# Patient Record
Sex: MALE | Race: BLACK OR AFRICAN AMERICAN | Employment: UNEMPLOYED | URBAN - METROPOLITAN AREA
[De-identification: names, ages, dates, MRNs, and addresses within clinical notes are randomized per-mention and may not be internally consistent; named-entity substitution may affect disease eponyms.]

---

## 2018-04-09 DIAGNOSIS — N18.2 CHRONIC KIDNEY DISEASE, STAGE II (MILD): ICD-10-CM

## 2018-04-09 RX ORDER — SODIUM CHLORIDE 0.9 % (FLUSH) 0.9 %
5 SYRINGE (ML) INJECTION PRN
Status: CANCELLED | OUTPATIENT
Start: 2018-04-11

## 2018-04-09 RX ORDER — EPINEPHRINE 1 MG/ML
0.3 INJECTION, SOLUTION, CONCENTRATE INTRAVENOUS PRN
Status: CANCELLED | OUTPATIENT
Start: 2018-04-11

## 2018-04-09 RX ORDER — 0.9 % SODIUM CHLORIDE 0.9 %
10 VIAL (ML) INJECTION ONCE
Status: CANCELLED | OUTPATIENT
Start: 2018-04-11 | End: 2018-04-11

## 2018-04-09 RX ORDER — SODIUM CHLORIDE 9 MG/ML
INJECTION, SOLUTION INTRAVENOUS ONCE
Status: CANCELLED | OUTPATIENT
Start: 2018-04-11 | End: 2018-04-11

## 2018-04-09 RX ORDER — SODIUM CHLORIDE 9 MG/ML
INJECTION, SOLUTION INTRAVENOUS CONTINUOUS
Status: CANCELLED | OUTPATIENT
Start: 2018-04-11

## 2018-04-09 RX ORDER — HEPARIN SODIUM (PORCINE) LOCK FLUSH IV SOLN 100 UNIT/ML 100 UNIT/ML
500 SOLUTION INTRAVENOUS PRN
Status: CANCELLED | OUTPATIENT
Start: 2018-04-11

## 2018-04-09 RX ORDER — SODIUM CHLORIDE 0.9 % (FLUSH) 0.9 %
10 SYRINGE (ML) INJECTION PRN
Status: CANCELLED | OUTPATIENT
Start: 2018-04-11

## 2018-04-09 RX ORDER — METHYLPREDNISOLONE SODIUM SUCCINATE 125 MG/2ML
125 INJECTION, POWDER, LYOPHILIZED, FOR SOLUTION INTRAMUSCULAR; INTRAVENOUS ONCE
Status: CANCELLED | OUTPATIENT
Start: 2018-04-11 | End: 2018-04-11

## 2018-04-09 RX ORDER — DIPHENHYDRAMINE HYDROCHLORIDE 50 MG/ML
50 INJECTION INTRAMUSCULAR; INTRAVENOUS ONCE
Status: CANCELLED | OUTPATIENT
Start: 2018-04-11 | End: 2018-04-11

## 2018-04-11 ENCOUNTER — HOSPITAL ENCOUNTER (OUTPATIENT)
Dept: INFUSION THERAPY | Age: 53
Setting detail: INFUSION SERIES
Discharge: HOME | End: 2018-04-11
Payer: COMMERCIAL

## 2018-04-11 VITALS
RESPIRATION RATE: 18 BRPM | HEART RATE: 98 BPM | DIASTOLIC BLOOD PRESSURE: 100 MMHG | SYSTOLIC BLOOD PRESSURE: 158 MMHG | TEMPERATURE: 100.6 F

## 2018-04-11 DIAGNOSIS — N18.2 CHRONIC KIDNEY DISEASE, STAGE II (MILD): ICD-10-CM

## 2018-04-11 PROCEDURE — 96365 THER/PROPH/DIAG IV INF INIT: CPT

## 2018-04-11 PROCEDURE — 96376 TX/PRO/DX INJ SAME DRUG ADON: CPT

## 2018-04-11 PROCEDURE — 96374 THER/PROPH/DIAG INJ IV PUSH: CPT

## 2018-04-11 PROCEDURE — 6360000002 HC RX W HCPCS: Performed by: INTERNAL MEDICINE

## 2018-04-11 PROCEDURE — 2580000003 HC RX 258: Performed by: INTERNAL MEDICINE

## 2018-04-11 RX ORDER — 0.9 % SODIUM CHLORIDE 0.9 %
10 VIAL (ML) INJECTION ONCE
Status: CANCELLED | OUTPATIENT
Start: 2018-04-11 | End: 2018-04-11

## 2018-04-11 RX ORDER — DIPHENHYDRAMINE HYDROCHLORIDE 50 MG/ML
50 INJECTION INTRAMUSCULAR; INTRAVENOUS ONCE
Status: CANCELLED | OUTPATIENT
Start: 2018-04-11 | End: 2018-04-11

## 2018-04-11 RX ORDER — SODIUM CHLORIDE 9 MG/ML
INJECTION, SOLUTION INTRAVENOUS CONTINUOUS
Status: CANCELLED | OUTPATIENT
Start: 2018-04-11

## 2018-04-11 RX ORDER — HEPARIN SODIUM (PORCINE) LOCK FLUSH IV SOLN 100 UNIT/ML 100 UNIT/ML
500 SOLUTION INTRAVENOUS PRN
Status: CANCELLED | OUTPATIENT
Start: 2018-04-11

## 2018-04-11 RX ORDER — SODIUM CHLORIDE 0.9 % (FLUSH) 0.9 %
5 SYRINGE (ML) INJECTION PRN
Status: CANCELLED | OUTPATIENT
Start: 2018-04-11

## 2018-04-11 RX ORDER — HYDRALAZINE HYDROCHLORIDE 20 MG/ML
10 INJECTION INTRAMUSCULAR; INTRAVENOUS ONCE
Status: COMPLETED | OUTPATIENT
Start: 2018-04-11 | End: 2018-04-11

## 2018-04-11 RX ORDER — MULTIVIT-MIN/IRON/FOLIC ACID/K 18-600-40
2000 CAPSULE ORAL
COMMUNITY
End: 2018-06-15

## 2018-04-11 RX ORDER — SODIUM CHLORIDE 0.9 % (FLUSH) 0.9 %
10 SYRINGE (ML) INJECTION PRN
Status: CANCELLED | OUTPATIENT
Start: 2018-04-11

## 2018-04-11 RX ORDER — HYDRALAZINE HYDROCHLORIDE 20 MG/ML
INJECTION INTRAMUSCULAR; INTRAVENOUS
Status: DISCONTINUED
Start: 2018-04-11 | End: 2018-04-12 | Stop reason: HOSPADM

## 2018-04-11 RX ORDER — SODIUM CHLORIDE 9 MG/ML
INJECTION, SOLUTION INTRAVENOUS ONCE
Status: DISCONTINUED | OUTPATIENT
Start: 2018-04-11 | End: 2018-04-12 | Stop reason: HOSPADM

## 2018-04-11 RX ORDER — AMLODIPINE BESYLATE 10 MG/1
10 TABLET ORAL EVERY MORNING
COMMUNITY

## 2018-04-11 RX ORDER — SODIUM CHLORIDE 0.9 % (FLUSH) 0.9 %
SYRINGE (ML) INJECTION
Status: DISCONTINUED
Start: 2018-04-11 | End: 2018-04-12 | Stop reason: HOSPADM

## 2018-04-11 RX ORDER — SODIUM CHLORIDE 9 MG/ML
INJECTION, SOLUTION INTRAVENOUS ONCE
Status: CANCELLED | OUTPATIENT
Start: 2018-04-11 | End: 2018-04-11

## 2018-04-11 RX ORDER — METHYLPREDNISOLONE SODIUM SUCCINATE 125 MG/2ML
125 INJECTION, POWDER, LYOPHILIZED, FOR SOLUTION INTRAMUSCULAR; INTRAVENOUS ONCE
Status: CANCELLED | OUTPATIENT
Start: 2018-04-11 | End: 2018-04-11

## 2018-04-11 RX ORDER — EPINEPHRINE 1 MG/ML
0.3 INJECTION, SOLUTION, CONCENTRATE INTRAVENOUS PRN
Status: CANCELLED | OUTPATIENT
Start: 2018-04-11

## 2018-04-11 RX ORDER — SODIUM CHLORIDE 0.9 % (FLUSH) 0.9 %
SYRINGE (ML) INJECTION
Status: DISPENSED
Start: 2018-04-11 | End: 2018-04-11

## 2018-04-11 RX ORDER — HYDRALAZINE HYDROCHLORIDE 100 MG/1
100 TABLET, FILM COATED ORAL 3 TIMES DAILY
COMMUNITY

## 2018-04-11 RX ORDER — LISINOPRIL 40 MG/1
40 TABLET ORAL EVERY MORNING
COMMUNITY
End: 2021-06-17

## 2018-04-11 RX ADMIN — FERRIC CARBOXYMALTOSE INJECTION 750 MG: 50 INJECTION, SOLUTION INTRAVENOUS at 13:30

## 2018-04-11 RX ADMIN — HYDRALAZINE HYDROCHLORIDE 10 MG: 20 INJECTION INTRAMUSCULAR; INTRAVENOUS at 12:47

## 2018-04-11 RX ADMIN — HYDRALAZINE HYDROCHLORIDE 10 MG: 20 INJECTION INTRAMUSCULAR; INTRAVENOUS at 16:26

## 2018-04-11 NOTE — PROGRESS NOTES
Admitted to infusion services for Injectofer infusion. Patient's blood pressure is elevated. No complaints of headache, blurred vision, or light headedness. Blood pressure did not improve with waiting.  Call placed to .

## 2018-04-11 NOTE — PROGRESS NOTES
Call again placed to Dr. Andrew Gregg. Awaiting orders. Patients blood pressure remains elevated.  Patient asymptomatic

## 2018-04-11 NOTE — PROGRESS NOTES
IV hydralazine given. Blood pressure responded to medication and is now 160/96. Patient states that is his usual blood pressure. IV Injectafer started.

## 2018-04-11 NOTE — PROGRESS NOTES
Blood pressure 168/100. Patient feels well. Iv site discontinued and dry dressing applied. Patient given discharge instructions and discharged in stable condition. He will return next week for his next infusion.

## 2018-04-11 NOTE — PROGRESS NOTES
Call returned from Dr. Tobin Ellis. Orders received for hydralazine IV and to restart infusion.  Patient may be discharged if blood pressure is 160/100

## 2018-04-11 NOTE — PROGRESS NOTES
Call again placed to office to have Dr. Harshil Beaver paged to call us for this patient. Blood pressure remains high and infusion on hold.

## 2018-04-11 NOTE — PROGRESS NOTES
Blood pressure elevated to 165/123. Medication stopped and blood pressure now 165/111. Patient symptomatic. Call placed to Dr. Natalie Riedel to check to proceed with treatment.

## 2018-04-11 NOTE — PROGRESS NOTES
Call again placed to Dr. Elyse Johnson office to have the doctor call us for orders due to high blood pressure.

## 2018-04-18 ENCOUNTER — HOSPITAL ENCOUNTER (OUTPATIENT)
Dept: INFUSION THERAPY | Age: 53
Setting detail: INFUSION SERIES
Discharge: HOME | End: 2018-04-18
Payer: COMMERCIAL

## 2018-04-18 VITALS
RESPIRATION RATE: 16 BRPM | SYSTOLIC BLOOD PRESSURE: 126 MMHG | TEMPERATURE: 98.2 F | HEART RATE: 76 BPM | DIASTOLIC BLOOD PRESSURE: 80 MMHG

## 2018-04-18 DIAGNOSIS — N18.2 CHRONIC KIDNEY DISEASE, STAGE II (MILD): ICD-10-CM

## 2018-04-18 PROCEDURE — 2580000003 HC RX 258: Performed by: INTERNAL MEDICINE

## 2018-04-18 PROCEDURE — 96365 THER/PROPH/DIAG IV INF INIT: CPT

## 2018-04-18 PROCEDURE — 6360000002 HC RX W HCPCS: Performed by: INTERNAL MEDICINE

## 2018-04-18 RX ORDER — SODIUM CHLORIDE 9 MG/ML
INJECTION, SOLUTION INTRAVENOUS ONCE
Status: DISCONTINUED | OUTPATIENT
Start: 2018-04-18 | End: 2018-04-19 | Stop reason: HOSPADM

## 2018-04-18 RX ORDER — 0.9 % SODIUM CHLORIDE 0.9 %
10 VIAL (ML) INJECTION ONCE
Status: CANCELLED | OUTPATIENT
Start: 2018-04-18 | End: 2018-04-18

## 2018-04-18 RX ORDER — SODIUM CHLORIDE 9 MG/ML
INJECTION, SOLUTION INTRAVENOUS CONTINUOUS
Status: CANCELLED | OUTPATIENT
Start: 2018-04-18

## 2018-04-18 RX ORDER — METHYLPREDNISOLONE SODIUM SUCCINATE 125 MG/2ML
125 INJECTION, POWDER, LYOPHILIZED, FOR SOLUTION INTRAMUSCULAR; INTRAVENOUS ONCE
Status: CANCELLED | OUTPATIENT
Start: 2018-04-18 | End: 2018-04-18

## 2018-04-18 RX ORDER — EPINEPHRINE 1 MG/ML
0.3 INJECTION, SOLUTION, CONCENTRATE INTRAVENOUS PRN
Status: CANCELLED | OUTPATIENT
Start: 2018-04-18

## 2018-04-18 RX ORDER — SODIUM CHLORIDE 0.9 % (FLUSH) 0.9 %
10 SYRINGE (ML) INJECTION PRN
Status: DISCONTINUED | OUTPATIENT
Start: 2018-04-18 | End: 2018-04-19 | Stop reason: HOSPADM

## 2018-04-18 RX ORDER — DIPHENHYDRAMINE HYDROCHLORIDE 50 MG/ML
50 INJECTION INTRAMUSCULAR; INTRAVENOUS ONCE
Status: CANCELLED | OUTPATIENT
Start: 2018-04-18 | End: 2018-04-18

## 2018-04-18 RX ORDER — SODIUM CHLORIDE 0.9 % (FLUSH) 0.9 %
5 SYRINGE (ML) INJECTION PRN
Status: CANCELLED | OUTPATIENT
Start: 2018-04-18

## 2018-04-18 RX ORDER — HEPARIN SODIUM (PORCINE) LOCK FLUSH IV SOLN 100 UNIT/ML 100 UNIT/ML
500 SOLUTION INTRAVENOUS PRN
Status: CANCELLED | OUTPATIENT
Start: 2018-04-18

## 2018-04-18 RX ORDER — SODIUM CHLORIDE 9 MG/ML
INJECTION, SOLUTION INTRAVENOUS ONCE
Status: CANCELLED | OUTPATIENT
Start: 2018-04-18 | End: 2018-04-18

## 2018-04-18 RX ORDER — SODIUM CHLORIDE 0.9 % (FLUSH) 0.9 %
10 SYRINGE (ML) INJECTION PRN
Status: CANCELLED | OUTPATIENT
Start: 2018-04-18

## 2018-04-18 RX ADMIN — Medication 750 MG: at 12:19

## 2018-04-18 RX ADMIN — Medication 10 ML: at 12:19

## 2018-04-18 NOTE — PROGRESS NOTES
Discharged to home in stable condition, tolerated infusionwell, iv dc'd intact site asymptomatic, dc papers signed and given to pt

## 2018-06-15 ENCOUNTER — HOSPITAL ENCOUNTER (EMERGENCY)
Age: 53
Discharge: HOME | End: 2018-06-15
Attending: EMERGENCY MEDICINE
Payer: COMMERCIAL

## 2018-06-15 ENCOUNTER — APPOINTMENT (OUTPATIENT)
Dept: CT IMAGING | Age: 53
End: 2018-06-15
Payer: COMMERCIAL

## 2018-06-15 VITALS
OXYGEN SATURATION: 97 % | BODY MASS INDEX: 28.25 KG/M2 | TEMPERATURE: 98 F | SYSTOLIC BLOOD PRESSURE: 145 MMHG | RESPIRATION RATE: 16 BRPM | HEART RATE: 66 BPM | HEIGHT: 67 IN | WEIGHT: 180 LBS | DIASTOLIC BLOOD PRESSURE: 95 MMHG

## 2018-06-15 DIAGNOSIS — R42 LIGHTHEADEDNESS: Primary | ICD-10-CM

## 2018-06-15 DIAGNOSIS — I10 HYPERTENSION, UNSPECIFIED TYPE: ICD-10-CM

## 2018-06-15 LAB
ALBUMIN SERPL-MCNC: 4.6 G/DL (ref 3.5–5.2)
ALP BLD-CCNC: 82 U/L (ref 40–129)
ALT SERPL-CCNC: 50 U/L (ref 0–40)
ANION GAP SERPL CALCULATED.3IONS-SCNC: 15 MMOL/L (ref 7–16)
AST SERPL-CCNC: 29 U/L (ref 0–39)
BASOPHILS ABSOLUTE: 0.03 E9/L (ref 0–0.2)
BASOPHILS RELATIVE PERCENT: 0.5 % (ref 0–2)
BILIRUB SERPL-MCNC: 0.3 MG/DL (ref 0–1.2)
BUN BLDV-MCNC: 12 MG/DL (ref 6–20)
CALCIUM SERPL-MCNC: 9.7 MG/DL (ref 8.6–10.2)
CHLORIDE BLD-SCNC: 97 MMOL/L (ref 98–107)
CO2: 25 MMOL/L (ref 22–29)
CREAT SERPL-MCNC: 1.3 MG/DL (ref 0.7–1.2)
EKG ATRIAL RATE: 82 BPM
EKG P AXIS: 34 DEGREES
EKG P-R INTERVAL: 190 MS
EKG Q-T INTERVAL: 358 MS
EKG QRS DURATION: 88 MS
EKG QTC CALCULATION (BAZETT): 418 MS
EKG R AXIS: 57 DEGREES
EKG T AXIS: 18 DEGREES
EKG VENTRICULAR RATE: 82 BPM
EOSINOPHILS ABSOLUTE: 0.05 E9/L (ref 0.05–0.5)
EOSINOPHILS RELATIVE PERCENT: 0.9 % (ref 0–6)
GFR AFRICAN AMERICAN: >60
GFR NON-AFRICAN AMERICAN: >60 ML/MIN/1.73
GLUCOSE BLD-MCNC: 121 MG/DL (ref 74–109)
HCT VFR BLD CALC: 41.6 % (ref 37–54)
HEMOGLOBIN: 13.4 G/DL (ref 12.5–16.5)
IMMATURE GRANULOCYTES #: 0.02 E9/L
IMMATURE GRANULOCYTES %: 0.4 % (ref 0–5)
LYMPHOCYTES ABSOLUTE: 2.55 E9/L (ref 1.5–4)
LYMPHOCYTES RELATIVE PERCENT: 45.2 % (ref 20–42)
MCH RBC QN AUTO: 29.5 PG (ref 26–35)
MCHC RBC AUTO-ENTMCNC: 32.2 % (ref 32–34.5)
MCV RBC AUTO: 91.6 FL (ref 80–99.9)
MONOCYTES ABSOLUTE: 0.32 E9/L (ref 0.1–0.95)
MONOCYTES RELATIVE PERCENT: 5.7 % (ref 2–12)
NEUTROPHILS ABSOLUTE: 2.67 E9/L (ref 1.8–7.3)
NEUTROPHILS RELATIVE PERCENT: 47.3 % (ref 43–80)
PDW BLD-RTO: 17.5 FL (ref 11.5–15)
PLATELET # BLD: 211 E9/L (ref 130–450)
PMV BLD AUTO: 10 FL (ref 7–12)
POTASSIUM REFLEX MAGNESIUM: 3.7 MMOL/L (ref 3.5–5)
RBC # BLD: 4.54 E12/L (ref 3.8–5.8)
SODIUM BLD-SCNC: 137 MMOL/L (ref 132–146)
TOTAL PROTEIN: 8.5 G/DL (ref 6.4–8.3)
WBC # BLD: 5.6 E9/L (ref 4.5–11.5)

## 2018-06-15 PROCEDURE — 96374 THER/PROPH/DIAG INJ IV PUSH: CPT

## 2018-06-15 PROCEDURE — 6370000000 HC RX 637 (ALT 250 FOR IP): Performed by: EMERGENCY MEDICINE

## 2018-06-15 PROCEDURE — 6360000002 HC RX W HCPCS: Performed by: EMERGENCY MEDICINE

## 2018-06-15 PROCEDURE — 93005 ELECTROCARDIOGRAM TRACING: CPT | Performed by: EMERGENCY MEDICINE

## 2018-06-15 PROCEDURE — 70450 CT HEAD/BRAIN W/O DYE: CPT

## 2018-06-15 PROCEDURE — 80053 COMPREHEN METABOLIC PANEL: CPT

## 2018-06-15 PROCEDURE — 99284 EMERGENCY DEPT VISIT MOD MDM: CPT

## 2018-06-15 PROCEDURE — 36415 COLL VENOUS BLD VENIPUNCTURE: CPT

## 2018-06-15 PROCEDURE — 85025 COMPLETE CBC W/AUTO DIFF WBC: CPT

## 2018-06-15 PROCEDURE — 2580000003 HC RX 258: Performed by: EMERGENCY MEDICINE

## 2018-06-15 PROCEDURE — 86361 T CELL ABSOLUTE COUNT: CPT

## 2018-06-15 RX ORDER — 0.9 % SODIUM CHLORIDE 0.9 %
1000 INTRAVENOUS SOLUTION INTRAVENOUS ONCE
Status: COMPLETED | OUTPATIENT
Start: 2018-06-15 | End: 2018-06-15

## 2018-06-15 RX ORDER — ONDANSETRON 4 MG/1
4 TABLET, ORALLY DISINTEGRATING ORAL EVERY 8 HOURS PRN
Qty: 10 TABLET | Refills: 0 | Status: SHIPPED | OUTPATIENT
Start: 2018-06-15 | End: 2018-11-08

## 2018-06-15 RX ORDER — MECLIZINE HCL 12.5 MG/1
25 TABLET ORAL ONCE
Status: COMPLETED | OUTPATIENT
Start: 2018-06-15 | End: 2018-06-15

## 2018-06-15 RX ORDER — ERGOCALCIFEROL (VITAMIN D2) 50 MCG
2000 CAPSULE ORAL DAILY
COMMUNITY
Start: 2018-11-08

## 2018-06-15 RX ORDER — HYDRALAZINE HYDROCHLORIDE 20 MG/ML
10 INJECTION INTRAMUSCULAR; INTRAVENOUS ONCE
Status: COMPLETED | OUTPATIENT
Start: 2018-06-15 | End: 2018-06-15

## 2018-06-15 RX ORDER — ONDANSETRON 4 MG/1
4 TABLET, ORALLY DISINTEGRATING ORAL ONCE
Status: COMPLETED | OUTPATIENT
Start: 2018-06-15 | End: 2018-06-15

## 2018-06-15 RX ADMIN — HYDRALAZINE HYDROCHLORIDE 10 MG: 20 INJECTION INTRAMUSCULAR; INTRAVENOUS at 13:26

## 2018-06-15 RX ADMIN — ONDANSETRON 4 MG: 4 TABLET, ORALLY DISINTEGRATING ORAL at 08:28

## 2018-06-15 RX ADMIN — MECLIZINE 25 MG: 12.5 TABLET ORAL at 08:28

## 2018-06-15 RX ADMIN — SODIUM CHLORIDE 1000 ML: 9 INJECTION, SOLUTION INTRAVENOUS at 09:45

## 2018-06-15 RX ADMIN — SODIUM CHLORIDE 1000 ML: 9 INJECTION, SOLUTION INTRAVENOUS at 13:24

## 2018-06-17 LAB — ABSOLUTE CD 4 HELPER: 765 CELLS/UL (ref 430–1800)

## 2018-11-08 ENCOUNTER — OFFICE VISIT (OUTPATIENT)
Dept: SURGERY | Age: 53
End: 2018-11-08
Payer: COMMERCIAL

## 2018-11-08 ENCOUNTER — TELEPHONE (OUTPATIENT)
Dept: SURGERY | Age: 53
End: 2018-11-08

## 2018-11-08 ENCOUNTER — PREP FOR PROCEDURE (OUTPATIENT)
Dept: SURGERY | Age: 53
End: 2018-11-08

## 2018-11-08 VITALS
SYSTOLIC BLOOD PRESSURE: 140 MMHG | HEIGHT: 67 IN | RESPIRATION RATE: 16 BRPM | HEART RATE: 79 BPM | WEIGHT: 183 LBS | OXYGEN SATURATION: 95 % | TEMPERATURE: 98.1 F | DIASTOLIC BLOOD PRESSURE: 92 MMHG | BODY MASS INDEX: 28.72 KG/M2

## 2018-11-08 DIAGNOSIS — Z12.11 SCREENING FOR COLORECTAL CANCER: Primary | ICD-10-CM

## 2018-11-08 DIAGNOSIS — B18.2 CHRONIC HEPATITIS C WITHOUT HEPATIC COMA (HCC): ICD-10-CM

## 2018-11-08 DIAGNOSIS — K42.9 UMBILICAL HERNIA WITHOUT OBSTRUCTION AND WITHOUT GANGRENE: ICD-10-CM

## 2018-11-08 DIAGNOSIS — R01.1 HEART MURMUR, SYSTOLIC: ICD-10-CM

## 2018-11-08 DIAGNOSIS — B20 HUMAN IMMUNODEFICIENCY VIRUS (HIV) DISEASE (HCC): ICD-10-CM

## 2018-11-08 DIAGNOSIS — Z12.12 SCREENING FOR COLORECTAL CANCER: Primary | ICD-10-CM

## 2018-11-08 PROCEDURE — 99202 OFFICE O/P NEW SF 15 MIN: CPT | Performed by: SURGERY

## 2018-11-08 PROCEDURE — G8484 FLU IMMUNIZE NO ADMIN: HCPCS | Performed by: SURGERY

## 2018-11-08 PROCEDURE — 3017F COLORECTAL CA SCREEN DOC REV: CPT | Performed by: SURGERY

## 2018-11-08 PROCEDURE — 1036F TOBACCO NON-USER: CPT | Performed by: SURGERY

## 2018-11-08 PROCEDURE — G8419 CALC BMI OUT NRM PARAM NOF/U: HCPCS | Performed by: SURGERY

## 2018-11-08 PROCEDURE — 99204 OFFICE O/P NEW MOD 45 MIN: CPT | Performed by: SURGERY

## 2018-11-08 PROCEDURE — G8427 DOCREV CUR MEDS BY ELIG CLIN: HCPCS | Performed by: SURGERY

## 2018-11-08 RX ORDER — 0.9 % SODIUM CHLORIDE 0.9 %
10 VIAL (ML) INJECTION EVERY 12 HOURS SCHEDULED
Status: CANCELLED | OUTPATIENT
Start: 2018-11-08

## 2018-11-08 RX ORDER — SODIUM CHLORIDE, SODIUM LACTATE, POTASSIUM CHLORIDE, CALCIUM CHLORIDE 600; 310; 30; 20 MG/100ML; MG/100ML; MG/100ML; MG/100ML
INJECTION, SOLUTION INTRAVENOUS CONTINUOUS
Status: CANCELLED | OUTPATIENT
Start: 2018-11-08

## 2018-11-08 RX ORDER — 0.9 % SODIUM CHLORIDE 0.9 %
10 VIAL (ML) INJECTION PRN
Status: CANCELLED | OUTPATIENT
Start: 2018-11-08

## 2018-11-08 ASSESSMENT — ENCOUNTER SYMPTOMS
SINUS PAIN: 0
CONSTIPATION: 0
COUGH: 0
BLOOD IN STOOL: 0
RECTAL PAIN: 0
ABDOMINAL DISTENTION: 0
EYE REDNESS: 0
EYE PAIN: 0
BACK PAIN: 0
ANAL BLEEDING: 0
SORE THROAT: 0
DIARRHEA: 0
ABDOMINAL PAIN: 0
WHEEZING: 0
VOMITING: 0
NAUSEA: 0
SHORTNESS OF BREATH: 0
EYE DISCHARGE: 0

## 2018-11-08 NOTE — PROGRESS NOTES
Scheduled pt for Screening Colonoscopy on 12/11/18 at 8:00 AM. Pt needs to arrive at [address] [place] [address] at 7:00 AM. Confirmed in office. Sent instruction sheet.   Electronically signed by Will Callas on 11/8/18 at 2:54 PM

## 2018-11-08 NOTE — TELEPHONE ENCOUNTER
MA received a call from Alex Cummings at Dr. Anderson Karimi office to schedule pt for a screening colonoscopy consult. Pt is required to have a screening colonoscopy as he was diagnosed with HIV. MA Scheduled pt for 11/8/18 at 1:30 pm with Dr. Bria Escobedo, [place] mailing appointment letter to pt. MA awaiting faxed referral from Dr. Mando Hughes and will scan into chart.   Electronically signed by Ebony Juárez on 9/28/18 at 11:17 AM

## 2018-11-08 NOTE — PATIENT INSTRUCTIONS
the lining. A tissue sample or polyps may be removed during the procedure. How Long Will It Take? Usually it takes about 30 to 45 minutes     Will It Hurt? Most people do not feel anything during the procedure and will not remember the procedure. After the procedure, gas pains and cramping are common. These pains should go away with the passing of gas. Post-procedure Care   If any tissue was removed: It will be sent to a lab to be examined. It may take 1-2 weeks for results. The doctor will usually give an initial report after the scope is removed. Other tests may be recommended. A small amount of bleeding may occur during the first few days after the procedure. When you return home after the procedure, be sure to follow your doctor's instructions, which may include:   Resume medicines as instructed by your doctor. Resume normal diet, unless directed otherwise by your doctor. The sedative will make you drowsy. Avoid driving, operating machinery, or making important decisions for the rest of the day. Rest for the remainder of the day. After arriving home, contact your doctor if any of the following occurs:   Bleeding from your rectum, notify your doctor if you pass a teaspoonful of blood or more. Black, tarry stools   Severe abdominal pain   Hard, swollen abdomen   Signs of infection, including fever or chills   Inability to pass gas or stool   Coughing, shortness of breath, chest pain, severe nausea or vomiting     In case of an emergency, CALL 911 .

## 2018-11-08 NOTE — PROGRESS NOTES
History and Physical    Patient's Name/Date of Birth: Sharron Browne /1965, (52 y.o.), male      Date: 2018     Chief Complaint:  Chief Complaint   Patient presents with   [place] [facility] Consultation     pt is here for screening colonoscopy consult, pt denies any family history of colon cancer, pt has never had a colonoscopy before. pt denies any symptoms such as changes in bowel habits, unintentional weight loss, abdominal pain or rectal bleeding. HPI:   Patient was seen in the office today for the above complaints. He has never had a colonoscopy. He has chronic irregularity of his bowel movements all his life but no changes recently. He denied any blood in his stool. He denied any abdominal pain or bloating. He denied any nausea, vomiting, heartburn, or indigestion. Family history was negative for colon cancer or polyps.     Past Medical History:   Diagnosis Date    Hepatitis C     Had treatment     HIV (human immunodeficiency virus infection) ([facility] [address]     Hypertension          Past Surgical History:   Procedure Laterality Date    LIVER BIOPSY  2017    Chronic Hepatitis C,  [place]    for pyloric stenosis as        Current Outpatient Prescriptions   Medication Sig Dispense Refill    bisacodyl (DULCOLAX) 5 MG EC tablet Take 4 tablets orally twice the day before colonoscopy as directed 8 tablet 0    magnesium citrate solution Take 300 mLs by mouth 3 times daily for 3 doses Take one 10 ounce bottle three times the day before colonoscopy as directed 900 mL 0    Jtlkzuc-Haglv-Nctecydx-TenofAF (GENVOYA) [phone] MG TABS Take 1 tablet by mouth every evening      VITAMIN D, ERGOCALCIFEROL, PO Take 2,000 Units by mouth daily daily      lisinopril (PRINIVIL;ZESTRIL) 40 MG tablet Take 40 mg by mouth every morning       amLODIPine (NORVASC) 10 MG tablet Take 10 mg by mouth every morning       hydrALAZINE (APRESOLINE) 25 MG tablet Take 50 mg by mouth 3 BP: (!) 140/92   Site: Left Upper Arm   Position: Sitting   Cuff Size: Large Adult   Pulse: 79   Resp: 16   Temp: 98.1 °F (36.7 °C)   TempSrc: Oral   SpO2: 95%   Weight: 183 lb (83 kg)   Height: 5' 7\" (1.702 m)       Body mass index is 28.66 kg/m². Physical Exam   Constitutional: He is oriented to person, place, and time. He appears well-developed and well-nourished. No distress. HENT:   Head: Normocephalic and atraumatic. Eyes: Right eye exhibits no discharge. Left eye exhibits no discharge. Neck: Normal range of motion. Cardiovascular: Normal rate and regular rhythm. Exam reveals no gallop and no friction rub. Murmur (2/6 systolic ejection murmur hear best in upper left sternal border) heard. Pulmonary/Chest: Effort normal and breath sounds normal. No respiratory distress. He has no wheezes. He has no rales. He exhibits no tenderness. Abdominal: Soft. Bowel sounds are normal. He exhibits no distension and no mass. There is no hepatosplenomegaly. There is no tenderness. There is no rigidity, no rebound and no guarding. A hernia (questionable, small, reducible, asymptomatic umbilical hernia noted) is present. Hernia confirmed negative in the ventral area, confirmed negative in the right inguinal area and confirmed negative in the left inguinal area. Longitudinal surgical scar in the right of the epigastrium from pyloromyotomy as a    Genitourinary: Penis normal. Rectal exam shows no external hemorrhoid, no internal hemorrhoid, no fissure, no mass, no tenderness, anal tone normal and guaiac negative stool. Prostate is not enlarged and not tender. Right testis shows no mass, no swelling and no tenderness. Left testis shows no mass, no swelling and no tenderness. Musculoskeletal: Normal range of motion. He exhibits no edema or deformity. Neurological: He is alert and oriented to person, place, and time. Skin: Skin is warm and dry. No rash noted. He is not diaphoretic. No erythema.  No pallor. Psychiatric: He has a normal mood and affect. His behavior is normal. Judgment normal.     Assessment/Plan:  1. Colorectal Cancer Screening - I recommended low risk screening colonoscopy with possible biopsy or polypectomy and explained the risk, benefits, expected outcome, and alternatives to the procedure. Risks included but are not limited to bleeding, infection, respiratory distress, hypotension, and perforation of the colon. The patient understands and is in agreement. 2. Questionable Small Asymptomatic Umbilical Hernia - informed patient of this and recommended observation. 3. Systolic Heart Murmur - patient states that this was first noticed by [facility] but they did not recommend any work up. 4. HIV Positive - Dr. Siomara Comer follows for this  5. Chronic Hepatitis C - in remission now, followed by PCP   6. Chronic Kidney Disease - followed by Dr. Lolis Gomez  7.  Essential Hypertension    Electronically signed by Kay Singletary MD on 11/8/18 at 2:22 PM

## 2018-11-08 NOTE — H&P
pallor. Psychiatric: He has a normal mood and affect. His behavior is normal. Judgment normal.     Assessment/Plan:  1. Colorectal Cancer Screening - I recommended low risk screening colonoscopy with possible biopsy or polypectomy and explained the risk, benefits, expected outcome, and alternatives to the procedure. Risks included but are not limited to bleeding, infection, respiratory distress, hypotension, and perforation of the colon. The patient understands and is in agreement. 2. Questionable Small Asymptomatic Umbilical Hernia - informed patient of this and recommended observation. 3. Systolic Heart Murmur - patient states that this was first noticed by [facility] but they did not recommend any work up. 4. HIV Positive - Dr. Yanique Liu follows for this  5. Chronic Hepatitis C - in remission now, followed by PCP   6. Chronic Kidney Disease - followed by Dr. Ashanti King  7.  Essential Hypertension    Electronically signed by Esperanza Jenkins MD on 11/8/18 at 2:22 PM

## 2018-12-07 NOTE — PROGRESS NOTES
remainder of the day due to having had anesthesia. PARKING INSTRUCTIONS:   · [x] Arrival Time 0700  · [x] Parking lot 1 is located on [facility] (the corner of [facility] and [facility]). To enter, press the button and the gate will lift. A free token will be provided to exit the lot. One car per patient is allowed to park in this lot. All other cars are to park on [address] [place] [address] Street either in the parking garage or the handicap lot. [] Free  parking is available on [address] [place] [address] Street. · [] To reach the [facility] lobby from [address] [place] [address], upon entering the hospital, take elevator B to the 3rd floor. EDUCATION INSTRUCTIONS:      [] Knee or hip replacement booklet & exercise pamphlets given. [] Mohit Teran placed in chart. [] Pre-admission Testing educational folder given  [] Incentive Spirometry,coughing & deep breathing exercises reviewed. []Medication information sheet(s)   []Fluoroscopy-Xray used in surgery reviewed with patient. Educational pamphlet placed in chart. []Pain: Post-op pain is normal and to be expected. You will be asked to rate your pain from 0-10(a zero is not acceptable-education is needed). Your post-op pain goal is:  [] Ask your nurse for your pain medication. [] Joint camp offered. [] Joint replacement booklets given. []Other     MEDICATION INSTRUCTIONS:   [x]Bring a complete list of your medications, please write the last time you took the medicine, give this list to the nurse. [x] Take the following medications the morning of surgery with 1-2 ounces of water: SEE MED LIST  [] Stop herbal supplements and vitamins 5 days before your surgery. [] DO NOT take any diabetic medicine the morning of surgery. Follow instructions for insulin the day before surgery. [] If you are diabetic and your blood sugar is low or you feel symptomatic, you may drink 1-2 ounces of apple juice or take a glucose tablet.   The morning of your

## 2018-12-11 ENCOUNTER — HOSPITAL ENCOUNTER (OUTPATIENT)
Age: 53
Setting detail: OUTPATIENT SURGERY
Discharge: HOME | End: 2018-12-11
Attending: SURGERY | Admitting: SURGERY
Payer: COMMERCIAL

## 2018-12-11 ENCOUNTER — ANESTHESIA (OUTPATIENT)
Dept: ENDOSCOPY | Age: 53
End: 2018-12-11
Payer: COMMERCIAL

## 2018-12-11 ENCOUNTER — ANESTHESIA EVENT (OUTPATIENT)
Dept: ENDOSCOPY | Age: 53
End: 2018-12-11
Payer: COMMERCIAL

## 2018-12-11 VITALS
TEMPERATURE: 97.2 F | DIASTOLIC BLOOD PRESSURE: 74 MMHG | HEART RATE: 76 BPM | HEIGHT: 67 IN | WEIGHT: 182 LBS | SYSTOLIC BLOOD PRESSURE: 126 MMHG | RESPIRATION RATE: 17 BRPM | BODY MASS INDEX: 28.56 KG/M2 | OXYGEN SATURATION: 99 %

## 2018-12-11 VITALS
RESPIRATION RATE: 27 BRPM | DIASTOLIC BLOOD PRESSURE: 66 MMHG | SYSTOLIC BLOOD PRESSURE: 120 MMHG | OXYGEN SATURATION: 99 %

## 2018-12-11 DIAGNOSIS — Z12.12 SCREENING FOR COLORECTAL CANCER: ICD-10-CM

## 2018-12-11 DIAGNOSIS — Z12.11 SCREENING FOR COLORECTAL CANCER: ICD-10-CM

## 2018-12-11 PROBLEM — K62.1 RECTAL POLYP: Status: ACTIVE | Noted: 2018-12-11

## 2018-12-11 PROBLEM — K63.5 POLYP OF ASCENDING COLON: Status: ACTIVE | Noted: 2018-12-11

## 2018-12-11 PROCEDURE — 3700000000 HC ANESTHESIA ATTENDED CARE: Performed by: SURGERY

## 2018-12-11 PROCEDURE — C1773 RET DEV, INSERTABLE: HCPCS | Performed by: SURGERY

## 2018-12-11 PROCEDURE — 2580000003 HC RX 258: Performed by: NURSE ANESTHETIST, CERTIFIED REGISTERED

## 2018-12-11 PROCEDURE — 3609010600 HC COLONOSCOPY POLYPECTOMY SNARE/COLD BIOPSY: Performed by: SURGERY

## 2018-12-11 PROCEDURE — 3700000001 HC ADD 15 MINUTES (ANESTHESIA): Performed by: SURGERY

## 2018-12-11 PROCEDURE — 7100000010 HC PHASE II RECOVERY - FIRST 15 MIN: Performed by: SURGERY

## 2018-12-11 PROCEDURE — 2709999900 HC NON-CHARGEABLE SUPPLY: Performed by: SURGERY

## 2018-12-11 PROCEDURE — 7100000011 HC PHASE II RECOVERY - ADDTL 15 MIN: Performed by: SURGERY

## 2018-12-11 PROCEDURE — 45384 COLONOSCOPY W/LESION REMOVAL: CPT | Performed by: SURGERY

## 2018-12-11 PROCEDURE — 88305 TISSUE EXAM BY PATHOLOGIST: CPT

## 2018-12-11 PROCEDURE — 2580000003 HC RX 258: Performed by: SURGERY

## 2018-12-11 PROCEDURE — 6360000002 HC RX W HCPCS: Performed by: NURSE ANESTHETIST, CERTIFIED REGISTERED

## 2018-12-11 PROCEDURE — 45385 COLONOSCOPY W/LESION REMOVAL: CPT | Performed by: SURGERY

## 2018-12-11 RX ORDER — PROPOFOL 10 MG/ML
INJECTION, EMULSION INTRAVENOUS PRN
Status: DISCONTINUED | OUTPATIENT
Start: 2018-12-11 | End: 2018-12-11 | Stop reason: SDUPTHER

## 2018-12-11 RX ORDER — 0.9 % SODIUM CHLORIDE 0.9 %
10 VIAL (ML) INJECTION PRN
Status: DISCONTINUED | OUTPATIENT
Start: 2018-12-11 | End: 2018-12-11 | Stop reason: HOSPADM

## 2018-12-11 RX ORDER — SODIUM CHLORIDE 9 MG/ML
INJECTION, SOLUTION INTRAVENOUS CONTINUOUS PRN
Status: DISCONTINUED | OUTPATIENT
Start: 2018-12-11 | End: 2018-12-11 | Stop reason: SDUPTHER

## 2018-12-11 RX ORDER — 0.9 % SODIUM CHLORIDE 0.9 %
10 VIAL (ML) INJECTION EVERY 12 HOURS SCHEDULED
Status: DISCONTINUED | OUTPATIENT
Start: 2018-12-11 | End: 2018-12-11 | Stop reason: HOSPADM

## 2018-12-11 RX ORDER — SODIUM CHLORIDE, SODIUM LACTATE, POTASSIUM CHLORIDE, CALCIUM CHLORIDE 600; 310; 30; 20 MG/100ML; MG/100ML; MG/100ML; MG/100ML
INJECTION, SOLUTION INTRAVENOUS CONTINUOUS
Status: DISCONTINUED | OUTPATIENT
Start: 2018-12-11 | End: 2018-12-11 | Stop reason: HOSPADM

## 2018-12-11 RX ADMIN — PROPOFOL 50 MG: 10 INJECTION, EMULSION INTRAVENOUS at 08:10

## 2018-12-11 RX ADMIN — PROPOFOL 50 MG: 10 INJECTION, EMULSION INTRAVENOUS at 08:25

## 2018-12-11 RX ADMIN — PROPOFOL 120 MG: 10 INJECTION, EMULSION INTRAVENOUS at 07:55

## 2018-12-11 RX ADMIN — PROPOFOL 50 MG: 10 INJECTION, EMULSION INTRAVENOUS at 08:20

## 2018-12-11 RX ADMIN — SODIUM CHLORIDE, POTASSIUM CHLORIDE, SODIUM LACTATE AND CALCIUM CHLORIDE: 600; 310; 30; 20 INJECTION, SOLUTION INTRAVENOUS at 07:41

## 2018-12-11 RX ADMIN — PROPOFOL 80 MG: 10 INJECTION, EMULSION INTRAVENOUS at 08:00

## 2018-12-11 RX ADMIN — SODIUM CHLORIDE: 9 INJECTION, SOLUTION INTRAVENOUS at 07:38

## 2018-12-11 RX ADMIN — PROPOFOL 50 MG: 10 INJECTION, EMULSION INTRAVENOUS at 08:30

## 2018-12-11 RX ADMIN — PROPOFOL 20 MG: 10 INJECTION, EMULSION INTRAVENOUS at 08:35

## 2018-12-11 RX ADMIN — PROPOFOL 50 MG: 10 INJECTION, EMULSION INTRAVENOUS at 08:05

## 2018-12-11 RX ADMIN — PROPOFOL 50 MG: 10 INJECTION, EMULSION INTRAVENOUS at 08:15

## 2018-12-11 ASSESSMENT — PAIN - FUNCTIONAL ASSESSMENT: PAIN_FUNCTIONAL_ASSESSMENT: 0-10

## 2018-12-11 ASSESSMENT — PAIN SCALES - GENERAL
PAINLEVEL_OUTOF10: 0

## 2018-12-11 NOTE — ANESTHESIA PRE PROCEDURE
Continuous PRN Lokesh Resendiz APRN - CRNA           Allergies:     Allergies   Allergen Reactions    Penicillins Rash       Problem List:    Patient Active Problem List   Diagnosis Code    Chronic kidney disease, stage II (mild) N18.2    Hypertension I10    Human immunodeficiency virus (HIV) disease ([facility] [address] B20    Chronic hepatitis C without hepatic coma ([facility] [address] T25.3    Umbilical hernia without obstruction and without gangrene K42.9    Heart murmur, systolic Y17.2       Past Medical History:        Diagnosis Date    Hepatitis C     Had treatment     HIV (human immunodeficiency virus infection) ([facility] [address]     Hypertension        Past Surgical History:        Procedure Laterality Date    LIVER BIOPSY  2017    Chronic Hepatitis C,  [place]    for pyloric stenosis as        Social History:    Social History   Substance Use Topics    Smoking status: Former Smoker     Packs/day: 0.10     Types: Cigarettes     Start date: 6/15/1979     Quit date: 6/15/1995    Smokeless tobacco: Never Used      Comment: social smoker    Alcohol use Yes                                Counseling given: Not Answered      Vital Signs (Current):   Vitals:    18 1526 18 0730   BP:  136/87   Pulse:  96   Resp:  20   Temp:  36.8 °C (98.2 °F)   TempSrc:  Temporal   SpO2:  99%   Weight: 182 lb (82.6 kg) 182 lb (82.6 kg)   Height: 5' 7\" (1.702 m) 5' 7\" (1.702 m)                                              BP Readings from Last 3 Encounters:   18 136/87   18 (!) 140/92   06/15/18 (!) 145/95       NPO Status: Time of last liquid consumption: 0400 (bowel prep)                        Time of last solid consumption: 1900                        Date of last liquid consumption: 12/10/18                        Date of last solid food consumption: 18    BMI:   Wt Readings from Last 3 Encounters:   18 182 lb (82.6 kg)   18 183 lb (83 kg)   06/15/18 180 lb (81.6 kg)

## 2018-12-13 ENCOUNTER — CLINICAL DOCUMENTATION (OUTPATIENT)
Dept: SURGERY | Age: 53
End: 2018-12-13

## 2018-12-13 ENCOUNTER — PATIENT MESSAGE (OUTPATIENT)
Dept: SURGERY | Age: 53
End: 2018-12-13

## 2018-12-13 PROBLEM — I10 HYPERTENSION: Chronic | Status: ACTIVE | Noted: 2018-06-15

## 2018-12-13 PROBLEM — Z86.0100 HISTORY OF COLON POLYPS: Chronic | Status: ACTIVE | Noted: 2018-12-11

## 2018-12-13 PROBLEM — Z86.010 HISTORY OF COLON POLYPS: Chronic | Status: ACTIVE | Noted: 2018-12-11

## 2018-12-13 PROBLEM — N18.2 CHRONIC KIDNEY DISEASE, STAGE II (MILD): Chronic | Status: ACTIVE | Noted: 2018-04-09

## 2018-12-13 PROBLEM — K63.5 POLYP OF ASCENDING COLON: Status: RESOLVED | Noted: 2018-12-11 | Resolved: 2018-12-13

## 2018-12-13 PROBLEM — R01.1 HEART MURMUR, SYSTOLIC: Chronic | Status: ACTIVE | Noted: 2018-11-08

## 2018-12-13 PROBLEM — K42.9 UMBILICAL HERNIA WITHOUT OBSTRUCTION AND WITHOUT GANGRENE: Chronic | Status: ACTIVE | Noted: 2018-11-08

## 2018-12-14 ENCOUNTER — TELEPHONE (OUTPATIENT)
Dept: SURGERY | Age: 53
End: 2018-12-14

## 2018-12-14 NOTE — TELEPHONE ENCOUNTER
MA left a message with pt to return call to update pt's PCP as the PCP listed currently (Dr. Shivam Chavez) is a nephrologist, MA required correct PCP to send colonoscopy and path reports. MA awaiting return call.   Electronically signed by Jose F Escobedo on 12/14/18 at 10:25 AM

## 2019-11-01 ENCOUNTER — TELEPHONE (OUTPATIENT)
Dept: SURGERY | Age: 54
End: 2019-11-01

## 2019-11-07 ENCOUNTER — TELEPHONE (OUTPATIENT)
Dept: SURGERY | Age: 54
End: 2019-11-07

## 2020-03-03 ENCOUNTER — APPOINTMENT (OUTPATIENT)
Dept: GENERAL RADIOLOGY | Age: 55
End: 2020-03-03
Payer: COMMERCIAL

## 2020-03-03 ENCOUNTER — HOSPITAL ENCOUNTER (EMERGENCY)
Age: 55
Discharge: HOME | End: 2020-03-03
Attending: EMERGENCY MEDICINE
Payer: COMMERCIAL

## 2020-03-03 ENCOUNTER — APPOINTMENT (OUTPATIENT)
Dept: CT IMAGING | Age: 55
End: 2020-03-03
Payer: COMMERCIAL

## 2020-03-03 VITALS
BODY MASS INDEX: 31.39 KG/M2 | OXYGEN SATURATION: 97 % | HEART RATE: 74 BPM | TEMPERATURE: 98.2 F | DIASTOLIC BLOOD PRESSURE: 98 MMHG | WEIGHT: 200 LBS | SYSTOLIC BLOOD PRESSURE: 179 MMHG | HEIGHT: 67 IN | RESPIRATION RATE: 18 BRPM

## 2020-03-03 LAB
ANION GAP SERPL CALCULATED.3IONS-SCNC: 13 MMOL/L (ref 7–16)
BASOPHILS ABSOLUTE: 0.02 E9/L (ref 0–0.2)
BASOPHILS RELATIVE PERCENT: 0.4 % (ref 0–2)
BUN BLDV-MCNC: 13 MG/DL (ref 6–20)
CALCIUM SERPL-MCNC: 9.7 MG/DL (ref 8.6–10.2)
CHLORIDE BLD-SCNC: 105 MMOL/L (ref 98–107)
CO2: 22 MMOL/L (ref 22–29)
CREAT SERPL-MCNC: 1.5 MG/DL (ref 0.7–1.2)
EKG ATRIAL RATE: 87 BPM
EKG P AXIS: 32 DEGREES
EKG P-R INTERVAL: 190 MS
EKG Q-T INTERVAL: 364 MS
EKG QRS DURATION: 92 MS
EKG QTC CALCULATION (BAZETT): 438 MS
EKG R AXIS: 48 DEGREES
EKG T AXIS: -5 DEGREES
EKG VENTRICULAR RATE: 87 BPM
EOSINOPHILS ABSOLUTE: 0.05 E9/L (ref 0.05–0.5)
EOSINOPHILS RELATIVE PERCENT: 1.1 % (ref 0–6)
GFR AFRICAN AMERICAN: 58
GFR NON-AFRICAN AMERICAN: 58 ML/MIN/1.73
GLUCOSE BLD-MCNC: 125 MG/DL (ref 74–99)
HCT VFR BLD CALC: 39.8 % (ref 37–54)
HEMOGLOBIN: 13.1 G/DL (ref 12.5–16.5)
IMMATURE GRANULOCYTES #: 0.02 E9/L
IMMATURE GRANULOCYTES %: 0.4 % (ref 0–5)
LYMPHOCYTES ABSOLUTE: 2.11 E9/L (ref 1.5–4)
LYMPHOCYTES RELATIVE PERCENT: 45.1 % (ref 20–42)
MCH RBC QN AUTO: 31.7 PG (ref 26–35)
MCHC RBC AUTO-ENTMCNC: 32.9 % (ref 32–34.5)
MCV RBC AUTO: 96.4 FL (ref 80–99.9)
MONOCYTES ABSOLUTE: 0.42 E9/L (ref 0.1–0.95)
MONOCYTES RELATIVE PERCENT: 9 % (ref 2–12)
NEUTROPHILS ABSOLUTE: 2.06 E9/L (ref 1.8–7.3)
NEUTROPHILS RELATIVE PERCENT: 44 % (ref 43–80)
PDW BLD-RTO: 12.6 FL (ref 11.5–15)
PLATELET # BLD: 252 E9/L (ref 130–450)
PMV BLD AUTO: 9.8 FL (ref 7–12)
POTASSIUM REFLEX MAGNESIUM: 3.8 MMOL/L (ref 3.5–5)
PRO-BNP: 21 PG/ML (ref 0–125)
RBC # BLD: 4.13 E12/L (ref 3.8–5.8)
SODIUM BLD-SCNC: 140 MMOL/L (ref 132–146)
TROPONIN: <0.01 NG/ML (ref 0–0.03)
WBC # BLD: 4.7 E9/L (ref 4.5–11.5)

## 2020-03-03 PROCEDURE — 85025 COMPLETE CBC W/AUTO DIFF WBC: CPT

## 2020-03-03 PROCEDURE — 71045 X-RAY EXAM CHEST 1 VIEW: CPT

## 2020-03-03 PROCEDURE — 80048 BASIC METABOLIC PNL TOTAL CA: CPT

## 2020-03-03 PROCEDURE — 2580000003 HC RX 258: Performed by: EMERGENCY MEDICINE

## 2020-03-03 PROCEDURE — 70450 CT HEAD/BRAIN W/O DYE: CPT

## 2020-03-03 PROCEDURE — 83880 ASSAY OF NATRIURETIC PEPTIDE: CPT

## 2020-03-03 PROCEDURE — 93005 ELECTROCARDIOGRAM TRACING: CPT | Performed by: EMERGENCY MEDICINE

## 2020-03-03 PROCEDURE — 99284 EMERGENCY DEPT VISIT MOD MDM: CPT

## 2020-03-03 PROCEDURE — 84484 ASSAY OF TROPONIN QUANT: CPT

## 2020-03-03 PROCEDURE — 6370000000 HC RX 637 (ALT 250 FOR IP): Performed by: EMERGENCY MEDICINE

## 2020-03-03 RX ORDER — METOCLOPRAMIDE HYDROCHLORIDE 5 MG/ML
10 INJECTION INTRAMUSCULAR; INTRAVENOUS ONCE
Status: DISCONTINUED | OUTPATIENT
Start: 2020-03-03 | End: 2020-03-03 | Stop reason: HOSPADM

## 2020-03-03 RX ORDER — LABETALOL HYDROCHLORIDE 5 MG/ML
10 INJECTION, SOLUTION INTRAVENOUS ONCE
Status: DISCONTINUED | OUTPATIENT
Start: 2020-03-03 | End: 2020-03-03

## 2020-03-03 RX ORDER — DIPHENHYDRAMINE HYDROCHLORIDE 50 MG/ML
25 INJECTION INTRAMUSCULAR; INTRAVENOUS ONCE
Status: DISCONTINUED | OUTPATIENT
Start: 2020-03-03 | End: 2020-03-03 | Stop reason: HOSPADM

## 2020-03-03 RX ORDER — 0.9 % SODIUM CHLORIDE 0.9 %
500 INTRAVENOUS SOLUTION INTRAVENOUS ONCE
Status: COMPLETED | OUTPATIENT
Start: 2020-03-03 | End: 2020-03-03

## 2020-03-03 RX ORDER — ACETAMINOPHEN 325 MG/1
650 TABLET ORAL ONCE
Status: COMPLETED | OUTPATIENT
Start: 2020-03-03 | End: 2020-03-03

## 2020-03-03 RX ADMIN — ACETAMINOPHEN 650 MG: 325 TABLET ORAL at 08:07

## 2020-03-03 RX ADMIN — SODIUM CHLORIDE 500 ML: 9 INJECTION, SOLUTION INTRAVENOUS at 08:09

## 2020-03-03 ASSESSMENT — ENCOUNTER SYMPTOMS
RHINORRHEA: 0
DIARRHEA: 0
BLOOD IN STOOL: 0
EYE PAIN: 0
EYE DISCHARGE: 0
SINUS PRESSURE: 0
NAUSEA: 0
VISUAL CHANGE: 0
COUGH: 0
ABDOMINAL PAIN: 0
VOMITING: 0
SORE THROAT: 0
WHEEZING: 0
BACK PAIN: 0
SHORTNESS OF BREATH: 0
EYE REDNESS: 0

## 2020-03-03 ASSESSMENT — VISUAL ACUITY: OU: 1

## 2020-03-03 ASSESSMENT — PAIN DESCRIPTION - LOCATION: LOCATION: HEAD

## 2020-03-03 ASSESSMENT — PAIN SCALES - GENERAL
PAINLEVEL_OUTOF10: 8
PAINLEVEL_OUTOF10: 3

## 2020-03-03 ASSESSMENT — PAIN DESCRIPTION - PAIN TYPE: TYPE: ACUTE PAIN

## 2020-03-03 ASSESSMENT — PAIN DESCRIPTION - DESCRIPTORS: DESCRIPTORS: ACHING

## 2020-03-03 NOTE — ED PROVIDER NOTES
Patient is a 62years old male with history of HTN for which he takes amlodipine, hydralazine, and lisinopril here with complaint of feeling lightheaded since 5:20 AM this morning. Patient states he took his blood pressure medication after he checked his blood pressure which was 181/111. He states nothing makes his symptoms worse or better. He denies chest pain/pressure, shortness of breath, LOC, falls, injuries, headache, neck pain/stiffness, change in vision/hearing, numbness/tingling, focal weakness, nausea/vomiting, abdominal pain, diarrhea, constipation, blood in stool or urine, burning with urination, urinary frequent changes, use of recreational drugs/alcohol. Patient at later point while in the ED also complained of dull generalized headache. Dizziness   Quality:  Lightheadedness  Relieved by:  Nothing  Worsened by:  Nothing  Associated symptoms: headaches    Associated symptoms: no blood in stool, no chest pain, no diarrhea, no hearing loss, no nausea, no palpitations, no shortness of breath, no syncope, no vision changes, no vomiting and no weakness    Risk factors: no hx of vertigo         Review of Systems   Constitutional: Negative for chills, diaphoresis and fever. HENT: Negative for ear pain, hearing loss, rhinorrhea, sinus pressure and sore throat. Eyes: Negative for pain, discharge, redness and visual disturbance. Respiratory: Negative for cough, shortness of breath and wheezing. Cardiovascular: Negative for chest pain, palpitations and syncope. Gastrointestinal: Negative for abdominal pain, blood in stool, diarrhea, nausea and vomiting. Genitourinary: Negative for dysuria, flank pain, frequency and hematuria. Musculoskeletal: Negative for arthralgias, back pain, neck pain and neck stiffness. Skin: Negative for rash and wound. Neurological: Positive for dizziness, light-headedness and headaches.  Negative for seizures, syncope, facial asymmetry, speech difficulty, weakness and numbness. Hematological: Negative for adenopathy. All other systems reviewed and are negative. Physical Exam  Vitals signs and nursing note reviewed. Constitutional:       Appearance: He is well-developed. HENT:      Head: Normocephalic and atraumatic. No raccoon eyes or Jung's sign. Nose: Nose normal.      Right Sinus: No maxillary sinus tenderness or frontal sinus tenderness. Left Sinus: No maxillary sinus tenderness or frontal sinus tenderness. Mouth/Throat:      Mouth: Mucous membranes are moist.      Pharynx: Oropharynx is clear. Uvula midline. Eyes:      General: Lids are normal. Vision grossly intact. Extraocular Movements: Extraocular movements intact. Conjunctiva/sclera: Conjunctivae normal.      Pupils: Pupils are equal, round, and reactive to light. Neck:      Musculoskeletal: Normal range of motion and neck supple. Vascular: No JVD. Trachea: Trachea normal.   Cardiovascular:      Rate and Rhythm: Normal rate and regular rhythm. Pulses:           Radial pulses are 2+ on the right side and 2+ on the left side. Dorsalis pedis pulses are 2+ on the right side and 2+ on the left side. Posterior tibial pulses are 2+ on the right side and 2+ on the left side. Heart sounds: Normal heart sounds. Pulmonary:      Effort: Pulmonary effort is normal. No respiratory distress. Breath sounds: Normal breath sounds. No wheezing or rales. Abdominal:      General: Bowel sounds are normal.      Palpations: Abdomen is soft. Tenderness: There is no abdominal tenderness. There is no right CVA tenderness, left CVA tenderness, guarding or rebound. Musculoskeletal:      Right lower leg: He exhibits no tenderness and no swelling. Left lower leg: He exhibits no tenderness and no swelling. Skin:     General: Skin is warm and dry. Neurological:      Mental Status: He is alert and oriented to person, place, and time. Cranial Nerves: Cranial nerves are intact. No cranial nerve deficit. Sensory: Sensation is intact. Motor: Motor function is intact. Coordination: Coordination is intact. Romberg sign negative. Coordination normal. Finger-Nose-Finger Test normal.      Gait: Gait is intact. Gait normal.      Comments: Alert and oriented x3. Sensation intact and equal bilateral upper and lower extremities. Station intact and equal bilaterally on face. Muscle strength 5/5 bilateral upper lower extremities. No facial droop noted. Romberg negative. Gait intact. Coordination intact. Procedures     MDM  Number of Diagnoses or Management Options  Elevated serum creatinine:   Hypertensive urgency:   Nonintractable headache, unspecified chronicity pattern, unspecified headache type:   Diagnosis management comments: Patient's CT head is unremarkable for acute changes. His neurovascular exam remained unremarkable while in the ED. He is given IV fluids with improvement of his symptoms. He is discharged in stable condition with instructions to follow-up with his PCP. EKG: This EKG is signed and interpreted by me. Rate: 87  Rhythm: Sinus  Interpretation: non-specific T-wave changes and LVH  Comparison: changes compared to previous EKG           --------------------------------------------- PAST HISTORY ---------------------------------------------  Past Medical History:  has a past medical history of Hepatitis C, HIV (human immunodeficiency virus infection) ([facility] [address] and Hypertension. Past Surgical History:  has a past surgical history that includes liver biopsy (01/2017); pyloromyotomy (1963); and Colonoscopy (N/A, 12/11/2018). Social History:  reports that he quit smoking about 24 years ago. His smoking use included cigarettes. He started smoking about 40 years ago. He smoked 0.10 packs per day. He has never used smokeless tobacco. He reports current alcohol use.  He reports that he does not use Duration 92 ms    Q-T Interval 364 ms    QTc Calculation (Bazett) 438 ms    P Axis 32 degrees    R Axis 48 degrees    T Axis -5 degrees       Radiology:  CT Head WO Contrast   Final Result   No acute intracranial process. Mild to moderate chronic ischemic/degenerative changes in the white   matter for the patient's age. Consider MRI without and with contrast   for further evaluation         XR CHEST PORTABLE   Final Result   No airspace opacities or pleural effusion.                                      ------------------------- NURSING NOTES AND VITALS REVIEWED ---------------------------  Date / Time Roomed:  3/3/2020  7:06 AM  ED Bed Assignment:  26/26    The nursing notes within the ED encounter and vital signs as below have been reviewed. BP (!) 139/104   Pulse 79   Temp 98.6 °F (37 °C) (Oral)   Resp 18   Ht 5' 7\" (1.702 m)   Wt 200 lb (90.7 kg)   SpO2 97%   BMI 31.32 kg/m²   Oxygen Saturation Interpretation: Normal      ------------------------------------------ PROGRESS NOTES ------------------------------------------  8:50 AM  Patient is not in distress. He states he feels the same. 10:11 AM  Patient states he feels better. He is not in distress. 10:27 AM  I have spoken with the patient and discussed todays results, in addition to providing specific details for the plan of care and counseling regarding the diagnosis and prognosis. Their questions are answered at this time and they are agreeable with the plan. I discussed at length with them reasons for immediate return here for re evaluation. They will followup with their primary care physician by calling their office tomorrow. --------------------------------- ADDITIONAL PROVIDER NOTES ---------------------------------  At this time the patient is without objective evidence of an acute process requiring hospitalization or inpatient management.   They have remained hemodynamically stable throughout their entire ED visit and are

## 2021-04-15 ENCOUNTER — TELEPHONE (OUTPATIENT)
Dept: SURGERY | Age: 56
End: 2021-04-15

## 2021-04-15 NOTE — TELEPHONE ENCOUNTER
MA received a note from PCP stating that it is time for pt to have 1 year repeat colonoscopy with Dr. Luz Maria Marmolejo MA lvm ith pt to return call to schedule 1 year repeat colonoscopy consult wit Dr. Luz Maria Marmolejo. MA awaiting return call.   Electronically signed by Krysta Orellana on 4/15/21 at 4:19 PM EDT

## 2021-04-16 ENCOUNTER — TELEPHONE (OUTPATIENT)
Dept: SURGERY | Age: 56
End: 2021-04-16

## 2021-04-19 ENCOUNTER — TELEPHONE (OUTPATIENT)
Dept: BREAST CENTER | Age: 56
End: 2021-04-19

## 2021-04-29 ENCOUNTER — OFFICE VISIT (OUTPATIENT)
Dept: SURGERY | Age: 56
End: 2021-04-29
Payer: COMMERCIAL

## 2021-04-29 ENCOUNTER — PREP FOR PROCEDURE (OUTPATIENT)
Dept: SURGERY | Age: 56
End: 2021-04-29

## 2021-04-29 VITALS
OXYGEN SATURATION: 100 % | BODY MASS INDEX: 31.23 KG/M2 | DIASTOLIC BLOOD PRESSURE: 103 MMHG | TEMPERATURE: 99 F | SYSTOLIC BLOOD PRESSURE: 141 MMHG | RESPIRATION RATE: 16 BRPM | HEIGHT: 67 IN | HEART RATE: 93 BPM | WEIGHT: 199 LBS

## 2021-04-29 DIAGNOSIS — Z86.010 HISTORY OF COLON POLYPS: Primary | Chronic | ICD-10-CM

## 2021-04-29 DIAGNOSIS — N18.2 CHRONIC KIDNEY DISEASE, STAGE II (MILD): Chronic | ICD-10-CM

## 2021-04-29 DIAGNOSIS — I10 HYPERTENSION, UNSPECIFIED TYPE: Chronic | ICD-10-CM

## 2021-04-29 DIAGNOSIS — K42.9 UMBILICAL HERNIA WITHOUT OBSTRUCTION AND WITHOUT GANGRENE: Chronic | ICD-10-CM

## 2021-04-29 DIAGNOSIS — B20 HUMAN IMMUNODEFICIENCY VIRUS (HIV) DISEASE (HCC): Chronic | ICD-10-CM

## 2021-04-29 PROCEDURE — 99212 OFFICE O/P EST SF 10 MIN: CPT | Performed by: SURGERY

## 2021-04-29 PROCEDURE — 99214 OFFICE O/P EST MOD 30 MIN: CPT | Performed by: SURGERY

## 2021-04-29 RX ORDER — SODIUM CHLORIDE, SODIUM LACTATE, POTASSIUM CHLORIDE, CALCIUM CHLORIDE 600; 310; 30; 20 MG/100ML; MG/100ML; MG/100ML; MG/100ML
INJECTION, SOLUTION INTRAVENOUS CONTINUOUS
Status: CANCELLED | OUTPATIENT
Start: 2021-04-29

## 2021-04-29 RX ORDER — SODIUM CHLORIDE 0.9 % (FLUSH) 0.9 %
10 SYRINGE (ML) INJECTION PRN
Status: CANCELLED | OUTPATIENT
Start: 2021-04-29

## 2021-04-29 RX ORDER — LABETALOL 100 MG/1
100 TABLET, FILM COATED ORAL 2 TIMES DAILY
Status: ON HOLD | COMMUNITY
Start: 2021-04-23 | End: 2022-07-02 | Stop reason: HOSPADM

## 2021-04-29 RX ORDER — SODIUM PICOSULFATE, MAGNESIUM OXIDE, AND ANHYDROUS CITRIC ACID 10; 3.5; 12 MG/160ML; G/160ML; G/160ML
LIQUID ORAL
Qty: 1 KIT | Refills: 0 | Status: ON HOLD
Start: 2021-04-29 | End: 2021-06-18 | Stop reason: HOSPADM

## 2021-04-29 RX ORDER — SODIUM CHLORIDE 0.9 % (FLUSH) 0.9 %
10 SYRINGE (ML) INJECTION EVERY 12 HOURS SCHEDULED
Status: CANCELLED | OUTPATIENT
Start: 2021-04-29

## 2021-04-29 RX ORDER — SODIUM CHLORIDE 9 MG/ML
25 INJECTION, SOLUTION INTRAVENOUS PRN
Status: CANCELLED | OUTPATIENT
Start: 2021-04-29

## 2021-04-29 NOTE — PROGRESS NOTES
History and Physical    Patient's Name/Date of Birth: Codi Szymanski /1965, (56 y.o.), male    Date: April 29, 2021     Assessment/Plan:  1. History of colon polyps with dysplasia - I informed the patient that I had recommended follow-up colonoscopy in 1 year however he is overdue for this. He understands. I recommended high risk screening colonoscopy with possible biopsy or polypectomy and explained the risk, benefits, expected outcome, and alternatives to the procedure. Risks included but are not limited to bleeding, infection, respiratory distress, hypotension, and perforation of the colon. The patient understands and is in agreement. 2. Small reducible asymptomatic umbilical hernia - patient was recommended to observe for any increase in size or development of any symptoms. He was to return to see me if any of these should occur. He understood. 3. HIV - patient is followed by Dr. Marva Brown, infectious disease. Patient states that this is under control with medications. 4. Chronic kidney disease - patient is followed by Dr. Kaelyn Quiles and this has been stable. 5. Essential hypertension - his BP was elevated on exam today. However, patient states he was not able to take his medication today and is been stressed at work which is what he attributes this to. Informed he needs to get his blood pressure under control prior to his colonoscopy with anesthesia may cancel IV conscious sedation. He understood. 6. Chronic hepatitis C - per patient this is stable with no problems. Chief Complaint:  Chief Complaint   Patient presents with    Consultation     pt is here for repeat colonoscopy consult, pt last scope was 2018 with Dr. Vikas Monteiro. Pt denies any current issues. HPI:   Patient seen in the office today for the above problems. I performed a colonoscopy on him on 12/11/2018 for low risk colorectal cancer screening.   He was found to have 2 polyps in the distal ascending colon that removed with snare polypectomy and retrieved. He also had a rectal polyp that was removed with biopsy and cauterized. The ascending colon polyps were tubular adenomas. The rectal polyp revealed a low-grade squamous intraepithelial lesion with mild dysplasia. Due to the dysplasia, I recommend he have a repeat colonoscopy in 1 year but he failed to follow-up for this. Patient returns now for follow-up colonoscopy. He has mild irregularity of his bowels with normal bowel movement every day to every other day. He denied any diarrhea, blood in his stool, or blood when he wipes. He denied any heartburn, indigestion, nausea, vomiting. His family history is negative for colon cancer or colon polyps. Past Medical History:   Diagnosis Date    Hepatitis C     Had treatment     HIV (human immunodeficiency virus infection) ([facility] [address]     Hypertension        Past Surgical History:   Procedure Laterality Date    COLONOSCOPY N/A 2018    Distal ascending colon polyps ×2 removed with snare polypectomy ×1 and biopsy and cauterized x 1 (both Tubular Adenomas), rectal polyp removed with biopsy cauterization (squamous dysplasia), Dr. Neftaly Bonds, [phone] LIVER BIOPSY  2017    Chronic Hepatitis C, 1957 [employer]    for pyloric stenosis as        Current Outpatient Medications   Medication Sig Dispense Refill    labetalol (NORMODYNE) 100 MG tablet       Emdlrve-Oduvz-Gmtcnojf-TenofAF (GENVOYA) [id number] MG TABS Take 1 tablet by mouth every evening      VITAMIN D, ERGOCALCIFEROL, PO Take 2,000 Units by mouth daily daily      amLODIPine (NORVASC) 10 MG tablet Take 10 mg by mouth every morning       hydrALAZINE (APRESOLINE) 25 MG tablet Take 50 mg by mouth 3 times daily TAKES AT 1200 AND 2000      lisinopril (PRINIVIL;ZESTRIL) 40 MG tablet Take 40 mg by mouth every morning        No current facility-administered medications for this visit.         Allergies   Allergen Reactions    Penicillins Rash       Review of Systems  Non-contributory    Physical Exam:  Vitals:    04/29/21 1306   BP: (!) 141/103   Site: Right Upper Arm   Position: Sitting   Cuff Size: Large Adult   Pulse: 93   Resp: 16   Temp: 99 °F (37.2 °C)   TempSrc: Oral   SpO2: 100%   Weight: 199 lb (90.3 kg)   Height: 5' 7\" (1.702 m)       Body mass index is 31.17 kg/m². Physical Exam  Vitals signs reviewed. Constitutional:       General: He is not in acute distress. Appearance: He is well-developed. He is not diaphoretic. HENT:      Head: Normocephalic and atraumatic. Neck:      Thyroid: No thyroid mass or thyromegaly. Trachea: No tracheal deviation. Cardiovascular:      Rate and Rhythm: Normal rate and regular rhythm. Heart sounds: Normal heart sounds. No murmur. No friction rub. No gallop. Pulmonary:      Effort: Pulmonary effort is normal. No respiratory distress. Breath sounds: Normal breath sounds. No stridor. No wheezing or rales. Abdominal:      General: Bowel sounds are normal. There is no distension. Palpations: Abdomen is soft. There is no mass. Tenderness: There is no abdominal tenderness. There is no guarding or rebound. Hernia: A hernia is present. Hernia is present in the umbilical area (Small, reducible, asymptomatic umbilical hernia). There is no hernia in the ventral area, left inguinal area or right inguinal area. Musculoskeletal: Normal range of motion. General: No tenderness. Lymphadenopathy:      Cervical: No cervical adenopathy. Skin:     General: Skin is warm and dry. Findings: No erythema or rash. Neurological:      Mental Status: He is alert and oriented to person, place, and time.    Psychiatric:         Behavior: Behavior normal.         Judgment: Judgment normal.     Electronically signed by Priyanka Johnson MD on 4/29/21 at 1:27 PM EDT

## 2021-04-29 NOTE — H&P
History and Physical    Patient's Name/Date of Birth: Kaleb Si /1965, (56 y.o.), male    Date: April 29, 2021     Assessment/Plan:  1. History of colon polyps with dysplasia - I informed the patient that I had recommended follow-up colonoscopy in 1 year however he is overdue for this. He understands. I recommended high risk screening colonoscopy with possible biopsy or polypectomy and explained the risk, benefits, expected outcome, and alternatives to the procedure. Risks included but are not limited to bleeding, infection, respiratory distress, hypotension, and perforation of the colon. The patient understands and is in agreement. 2. Small reducible asymptomatic umbilical hernia - patient was recommended to observe for any increase in size or development of any symptoms. He was to return to see me if any of these should occur. He understood. 3. HIV - patient is followed by Dr. Shane Ruiz, infectious disease. Patient states that this is under control with medications. 4. Chronic kidney disease - patient is followed by Dr. Mary Lou Sanderson and this has been stable. 5. Essential hypertension - his BP was elevated on exam today. However, patient states he was not able to take his medication today and is been stressed at work which is what he attributes this to. Informed he needs to get his blood pressure under control prior to his colonoscopy with anesthesia may cancel IV conscious sedation. He understood. 6. Chronic hepatitis C - per patient this is stable with no problems. Chief Complaint:  Chief Complaint   Patient presents with    Consultation     pt is here for repeat colonoscopy consult, pt last scope was 2018 with Dr. Angelica Meeks. Pt denies any current issues. HPI:   Patient seen in the office today for the above problems. I performed a colonoscopy on him on 12/11/2018 for low risk colorectal cancer screening.   He was found to have 2 polyps in the distal ascending colon that removed with snare polypectomy and retrieved. He also had a rectal polyp that was removed with biopsy and cauterized. The ascending colon polyps were tubular adenomas. The rectal polyp revealed a low-grade squamous intraepithelial lesion with mild dysplasia. Due to the dysplasia, I recommend he have a repeat colonoscopy in 1 year but he failed to follow-up for this. Patient returns now for follow-up colonoscopy. He has mild irregularity of his bowels with normal bowel movement every day to every other day. He denied any diarrhea, blood in his stool, or blood when he wipes. He denied any heartburn, indigestion, nausea, vomiting. His family history is negative for colon cancer or colon polyps. Past Medical History:   Diagnosis Date    Hepatitis C     Had treatment     HIV [facility]human immunodeficiency virus infection) ([facility]     Hypertension        Past Surgical History:   Procedure Laterality Date    COLONOSCOPY N/A 2018    Distal ascending colon polyps ×2 removed with snare polypectomy ×1 and biopsy and cauterized x 1 (both Tubular Adenomas), rectal polyp removed with biopsy cauterization (squamous dysplasia), Dr. Daljit Zuniga, [phone] LIVER BIOPSY  2017    Chronic Hepatitis C, 1957 [place]    for pyloric stenosis as        Current Outpatient Medications   Medication Sig Dispense Refill    labetalol (NORMODYNE) 100 MG tablet       Hxmaefs-Lgzjo-Wfcjxmui-TenofAF (GENVOYA) [id number] MG TABS Take 1 tablet by mouth every evening      VITAMIN D, ERGOCALCIFEROL, PO Take 2,000 Units by mouth daily daily      amLODIPine (NORVASC) 10 MG tablet Take 10 mg by mouth every morning       hydrALAZINE (APRESOLINE) 25 MG tablet Take 50 mg by mouth 3 times daily TAKES AT 1200 AND 2000      lisinopril (PRINIVIL;ZESTRIL) 40 MG tablet Take 40 mg by mouth every morning        No current facility-administered medications for this visit.         Allergies   Allergen Reactions    Penicillins Rash       Review of Systems  Non-contributory    Physical Exam:  Vitals:    04/29/21 1306   BP: (!) 141/103   Site: Right Upper Arm   Position: Sitting   Cuff Size: Large Adult   Pulse: 93   Resp: 16   Temp: 99 °F (37.2 °C)   TempSrc: Oral   SpO2: 100%   Weight: 199 lb (90.3 kg)   Height: 5' 7\" (1.702 m)       Body mass index is 31.17 kg/m². Physical Exam  Vitals signs reviewed. Constitutional:       General: He is not in acute distress. Appearance: He is well-developed. He is not diaphoretic. HENT:      Head: Normocephalic and atraumatic. Neck:      Thyroid: No thyroid mass or thyromegaly. Trachea: No tracheal deviation. Cardiovascular:      Rate and Rhythm: Normal rate and regular rhythm. Heart sounds: Normal heart sounds. No murmur. No friction rub. No gallop. Pulmonary:      Effort: Pulmonary effort is normal. No respiratory distress. Breath sounds: Normal breath sounds. No stridor. No wheezing or rales. Abdominal:      General: Bowel sounds are normal. There is no distension. Palpations: Abdomen is soft. There is no mass. Tenderness: There is no abdominal tenderness. There is no guarding or rebound. Hernia: A hernia is present. Hernia is present in the umbilical area (Small, reducible, asymptomatic umbilical hernia). There is no hernia in the ventral area, left inguinal area or right inguinal area. Musculoskeletal: Normal range of motion. General: No tenderness. Lymphadenopathy:      Cervical: No cervical adenopathy. Skin:     General: Skin is warm and dry. Findings: No erythema or rash. Neurological:      Mental Status: He is alert and oriented to person, place, and time.    Psychiatric:         Behavior: Behavior normal.         Judgment: Judgment normal.     Electronically signed by Chelsie Shelby MD on 4/29/21 at 1:27 PM EDT

## 2021-04-29 NOTE — LETTER
[address]  [address], [place], [address]  [phone] (Fax)    April 29, 2021     Lukas Faye MD  [address] [place] [address], [address] [place] [address]    Patient: Juan Galicia   YOB: 1965   Date of Visit: 4/29/2021       Dear Narciso Spaulding:    Thank you for referring Curt Galaviz to me for evaluation. Attached is my office note. If you have questions, please do not hesitate to call me. I look forward to following this patient along with you. Sincerely,    Electronically signed by Jackson Malloy MD on 4/29/21 at 2:17 PM EDT                  History and Physical    Patient's Name/Date of Birth: Juan Galicia /1965, (60 y.o.), male    Date: April 29, 2021     Assessment/Plan:  1. History of colon polyps with dysplasia - I informed the patient that I had recommended follow-up colonoscopy in 1 year however he is overdue for this. He understands. I recommended high risk screening colonoscopy with possible biopsy or polypectomy and explained the risk, benefits, expected outcome, and alternatives to the procedure. Risks included but are not limited to bleeding, infection, respiratory distress, hypotension, and perforation of the colon. The patient understands and is in agreement. 2. Small reducible asymptomatic umbilical hernia - patient was recommended to observe for any increase in size or development of any symptoms. He was to return to see me if any of these should occur. He understood. 3. HIV - patient is followed by Dr. Anum Collins, infectious disease. Patient states that this is under control with medications. 4. Chronic kidney disease - patient is followed by Dr. Tomasa Gavin and this has been stable. 5. Essential hypertension - his BP was elevated on exam today.   However, patient states he was not able to take his medication today and is been stressed at work which is what he attributes this to. Informed he needs to get his blood pressure under control prior to his colonoscopy with anesthesia may cancel IV conscious sedation. He understood. 6. Chronic hepatitis C - per patient this is stable with no problems. Chief Complaint:  Chief Complaint   Patient presents with    Consultation     pt is here for repeat colonoscopy consult, pt last scope was 2018 with Dr. Luz Maria Marmolejo. Pt denies any current issues. HPI:   Patient seen in the office today for the above problems. I performed a colonoscopy on him on 12/11/2018 for low risk colorectal cancer screening. He was found to have 2 polyps in the distal ascending colon that removed with snare polypectomy and retrieved. He also had a rectal polyp that was removed with biopsy and cauterized. The ascending colon polyps were tubular adenomas. The rectal polyp revealed a low-grade squamous intraepithelial lesion with mild dysplasia. Due to the dysplasia, I recommend he have a repeat colonoscopy in 1 year but he failed to follow-up for this. Patient returns now for follow-up colonoscopy. He has mild irregularity of his bowels with normal bowel movement every day to every other day. He denied any diarrhea, blood in his stool, or blood when he wipes. He denied any heartburn, indigestion, nausea, vomiting. His family history is negative for colon cancer or colon polyps.      Past Medical History:   Diagnosis Date    Hepatitis C     Had treatment 2017    HIV (human immunodeficiency virus infection) ([facility] [address]     Hypertension        Past Surgical History:   Procedure Laterality Date    COLONOSCOPY N/A 12/11/2018    Distal ascending colon polyps ×2 removed with snare polypectomy ×1 and biopsy and cauterized x 1 (both Tubular Adenomas), rectal polyp removed with biopsy cauterization (squamous dysplasia), Dr. Luz Maria Marmolejo, [phone] LIVER BIOPSY  01/2017    Chronic Hepatitis C, 1957 [employer]    for pyloric stenosis as        Current Outpatient Medications   Medication Sig Dispense Refill    labetalol (NORMODYNE) 100 MG tablet       Xjhofze-Zkawq-Qojcejiy-TenofAF (GENVOYA) [phone] MG TABS Take 1 tablet by mouth every evening      VITAMIN D, ERGOCALCIFEROL, PO Take 2,000 Units by mouth daily daily      amLODIPine (NORVASC) 10 MG tablet Take 10 mg by mouth every morning       hydrALAZINE (APRESOLINE) 25 MG tablet Take 50 mg by mouth 3 times daily TAKES AT 1200 AND 2000      lisinopril (PRINIVIL;ZESTRIL) 40 MG tablet Take 40 mg by mouth every morning        No current facility-administered medications for this visit. Allergies   Allergen Reactions    Penicillins Rash       Review of Systems  Non-contributory    Physical Exam:  Vitals:    21 1306   BP: (!) 141/103   Site: Right Upper Arm   Position: Sitting   Cuff Size: Large Adult   Pulse: 93   Resp: 16   Temp: 99 °F (37.2 °C)   TempSrc: Oral   SpO2: 100%   Weight: 199 lb (90.3 kg)   Height: 5' 7\" (1.702 m)       Body mass index is 31.17 kg/m². Physical Exam  Vitals signs reviewed. Constitutional:       General: He is not in acute distress. Appearance: He is well-developed. He is not diaphoretic. HENT:      Head: Normocephalic and atraumatic. Neck:      Thyroid: No thyroid mass or thyromegaly. Trachea: No tracheal deviation. Cardiovascular:      Rate and Rhythm: Normal rate and regular rhythm. Heart sounds: Normal heart sounds. No murmur. No friction rub. No gallop. Pulmonary:      Effort: Pulmonary effort is normal. No respiratory distress. Breath sounds: Normal breath sounds. No stridor. No wheezing or rales. Abdominal:      General: Bowel sounds are normal. There is no distension. Palpations: Abdomen is soft. There is no mass. Tenderness: There is no abdominal tenderness. There is no guarding or rebound. Hernia: A hernia is present.  Hernia is present in the umbilical area (Small, reducible, asymptomatic umbilical hernia). There is no hernia in the ventral area, left inguinal area or right inguinal area. Musculoskeletal: Normal range of motion. General: No tenderness. Lymphadenopathy:      Cervical: No cervical adenopathy. Skin:     General: Skin is warm and dry. Findings: No erythema or rash. Neurological:      Mental Status: He is alert and oriented to person, place, and time. Psychiatric:         Behavior: Behavior normal.         Judgment: Judgment normal.     Electronically signed by Jacqueline George MD on 4/29/21 at 1:27 PM EDT    Scheduled pt for Screening Colonoscopy on 6/18/21 at 8:15 AM. Pt needs to arrive at [address] [place] [address] at 7:00 AM. Confirmed in office. Sent instruction sheet.   Electronically signed by Tirso Martinez on 4/29/21 at 1:42 PM EDT

## 2021-04-29 NOTE — PATIENT INSTRUCTIONS
Dr. Daljit Zuniga recommended colonoscopy with possible biopsy or polypectomy and he explained the risk, benefits, expected outcome, and alternatives to the procedure. Risks included but are not limited to bleeding, infection, respiratory distress, hypotension, and perforation of the colon. You understood and were in agreement. You will need to have someone bring you to the hospital and take you home because you will not be able to drive or work the rest of that day. Also, you need to have someone stay with you the rest of the day to make sure you do not develop any complications. [facility] General Surgery  CLENPIQ SPLIT COLON PREP  COLON PREP FOR COLONOSCOPY OR COLON SURGERY    It is very important that you follow all of the instructions listed on this sheet carefully (they may be slightly different than the directions on the product that you purchase at the pharmacy) to ensure that your colon is adequately cleaned out or your risk of complications could be increased. 2 Days or More Before Endoscopy:  [address] prep from the pharmacy.  Do not eat corn, tomatoes, peas or watermelon for 5 days before procedure.  If you are on INSULIN or OTHER DIABETIC MEDICATIONS, then check with your primary care physician as to how to adjust your medication while on clear liquid diet and when nothing by mouth. 1 Day Before the Endoscopy:   No solid food  only clear liquids (soup, jello, or juice that you can see through with no solid food) for breakfast, lunch and supper. DO NOT drink or eat anything that is red as it will turn the inside of the colon red and look like blood.  Have at least 8 oz or more of clear liquids for breakfast (7 am to 8 am) and lunch (11:30 am to 12:30 pm).    5 pm, take first 5.4 ounce bottle of CLENPIQ   Over the next 4 to 5 hours drink at least five 8 ounce cups of any of the above clear liquids   You can continue to take liquids until 12 midnight then nothing to abdominal surgery or radiation treatments   Active colitis , diverticulitis , or other acute bowel disease   Previous treatment with radiation therapy     Be sure to discuss these risks with your doctor before the procedure. What to Expect   Prior to Procedure   Your doctor will likely do the following:   Physical exam   Health history   Review of medicines   Test your stool for hidden blood (called \"occult blood\")     Your colon must be completely clean before the procedure. Any stool left in the intestine will block the view. This preparation may start several days before the procedure. Follow your doctor's instructions. Leading up to your procedure:   Talk to your doctor about your medicines. You may be asked to stop taking some medicines up to one week before the procedure, like:   Anti-inflammatory drugs (e.g., aspirin )   Blood thinners like clopidogrel (Plavix) or warfarin (Coumadin)   Iron supplements or vitamins containing iron   The day or days before your procedure, go on a clear liquid diet (clear broth, clear juice, clear jello) with no red coloring  Do not eat or drink anything after midnight. Wear comfortable clothing. If you have diabetes, ask your doctor if you need to adjust your diabetes medicine on the day prior to your procedure and the day of your procedure. Arrange for a ride home after the procedure. Anesthesia   You will receive intravenous sedation medicine for the procedure so you will not feel anything during the procedure. Description of the Procedure   You will lie on your left side with knees bent and drawn up toward your chest. The colonoscope will be slowly inserted through the rectum and into the bowel. The colonoscope will inject air into the colon. A small attached video camera will allow the doctor to view the colon's lining on a screen. The doctor will continue guiding the tool through the bowel and assess the lining.  A tissue sample or polyps may be removed during the procedure. How Long Will It Take? Usually it takes about 30 to 45 minutes     Will It Hurt? Most people do not feel anything during the procedure and will not remember the procedure. After the procedure, gas pains and cramping are common. These pains should go away with the passing of gas. Post-procedure Care   If any tissue was removed: It will be sent to a lab to be examined. It may take 1-2 weeks for results. The doctor will usually give an initial report after the scope is removed. Other tests may be recommended. A small amount of bleeding may occur during the first few days after the procedure. When you return home after the procedure, be sure to follow your doctor's instructions, which may include:   Resume medicines as instructed by your doctor. Resume normal diet, unless directed otherwise by your doctor. The sedative will make you drowsy. Avoid driving, operating machinery, or making important decisions for the rest of the day. Rest for the remainder of the day. After arriving home, contact your doctor if any of the following occurs:   Bleeding from your rectum, notify your doctor if you pass a teaspoonful of blood or more. Black, tarry stools   Severe abdominal pain   Hard, swollen abdomen   Signs of infection, including fever or chills   Inability to pass gas or stool   Coughing, shortness of breath, chest pain, severe nausea or vomiting     In case of an emergency, CALL 911 .

## 2021-04-29 NOTE — PROGRESS NOTES
Scheduled pt for Screening Colonoscopy on 6/18/21 at 8:15 AM. Pt needs to arrive at [address] [place] [address] [place] at 7:00 AM. Confirmed in office. Sent instruction sheet.   Electronically signed by [employer] on 4/29/21 at 1:42 PM EDT

## 2021-04-30 ENCOUNTER — TELEPHONE (OUTPATIENT)
Dept: SURGERY | Age: 56
End: 2021-04-30

## 2021-04-30 NOTE — TELEPHONE ENCOUNTER
Prior Authorization Form:      DEMOGRAPHICS:                     Patient Name:  Jolanta Gonzales  Patient :  1965            Insurance:  Payor: [employer] / Plan: [employer] [address] / Product Type: *No Product type* /   Insurance ID Number:    Payor/Plan Subscr  Sex Relation Sub. Ins. ID Effective Group Num   1.  MEDICAL MUTUARonaustyn Vance 1965 Male Self [id number] 3/2/20 [id number]                                   [address]         DIAGNOSIS & PROCEDURE:                       Procedure/Operation: SCREENING COLONOSCOPY           CPT Code: 60787    Diagnosis:  HISTORY OF COLON POLYPS    ICD10 Code: Z86.010    Location:  [facility]    Surgeon:  DR. Jaci Fuentes    SCHEDULING INFORMATION:                          Date: 21    Time: 8:15 AM              Anesthesia:  [facility]                                                       Status:  Outpatient        Special Comments:  N/A       Electronically signed by Margaret Chacon on 2021 at 4:08 PM

## 2021-06-17 NOTE — PROGRESS NOTES
[address] PRE-ADMISSION TESTING ENDOSCOPY INSTRUCTIONS- [facility]-phone number:[phone]    ENDOSCOPY INSTRUCTIONS:   [x] Bowel prep instructions reviewed. [x] Nothing by mouth after midnight, including gum, candy, mints, or water. Please follow your surgeons instructions if you are required to complete a bowel prep. Colonoscopy- no solid food-only clear liquids the day prior). [x] You may brush your teeth, gargle, but do NOT swallow water. [x] Do not wear makeup, lotions, powders, deodorant. Nail polish as directed by the nurse. [x] Arrange transportation with a responsible adult  to and from the hospital. If you do not have a responsible adult  to transport you, you will need to make arrangements with a medical transportation company (i.e. [employer]. A Uber/taxi/bus is not appropriate unless you are accompanied by a responsible adult ). Arrange for someone to be with you for the remainder of the day and for 24 hours after your procedure due to having had anesthesia. Who will be your  for transportation?___Darla____________   Who will be staying with you for 24 hrs after your procedure?__________________    PARKING INSTRUCTIONS:   [x] Arrival Time:___0515__________________  · [x] Parking lot  \"I\" OR 1 is located on [facility] (the corner of [facility]). To enter, press the button and the gate will lift. A free token will be provided to exit the lot. One car per patient is allowed to park in this lot. All other cars are to park on [address] [place] [address] either in the parking garage or the handicap lot. [] To reach the [facility] lobby from [address] [place] [address], upon entering the hospital, take elevator B to the 3rd floor. EDUCATION INSTRUCTIONS:  [x] Bring a complete list of your medications, please write the last time you took the medicine, give this list to the nurse.   [x] Take the following medications the morning of surgery with 1-2 ounces of water:  Hydralazine, labetalol, and norvasc  [x] Stop herbal supplements and vitamins 5 days before your surgery. [] DO NOT take any diabetic medicine the morning of surgery. Follow instructions for insulin the day before surgery. [] If you are diabetic and your blood sugar is low or you feel symptomatic, you may drink 1-2 ounces of apple juice or take a glucose tablet. The morning of your procedure, you may call the pre-op area if you have concerns about your blood sugar [phone]. [] Use your inhalers the morning of surgery. Bring your emergency inhaler with you day of surgery. [x] Follow physician instructions regarding any blood thinners you may be taking. WHAT TO EXPECT:  [x] The day of your procedure you will be greeted and checked in by the [employer].  In addition, you will be registered in the [place] by a Patient Access Representative. Please bring your photo ID and insurance card. A nurse will greet you in accordance to the time you are needed in the pre-op area to prepare you for surgery. Please do not be discouraged if you are not greeted in the order you arrive as there are many variables that are involved in patient preparation. Your patience is greatly appreciated as you wait for your nurse. Please bring in items such as: books, magazines, newspapers, electronics, or any other items  to occupy your time in the waiting area. [x]  Delays may occur. Staff will make a sincere effort to keep you informed of delays. If any delays occur with your procedure, we apologize ahead of time for your inconvenience as we recognize the value of your time.

## 2021-06-18 ENCOUNTER — HOSPITAL ENCOUNTER (OUTPATIENT)
Age: 56
Setting detail: OUTPATIENT SURGERY
Discharge: HOME | End: 2021-06-18
Attending: SURGERY | Admitting: SURGERY
Payer: COMMERCIAL

## 2021-06-18 ENCOUNTER — ANESTHESIA (OUTPATIENT)
Dept: ENDOSCOPY | Age: 56
End: 2021-06-18
Payer: COMMERCIAL

## 2021-06-18 ENCOUNTER — ANESTHESIA EVENT (OUTPATIENT)
Dept: ENDOSCOPY | Age: 56
End: 2021-06-18
Payer: COMMERCIAL

## 2021-06-18 VITALS
HEIGHT: 67 IN | OXYGEN SATURATION: 97 % | HEART RATE: 79 BPM | SYSTOLIC BLOOD PRESSURE: 132 MMHG | RESPIRATION RATE: 18 BRPM | WEIGHT: 199 LBS | DIASTOLIC BLOOD PRESSURE: 70 MMHG | BODY MASS INDEX: 31.23 KG/M2 | TEMPERATURE: 98.3 F

## 2021-06-18 VITALS
RESPIRATION RATE: 12 BRPM | OXYGEN SATURATION: 97 % | DIASTOLIC BLOOD PRESSURE: 63 MMHG | SYSTOLIC BLOOD PRESSURE: 96 MMHG

## 2021-06-18 LAB — SARS-COV-2, NAAT: NOT DETECTED

## 2021-06-18 PROCEDURE — 87635 SARS-COV-2 COVID-19 AMP PRB: CPT

## 2021-06-18 PROCEDURE — 7100000010 HC PHASE II RECOVERY - FIRST 15 MIN: Performed by: SURGERY

## 2021-06-18 PROCEDURE — 2709999900 HC NON-CHARGEABLE SUPPLY: Performed by: SURGERY

## 2021-06-18 PROCEDURE — 6360000002 HC RX W HCPCS: Performed by: NURSE ANESTHETIST, CERTIFIED REGISTERED

## 2021-06-18 PROCEDURE — 45378 DIAGNOSTIC COLONOSCOPY: CPT | Performed by: SURGERY

## 2021-06-18 PROCEDURE — 3700000001 HC ADD 15 MINUTES (ANESTHESIA): Performed by: SURGERY

## 2021-06-18 PROCEDURE — 3609027000 HC COLONOSCOPY: Performed by: SURGERY

## 2021-06-18 PROCEDURE — 3700000000 HC ANESTHESIA ATTENDED CARE: Performed by: SURGERY

## 2021-06-18 PROCEDURE — 7100000011 HC PHASE II RECOVERY - ADDTL 15 MIN: Performed by: SURGERY

## 2021-06-18 PROCEDURE — 2580000003 HC RX 258: Performed by: SURGERY

## 2021-06-18 PROCEDURE — 2580000003 HC RX 258: Performed by: NURSE ANESTHETIST, CERTIFIED REGISTERED

## 2021-06-18 RX ORDER — SODIUM CHLORIDE, SODIUM LACTATE, POTASSIUM CHLORIDE, CALCIUM CHLORIDE 600; 310; 30; 20 MG/100ML; MG/100ML; MG/100ML; MG/100ML
INJECTION, SOLUTION INTRAVENOUS CONTINUOUS
Status: DISCONTINUED | OUTPATIENT
Start: 2021-06-18 | End: 2021-06-18 | Stop reason: HOSPADM

## 2021-06-18 RX ORDER — SODIUM CHLORIDE 0.9 % (FLUSH) 0.9 %
10 SYRINGE (ML) INJECTION EVERY 12 HOURS SCHEDULED
Status: DISCONTINUED | OUTPATIENT
Start: 2021-06-18 | End: 2021-06-18 | Stop reason: HOSPADM

## 2021-06-18 RX ORDER — PROPOFOL 10 MG/ML
INJECTION, EMULSION INTRAVENOUS PRN
Status: DISCONTINUED | OUTPATIENT
Start: 2021-06-18 | End: 2021-06-18 | Stop reason: SDUPTHER

## 2021-06-18 RX ORDER — SODIUM CHLORIDE 0.9 % (FLUSH) 0.9 %
10 SYRINGE (ML) INJECTION PRN
Status: DISCONTINUED | OUTPATIENT
Start: 2021-06-18 | End: 2021-06-18 | Stop reason: HOSPADM

## 2021-06-18 RX ORDER — SODIUM CHLORIDE 9 MG/ML
25 INJECTION, SOLUTION INTRAVENOUS PRN
Status: DISCONTINUED | OUTPATIENT
Start: 2021-06-18 | End: 2021-06-18 | Stop reason: HOSPADM

## 2021-06-18 RX ORDER — SODIUM CHLORIDE, SODIUM LACTATE, POTASSIUM CHLORIDE, CALCIUM CHLORIDE 600; 310; 30; 20 MG/100ML; MG/100ML; MG/100ML; MG/100ML
INJECTION, SOLUTION INTRAVENOUS CONTINUOUS PRN
Status: DISCONTINUED | OUTPATIENT
Start: 2021-06-18 | End: 2021-06-18 | Stop reason: SDUPTHER

## 2021-06-18 RX ADMIN — PROPOFOL 360 MG: 10 INJECTION, EMULSION INTRAVENOUS at 09:00

## 2021-06-18 RX ADMIN — SODIUM CHLORIDE, POTASSIUM CHLORIDE, SODIUM LACTATE AND CALCIUM CHLORIDE: 600; 310; 30; 20 INJECTION, SOLUTION INTRAVENOUS at 08:43

## 2021-06-18 RX ADMIN — SODIUM CHLORIDE, POTASSIUM CHLORIDE, SODIUM LACTATE AND CALCIUM CHLORIDE: 600; 310; 30; 20 INJECTION, SOLUTION INTRAVENOUS at 06:28

## 2021-06-18 ASSESSMENT — PAIN SCALES - GENERAL
PAINLEVEL_OUTOF10: 0

## 2021-06-18 ASSESSMENT — PAIN - FUNCTIONAL ASSESSMENT: PAIN_FUNCTIONAL_ASSESSMENT: 0-10

## 2021-06-18 NOTE — OP NOTE
PROCEDURE NOTE    DATE OF PROCEDURE: 6/18/2021    SURGEON: Ivania Mckeon M.D.    ASSISTANT: None    PREOPERATIVE DIAGNOSIS: High risk colorectal cancer screening for adenomatous colon polyp removed 3 years ago with dysplasia in one of the polyps    POSTOPERATIVE DIAGNOSIS: Same with no recurrent colon polyps or other abnormality seen    OPERATION: Total colonoscopy     ANESTHESIA: Local monitored anesthesia. ESTIMATED BLOOD LOSS: less than 50     COMPLICATIONS: None. SPECIMENS:  Was Not Obtained    HISTORY: The patient is a 60y.o. year old male with history of above preop diagnosis. I recommended colonoscopy with possible biopsy or polypectomy and I explained the risk, benefits, expected outcome, and alternatives to the procedure. Risks included but are not limited to bleeding, infection, respiratory distress, hypotension, and perforation of the colon. The patient understands and is in agreement. PROCEDURE: The patient was given IV conscious sedation per anesthesia. The patient was given supplemental oxygen by nasal cannula. The colonoscope was inserted per rectum and advanced under direct vision to the cecum without difficulty. The prep was fair so exam was adequate. FINDINGS:  Cecum/Ascending colon: normal    Transverse colon: normal    Descending/Sigmoid colon: normal    Rectum/Anus: examined in normal and retroflexed positions and was normal 11 minutes. The patient tolerated the procedure well. ASSESSMENT/PLAN:   1. History of colon polyps with dysplasia - no recurrent polyps were seen on today's exam.  Recommend follow-up colonoscopy in 3 years.     Electronically signed by Sam Montano MD on 6/18/21 at 9:22 AM EDT

## 2021-06-18 NOTE — ANESTHESIA POSTPROCEDURE EVALUATION
Department of Anesthesiology  Postprocedure Note    Patient: Jil Knott  MRN: [id number]  YOB: 1965  Date of evaluation: 6/18/2021  Time:  9:53 AM     Procedure Summary     Date: 06/18/21 Room / Location: [facility] / [facility]    Anesthesia Start:  Anesthesia Stop:     Procedure: COLORECTAL CANCER SCREENING, HIGH RISK (N/A ) Diagnosis: (SCREENING)    Surgeons: Sam Montano MD Responsible Provider:     Anesthesia Type: MAC ASA Status: 3          Anesthesia Type: MAC    Rashmi Phase I: Rashmi Score: 10    Rashmi Phase II: Rashmi Score: 10    Last vitals: Reviewed and per EMR flowsheets.        Anesthesia Post Evaluation    Patient location during evaluation: PACU  Patient participation: complete - patient participated  Level of consciousness: awake  Pain score: 3  Airway patency: patent  Nausea & Vomiting: no nausea and no vomiting  Complications: no  Cardiovascular status: blood pressure returned to baseline  Respiratory status: acceptable  Hydration status: euvolemic

## 2021-06-18 NOTE — ANESTHESIA PRE PROCEDURE
Department of Anesthesiology  Preprocedure Note       Name:  Fritz Monteiro   Age:  60 y.o.  :  1965                                          MRN:  [id number]         Date:  2021      Surgeon: Lauri Edge):  Diamond Sol MD    Procedure: Procedure(s):  COLORECTAL CANCER SCREENING, HIGH RISK    Medications prior to admission:   Prior to Admission medications    Medication Sig Start Date End Date Taking? Authorizing Provider   labetalol (NORMODYNE) 100 MG tablet 3 times daily  21  Yes Historical Provider, MD   Sod Picosulfate-Mag Ox-Cit Acd (CLENPIQ) 10-3.5-12 MG-GM -GM/160ML SOLN Take as directed 21  Yes Diamond Sol MD   Xvkfste-Njjzi-Dfkmdvaz-TenofAF (GENVOYA) 104-060-596-10 MG TABS Take 1 tablet by mouth every evening   Yes Historical Provider, MD   VITAMIN D, ERGOCALCIFEROL, PO Take 2,000 Units by mouth daily daily 18  Yes Historical Provider, MD   amLODIPine (NORVASC) 10 MG tablet Take 10 mg by mouth every morning    Yes Historical Provider, MD   hydrALAZINE (APRESOLINE) 25 MG tablet Take 50 mg by mouth 3 times daily TAKES AT 1200 AND 2000   Yes Historical Provider, MD       Current medications:    Current Facility-Administered Medications   Medication Dose Route Frequency Provider Last Rate Last Admin    0.9 % sodium chloride infusion  25 mL Intravenous PRN Diamond Sol MD        lactated ringers infusion   Intravenous Continuous Diamond Sol MD 75 mL/hr at 21 0628 New Bag at 21 0628    sodium chloride flush 0.9 % injection 10 mL  10 mL Intravenous 2 times per day Diamond Sol MD        sodium chloride flush 0.9 % injection 10 mL  10 mL Intravenous PRN Diamond Sol MD           Allergies:     Allergies   Allergen Reactions    Penicillins Rash       Problem List:    Patient Active Problem List   Diagnosis Code    Chronic kidney disease, stage II (mild) N18.2    Hypertension I10    Human immunodeficiency virus (HIV) disease ([facility] [address] B20    Chronic hepatitis C without hepatic coma (HCC) S66.7    Umbilical hernia without obstruction and without gangrene K42.9    Heart murmur, systolic Z41.7    History of colon polyps Z86.010       Past Medical History:        Diagnosis Date    Hepatitis C     Had treatment     HIV (human immunodeficiency virus infection) ([facility] [address]     Hypertension        Past Surgical History:        Procedure Laterality Date    COLONOSCOPY N/A 2018    Distal ascending colon polyps ×2 removed with snare polypectomy ×1 and biopsy and cauterized x 1 (both Tubular Adenomas), rectal polyp removed with biopsy cauterization (squamous dysplasia), Dr. Lula Thapa, [phone] LIVER BIOPSY  2017    Chronic Hepatitis C, 1955 "[employer]    for pyloric stenosis as        Social History:    Social History     Tobacco Use    Smoking status: Former Smoker     Packs/day: 0.10     Types: Cigarettes     Start date: 6/15/1979     Quit date: 6/15/1995     Years since quittin.0    Smokeless tobacco: Never Used    Tobacco comment: social smoker   Substance Use Topics    Alcohol use: Not Currently                                Counseling given: Not Answered  Comment: social smoker      Vital Signs (Current):   Vitals:    21 0610   BP: (!) 156/95   Pulse: 80   Resp: 16   Temp: 36.6 °C (97.8 °F)   TempSrc: Temporal   SpO2: 97%   Weight: 199 lb (90.3 kg)   Height: 5' 7\" (1.702 m)                                              BP Readings from Last 3 Encounters:   21 (!) 156/95   21 (!) 141/103   20 (!) 179/98       NPO Status: Time of last liquid consumption: 030                        Time of last solid consumption:                         Date of last liquid consumption: 06/15/21                        Date of last solid food consumption: 21    BMI:   Wt Readings from Last 3 Encounters:   21 199 lb (90.3 kg)   21 199 lb (90.3 kg)   20 200 lb (90.7 kg)     Body mass index is 31.17 kg/m². CBC:   Lab Results   Component Value Date    WBC 4.7 03/03/2020    RBC 4.13 03/03/2020    HGB 13.1 03/03/2020    HCT 39.8 03/03/2020    MCV 96.4 03/03/2020    RDW 12.6 03/03/2020     03/03/2020       CMP:   Lab Results   Component Value Date     03/03/2020    K 3.8 03/03/2020     03/03/2020    CO2 22 03/03/2020    BUN 13 03/03/2020    CREATININE 1.5 03/03/2020    GFRAA 58 03/03/2020    LABGLOM 58 03/03/2020    GLUCOSE 125 03/03/2020    PROT 8.5 06/15/2018    CALCIUM 9.7 03/03/2020    BILITOT 0.3 06/15/2018    ALKPHOS 82 06/15/2018    AST 29 06/15/2018    ALT 50 06/15/2018       POC Tests: No results for input(s): POCGLU, POCNA, POCK, POCCL, POCBUN, POCHEMO, POCHCT in the last 72 hours. Coags: No results found for: PROTIME, INR, APTT    HCG (If Applicable): No results found for: PREGTESTUR, PREGSERUM, HCG, HCGQUANT     ABGs: No results found for: PHART, PO2ART, CRD4JNG, VKR2GMV, BEART, A1LCWSVD     Type & Screen (If Applicable):  No results found for: LABABO, LABRH    Drug/Infectious Status (If Applicable):  No results found for: HIV, HEPCAB    COVID-19 Screening (If Applicable): No results found for: COVID19        Anesthesia Evaluation  Patient summary reviewed and Nursing notes reviewed no history of anesthetic complications:   Airway: Mallampati: III  TM distance: >3 FB   Neck ROM: full  Mouth opening: > = 3 FB Dental:          Pulmonary:Negative Pulmonary ROS and normal exam  breath sounds clear to auscultation                             Cardiovascular:  Exercise tolerance: good (>4 METS),   (+) hypertension:,         Rhythm: regular  Rate: normal                    Neuro/Psych:   Negative Neuro/Psych ROS              GI/Hepatic/Renal:   (+) hepatitis: C, liver disease:, renal disease: CRI, bowel prep,          ROS comment: HIV. Endo/Other:                     Abdominal:           Vascular: negative vascular ROS.                                        Anesthesia Plan      MAC     ASA 3       Induction: intravenous. Anesthetic plan and risks discussed with patient. Plan discussed with attending. LYRIC Dowling - CRNA   6/18/2021      Pt seen, examined, chart reviewed, plan discussed.   Skye Perrin MD  6/18/2021  8:19 AM

## 2021-06-18 NOTE — H&P
History and Physical    Patient's Name/Date of Birth: Santy Stevens / 1965, (56 y.o.), male    Date: June 18, 2021     Assessment/Plan:  1. History of colon polyps with dysplasia - I informed the patient that I had recommended follow-up colonoscopy in 1 year however he is overdue for this. He understands. I recommended high risk screening colonoscopy with possible biopsy or polypectomy and explained the risk, benefits, expected outcome, and alternatives to the procedure. Risks included but are not limited to bleeding, infection, respiratory distress, hypotension, and perforation of the colon. The patient understands and is in agreement. 2. Small reducible asymptomatic umbilical hernia - patient was recommended to observe for any increase in size or development of any symptoms. He was to return to see me if any of these should occur. He understood. 3. HIV - patient is followed by Dr. Vinh Carter, infectious disease. Patient states that this is under control with medications. 4. Chronic kidney disease - patient is followed by Dr. Alexa Garcia and this has been stable. 5. Essential hypertension  6. Chronic hepatitis C - per patient this is stable with no problems. Chief Complaint: History of colon polyps with dysplasia    HPI:   Patient seen in the office on 4/29/2021 for the above complaint. I performed a colonoscopy on him on 12/11/2018 for low risk colorectal cancer screening. He was found to have 2 polyps in the distal ascending colon removed with snare polypectomy and retrieved. He also had a rectal polyp that was removed with biopsy and cauterization. Descending colon polyps were tubular adenomas. The rectal polyp revealed a low-grade squamous intraepithelial lesion with mild dysplasia. Due to the dysplasia he was recommended repeat colonoscopy in 1 year. However he failed to follow-up for this. Patient is recommended follow-up colonoscopy is here today for that.   He denies any change in his personal or family medical history since I last saw him. Past Medical History:   Diagnosis Date    Hepatitis C     Had treatment 2017    HIV (human immunodeficiency virus infection) ([facility] [address]     Hypertension        Past Surgical History:   Procedure Laterality Date    COLONOSCOPY N/A 2018    Distal ascending colon polyps ×2 removed with snare polypectomy ×1 and biopsy and cauterized x 1 (both Tubular Adenomas), rectal polyp removed with biopsy cauterization (squamous dysplasia), Dr. Gregg Gautam, [phone] LIVER BIOPSY  2017    Chronic Hepatitis C, 1955 [place] [facility]    for pyloric stenosis as        Current Facility-Administered Medications   Medication Dose Route Frequency Provider Last Rate Last Admin    0.9 % sodium chloride infusion  25 mL Intravenous PRN Abi Dawkins MD        lactated ringers infusion   Intravenous Continuous Abi Dawkins MD 75 mL/hr at 21 0628 New Bag at 21 0628    sodium chloride flush 0.9 % injection 10 mL  10 mL Intravenous 2 times per day Abi Dawkins MD        sodium chloride flush 0.9 % injection 10 mL  10 mL Intravenous PRN Abi Dawkins MD         Facility-Administered Medications Ordered in Other Encounters   Medication Dose Route Frequency Provider Last Rate Last Admin    lactated ringers infusion   Intravenous Continuous PRN Deyanira Martin APRN - CRNA   New Bag at 21 0843    propofol injection   Intravenous PRN LYRIC Zamudio CRNA   360 mg at 21 0900       Allergies   Allergen Reactions    Penicillins Rash       ROS:  Noncontributory    Physical Exam:  Vitals:    21 0610   BP: (!) 156/95   Pulse: 80   Resp: 16   Temp: 97.8 °F (36.6 °C)   TempSrc: Temporal   SpO2: 97%   Weight: 199 lb (90.3 kg)   Height: 5' 7\" (1.702 m)       Body mass index is 31.17 kg/m². Chest: Breath sounds were clear and equal with no rales, wheezes, or rhonchi.   Respiratory effort was normal with no retractions or use of accessory muscles. Cardiovascular: Heart sounds were normal with a regular rate and rhythm. There were no murmurs or gallops. Abdomen: Bowel sounds were normal.  The abdomen was soft and non distended. There was no tenderness, guarding, rebound, or rigidity. There was a small, reducible, umbilical hernia that is asymptomatic.       Electronically signed by Luis Julien MD on 6/18/21 at 9:19 AM EDT

## 2022-06-09 ENCOUNTER — APPOINTMENT (OUTPATIENT)
Dept: GENERAL RADIOLOGY | Age: 57
DRG: 287 | End: 2022-06-09
Payer: COMMERCIAL

## 2022-06-09 ENCOUNTER — HOSPITAL ENCOUNTER (INPATIENT)
Age: 57
LOS: 4 days | Discharge: LEFT BEFORE DISCHARGE | DRG: 287 | End: 2022-06-16
Attending: EMERGENCY MEDICINE | Admitting: INTERNAL MEDICINE
Payer: COMMERCIAL

## 2022-06-09 DIAGNOSIS — R07.9 CHEST PAIN, UNSPECIFIED TYPE: Primary | ICD-10-CM

## 2022-06-09 LAB
ALBUMIN SERPL-MCNC: 4.6 G/DL (ref 3.5–5.2)
ALP BLD-CCNC: 75 U/L (ref 40–129)
ALT SERPL-CCNC: 26 U/L (ref 0–40)
ANION GAP SERPL CALCULATED.3IONS-SCNC: 14 MMOL/L (ref 7–16)
APTT: 32.7 SEC (ref 24.5–35.1)
AST SERPL-CCNC: 25 U/L (ref 0–39)
BASOPHILS ABSOLUTE: 0.03 E9/L (ref 0–0.2)
BASOPHILS RELATIVE PERCENT: 0.6 % (ref 0–2)
BILIRUB SERPL-MCNC: 0.5 MG/DL (ref 0–1.2)
BUN BLDV-MCNC: 13 MG/DL (ref 6–20)
CALCIUM SERPL-MCNC: 9.5 MG/DL (ref 8.6–10.2)
CHLORIDE BLD-SCNC: 102 MMOL/L (ref 98–107)
CO2: 22 MMOL/L (ref 22–29)
CREAT SERPL-MCNC: 1.4 MG/DL (ref 0.7–1.2)
EOSINOPHILS ABSOLUTE: 0.04 E9/L (ref 0.05–0.5)
EOSINOPHILS RELATIVE PERCENT: 0.8 % (ref 0–6)
GFR AFRICAN AMERICAN: >60
GFR NON-AFRICAN AMERICAN: >60 ML/MIN/1.73
GLUCOSE BLD-MCNC: 122 MG/DL (ref 74–99)
HCT VFR BLD CALC: 42.8 % (ref 37–54)
HEMOGLOBIN: 14.3 G/DL (ref 12.5–16.5)
IMMATURE GRANULOCYTES #: 0.02 E9/L
IMMATURE GRANULOCYTES %: 0.4 % (ref 0–5)
INR BLD: 1.1
LYMPHOCYTES ABSOLUTE: 1.89 E9/L (ref 1.5–4)
LYMPHOCYTES RELATIVE PERCENT: 39.2 % (ref 20–42)
MCH RBC QN AUTO: 32.4 PG (ref 26–35)
MCHC RBC AUTO-ENTMCNC: 33.4 % (ref 32–34.5)
MCV RBC AUTO: 96.8 FL (ref 80–99.9)
MONOCYTES ABSOLUTE: 0.29 E9/L (ref 0.1–0.95)
MONOCYTES RELATIVE PERCENT: 6 % (ref 2–12)
NEUTROPHILS ABSOLUTE: 2.55 E9/L (ref 1.8–7.3)
NEUTROPHILS RELATIVE PERCENT: 53 % (ref 43–80)
PDW BLD-RTO: 12.8 FL (ref 11.5–15)
PLATELET # BLD: 254 E9/L (ref 130–450)
PMV BLD AUTO: 9.5 FL (ref 7–12)
POTASSIUM REFLEX MAGNESIUM: 3.8 MMOL/L (ref 3.5–5)
PROTHROMBIN TIME: 11.4 SEC (ref 9.3–12.4)
RBC # BLD: 4.42 E12/L (ref 3.8–5.8)
SODIUM BLD-SCNC: 138 MMOL/L (ref 132–146)
TOTAL PROTEIN: 8 G/DL (ref 6.4–8.3)
TROPONIN, HIGH SENSITIVITY: 12 NG/L (ref 0–11)
TROPONIN, HIGH SENSITIVITY: 13 NG/L (ref 0–11)
TROPONIN, HIGH SENSITIVITY: 14 NG/L (ref 0–11)
TROPONIN, HIGH SENSITIVITY: 15 NG/L (ref 0–11)
WBC # BLD: 4.8 E9/L (ref 4.5–11.5)

## 2022-06-09 PROCEDURE — G0378 HOSPITAL OBSERVATION PER HR: HCPCS

## 2022-06-09 PROCEDURE — 84484 ASSAY OF TROPONIN QUANT: CPT

## 2022-06-09 PROCEDURE — 2580000003 HC RX 258: Performed by: NURSE PRACTITIONER

## 2022-06-09 PROCEDURE — 85610 PROTHROMBIN TIME: CPT

## 2022-06-09 PROCEDURE — 6370000000 HC RX 637 (ALT 250 FOR IP): Performed by: INTERNAL MEDICINE

## 2022-06-09 PROCEDURE — 6360000002 HC RX W HCPCS: Performed by: NURSE PRACTITIONER

## 2022-06-09 PROCEDURE — 99285 EMERGENCY DEPT VISIT HI MDM: CPT

## 2022-06-09 PROCEDURE — 6370000000 HC RX 637 (ALT 250 FOR IP): Performed by: NURSE PRACTITIONER

## 2022-06-09 PROCEDURE — 85025 COMPLETE CBC W/AUTO DIFF WBC: CPT

## 2022-06-09 PROCEDURE — 71045 X-RAY EXAM CHEST 1 VIEW: CPT

## 2022-06-09 PROCEDURE — 6370000000 HC RX 637 (ALT 250 FOR IP): Performed by: EMERGENCY MEDICINE

## 2022-06-09 PROCEDURE — 80053 COMPREHEN METABOLIC PANEL: CPT

## 2022-06-09 PROCEDURE — 85730 THROMBOPLASTIN TIME PARTIAL: CPT

## 2022-06-09 PROCEDURE — 36415 COLL VENOUS BLD VENIPUNCTURE: CPT

## 2022-06-09 PROCEDURE — 96372 THER/PROPH/DIAG INJ SC/IM: CPT

## 2022-06-09 RX ORDER — LABETALOL 100 MG/1
100 TABLET, FILM COATED ORAL 2 TIMES DAILY
Status: DISCONTINUED | OUTPATIENT
Start: 2022-06-09 | End: 2022-06-10

## 2022-06-09 RX ORDER — SODIUM CHLORIDE 0.9 % (FLUSH) 0.9 %
5-40 SYRINGE (ML) INJECTION EVERY 12 HOURS SCHEDULED
Status: DISCONTINUED | OUTPATIENT
Start: 2022-06-09 | End: 2022-06-16 | Stop reason: HOSPADM

## 2022-06-09 RX ORDER — ACETAMINOPHEN 325 MG/1
650 TABLET ORAL EVERY 6 HOURS PRN
Status: DISCONTINUED | OUTPATIENT
Start: 2022-06-09 | End: 2022-06-13 | Stop reason: SDUPTHER

## 2022-06-09 RX ORDER — HYDRALAZINE HYDROCHLORIDE 50 MG/1
100 TABLET, FILM COATED ORAL 3 TIMES DAILY
Status: DISCONTINUED | OUTPATIENT
Start: 2022-06-09 | End: 2022-06-16 | Stop reason: HOSPADM

## 2022-06-09 RX ORDER — ENOXAPARIN SODIUM 100 MG/ML
40 INJECTION SUBCUTANEOUS DAILY
Status: DISCONTINUED | OUTPATIENT
Start: 2022-06-09 | End: 2022-06-14

## 2022-06-09 RX ORDER — ONDANSETRON 2 MG/ML
4 INJECTION INTRAMUSCULAR; INTRAVENOUS EVERY 6 HOURS PRN
Status: DISCONTINUED | OUTPATIENT
Start: 2022-06-09 | End: 2022-06-16 | Stop reason: HOSPADM

## 2022-06-09 RX ORDER — ACETAMINOPHEN 650 MG/1
650 SUPPOSITORY RECTAL EVERY 6 HOURS PRN
Status: DISCONTINUED | OUTPATIENT
Start: 2022-06-09 | End: 2022-06-13 | Stop reason: SDUPTHER

## 2022-06-09 RX ORDER — HYDROXYZINE PAMOATE 25 MG/1
25 CAPSULE ORAL 3 TIMES DAILY PRN
Status: DISCONTINUED | OUTPATIENT
Start: 2022-06-09 | End: 2022-06-16 | Stop reason: HOSPADM

## 2022-06-09 RX ORDER — AMLODIPINE BESYLATE 10 MG/1
10 TABLET ORAL EVERY MORNING
Status: DISCONTINUED | OUTPATIENT
Start: 2022-06-10 | End: 2022-06-16 | Stop reason: HOSPADM

## 2022-06-09 RX ORDER — CHOLECALCIFEROL (VITAMIN D3) 50 MCG
2000 TABLET ORAL DAILY
Status: DISCONTINUED | OUTPATIENT
Start: 2022-06-09 | End: 2022-06-16 | Stop reason: HOSPADM

## 2022-06-09 RX ORDER — ASPIRIN 81 MG/1
81 TABLET, CHEWABLE ORAL DAILY
Status: DISCONTINUED | OUTPATIENT
Start: 2022-06-10 | End: 2022-06-16 | Stop reason: HOSPADM

## 2022-06-09 RX ORDER — ATORVASTATIN CALCIUM 40 MG/1
40 TABLET, FILM COATED ORAL NIGHTLY
Status: DISCONTINUED | OUTPATIENT
Start: 2022-06-09 | End: 2022-06-16 | Stop reason: HOSPADM

## 2022-06-09 RX ORDER — POLYETHYLENE GLYCOL 3350 17 G/17G
17 POWDER, FOR SOLUTION ORAL DAILY
Status: DISCONTINUED | OUTPATIENT
Start: 2022-06-09 | End: 2022-06-16 | Stop reason: HOSPADM

## 2022-06-09 RX ORDER — SODIUM CHLORIDE 9 MG/ML
INJECTION, SOLUTION INTRAVENOUS PRN
Status: DISCONTINUED | OUTPATIENT
Start: 2022-06-09 | End: 2022-06-16 | Stop reason: HOSPADM

## 2022-06-09 RX ORDER — SODIUM CHLORIDE 0.9 % (FLUSH) 0.9 %
10 SYRINGE (ML) INJECTION PRN
Status: DISCONTINUED | OUTPATIENT
Start: 2022-06-09 | End: 2022-06-16 | Stop reason: HOSPADM

## 2022-06-09 RX ORDER — ONDANSETRON 4 MG/1
4 TABLET, ORALLY DISINTEGRATING ORAL EVERY 8 HOURS PRN
Status: DISCONTINUED | OUTPATIENT
Start: 2022-06-09 | End: 2022-06-16 | Stop reason: HOSPADM

## 2022-06-09 RX ORDER — ASPIRIN 81 MG/1
324 TABLET, CHEWABLE ORAL ONCE
Status: DISCONTINUED | OUTPATIENT
Start: 2022-06-09 | End: 2022-06-09

## 2022-06-09 RX ADMIN — Medication 2000 UNITS: at 20:48

## 2022-06-09 RX ADMIN — ENOXAPARIN SODIUM 40 MG: 100 INJECTION SUBCUTANEOUS at 20:49

## 2022-06-09 RX ADMIN — NITROGLYCERIN 0.5 INCH: 20 OINTMENT TOPICAL at 12:36

## 2022-06-09 RX ADMIN — ELVITEGRAVIR, COBICISTAT, EMTRICITABINE, AND TENOFOVIR ALAFENAMIDE 1 TABLET: 150; 150; 200; 10 TABLET ORAL at 22:08

## 2022-06-09 RX ADMIN — LABETALOL HYDROCHLORIDE 100 MG: 100 TABLET, FILM COATED ORAL at 22:08

## 2022-06-09 RX ADMIN — SODIUM CHLORIDE, PRESERVATIVE FREE 10 ML: 5 INJECTION INTRAVENOUS at 20:47

## 2022-06-09 RX ADMIN — HYDRALAZINE HYDROCHLORIDE 100 MG: 50 TABLET, FILM COATED ORAL at 20:48

## 2022-06-09 RX ADMIN — ATORVASTATIN CALCIUM 40 MG: 40 TABLET, FILM COATED ORAL at 20:48

## 2022-06-09 ASSESSMENT — PAIN DESCRIPTION - ORIENTATION: ORIENTATION: MID

## 2022-06-09 ASSESSMENT — PAIN - FUNCTIONAL ASSESSMENT: PAIN_FUNCTIONAL_ASSESSMENT: NONE - DENIES PAIN

## 2022-06-09 ASSESSMENT — PAIN DESCRIPTION - LOCATION: LOCATION: CHEST

## 2022-06-09 ASSESSMENT — PAIN SCALES - GENERAL
PAINLEVEL_OUTOF10: 0
PAINLEVEL_OUTOF10: 5

## 2022-06-09 ASSESSMENT — PAIN DESCRIPTION - DESCRIPTORS: DESCRIPTORS: TIGHTNESS

## 2022-06-09 NOTE — ED PROVIDER NOTES
HPI:  6/9/22,   Time: 11:48 AM EDT       Diana Watkins is a 60 y.o. male presenting to the ED for cp, beginning 4 hrs ago. The complaint has been persistent, moderate in severity, and worsened by nothing. Cp and sob, no hx same. Bib ems. No cough/congestion/fever/chills/sweats/nv/d. Pt states otherwise healthy. Family hx cad    Review of Systems:   Pertinent positives and negatives are stated within HPI, all other systems reviewed and are negative.          --------------------------------------------- PAST HISTORY ---------------------------------------------  Past Medical History:  has a past medical history of Hepatitis C, HIV (human immunodeficiency virus infection) ([facility] [address] and Hypertension. Past Surgical History:  has a past surgical history that includes liver biopsy (01/2017); pyloromyotomy (1963); Colonoscopy (N/A, 12/11/2018); Colonoscopy (06/18/2021); and Colonoscopy (N/A, 6/18/2021). Social History:  reports that he quit smoking about 27 years ago. His smoking use included cigarettes. He started smoking about 43 years ago. He smoked 0.10 packs per day. He has never used smokeless tobacco. He reports current alcohol use. He reports that he does not use drugs. Family History: family history includes Diabetes in his father; Heart Disease in his father; High Blood Pressure in his father. The patients home medications have been reviewed. Allergies: Penicillins        ---------------------------------------------------PHYSICAL EXAM--------------------------------------    Constitutional/General: Alert and oriented x3, well appearing, non toxic in NAD  Head: Normocephalic and atraumatic  Eyes: PERRL, EOMI, conjunctive normal, sclera non icteric  Mouth: Oropharynx clear, handling secretions,   Neck: Supple, full ROM,   Respiratory: Lungs clear to auscultation bilaterally, no wheezes, rales, or rhonchi. Not in respiratory distress  Cardiovascular:  Regular rate. Regular rhythm.  No murmurs, gallops, or rubs. 2+ distal pulses  Chest: No chest wall tenderness  GI:  Abdomen Soft, Non tender, Non distended. Musculoskeletal: Moves all extremities x 4. Warm and well perfused,  Integument: skin warm and dry. No rashes. Lymphatic: no lymphadenopathy noted  Neurologic: GCS 15, no focal deficits,   Psychiatric: Normal Affect    -------------------------------------------------- RESULTS -------------------------------------------------  I have personally reviewed all laboratory and imaging results for this patient. Results are listed below.      LABS:  Results for orders placed or performed during the hospital encounter of 06/09/22   CBC with Auto Differential   Result Value Ref Range    WBC 4.8 4.5 - 11.5 E9/L    RBC 4.42 3.80 - 5.80 E12/L    Hemoglobin 14.3 12.5 - 16.5 g/dL    Hematocrit 42.8 37.0 - 54.0 %    MCV 96.8 80.0 - 99.9 fL    MCH 32.4 26.0 - 35.0 pg    MCHC 33.4 32.0 - 34.5 %    RDW 12.8 11.5 - 15.0 fL    Platelets 199 558 - 494 E9/L    MPV 9.5 7.0 - 12.0 fL    Neutrophils % 53.0 43.0 - 80.0 %    Immature Granulocytes % 0.4 0.0 - 5.0 %    Lymphocytes % 39.2 20.0 - 42.0 %    Monocytes % 6.0 2.0 - 12.0 %    Eosinophils % 0.8 0.0 - 6.0 %    Basophils % 0.6 0.0 - 2.0 %    Neutrophils Absolute 2.55 1.80 - 7.30 E9/L    Immature Granulocytes # 0.02 E9/L    Lymphocytes Absolute 1.89 1.50 - 4.00 E9/L    Monocytes Absolute 0.29 0.10 - 0.95 E9/L    Eosinophils Absolute 0.04 (L) 0.05 - 0.50 E9/L    Basophils Absolute 0.03 0.00 - 0.20 E9/L   Comprehensive Metabolic Panel w/ Reflex to MG   Result Value Ref Range    Sodium 138 132 - 146 mmol/L    Potassium reflex Magnesium 3.8 3.5 - 5.0 mmol/L    Chloride 102 98 - 107 mmol/L    CO2 22 22 - 29 mmol/L    Anion Gap 14 7 - 16 mmol/L    Glucose 122 (H) 74 - 99 mg/dL    BUN 13 6 - 20 mg/dL    CREATININE 1.4 (H) 0.7 - 1.2 mg/dL    GFR Non-African American >60 >=60 mL/min/1.73    GFR African American >60     Calcium 9.5 8.6 - 10.2 mg/dL    Total Protein 8.0 6.4 - 8.3 g/dL    Albumin 4.6 3.5 - 5.2 g/dL    Total Bilirubin 0.5 0.0 - 1.2 mg/dL    Alkaline Phosphatase 75 40 - 129 U/L    ALT 26 0 - 40 U/L    AST 25 0 - 39 U/L   Troponin   Result Value Ref Range    Troponin, High Sensitivity 12 (H) 0 - 11 ng/L   Protime-INR   Result Value Ref Range    Protime 11.4 9.3 - 12.4 sec    INR 1.1    APTT   Result Value Ref Range    aPTT 32.7 24.5 - 35.1 sec   Troponin   Result Value Ref Range    Troponin, High Sensitivity 13 (H) 0 - 11 ng/L       RADIOLOGY:  Interpreted by Radiologist.  XR CHEST PORTABLE   Final Result   No acute process. EKG:  This EKG is signed and interpreted by the EP. Time: 1149  Rate: 100  Rhythm: Sinus  Interpretation: left bundle branch block  Comparison: changes compared to previous EKG      ------------------------- NURSING NOTES AND VITALS REVIEWED ---------------------------   The nursing notes within the ED encounter and vital signs as below have been reviewed by myself. BP (!) 143/107   Pulse 84   Temp 98.4 °F (36.9 °C) (Oral)   Resp 17   Ht 5' 7\" (1.702 m)   Wt 197 lb (89.4 kg)   SpO2 96%   BMI 30.85 kg/m²   Oxygen Saturation Interpretation: Normal    The patients available past medical records and past encounters were reviewed. ------------------------------ ED COURSE/MEDICAL DECISION MAKING----------------------  Medications   nitroglycerin (NITRO-BID) 2 % ointment 0.5 inch (0.5 inches Topical Given 6/9/22 1236)         ED COURSE:       Medical Decision Making:    Pt cp, new lbb, but doesn't meet scarbossa criteria. Cp relieved with ntg and asa, trop x 2 w/o delta, admit for further care      This patient's ED course included: a personal history and physicial examination    This patient has remained hemodynamically stable during their ED course. Re-Evaluations:             Re-evaluation.   Patients symptoms are improving    Re-examination  6/9/22   2 pM EDT            Consultations:             dane    Critical Care: Counseling: The emergency provider has spoken with the patient and discussed todays results, in addition to providing specific details for the plan of care and counseling regarding the diagnosis and prognosis. Questions are answered at this time and they are agreeable with the plan.       --------------------------------- IMPRESSION AND DISPOSITION ---------------------------------    IMPRESSION  1. Chest pain, unspecified type        DISPOSITION  Disposition: Admit to telemetry  Patient condition is stable    NOTE: This report was transcribed using voice recognition software.  Every effort was made to ensure accuracy; however, inadvertent computerized transcription errors may be present        Alon Lorenzana MD  06/09/22 2577

## 2022-06-10 ENCOUNTER — APPOINTMENT (OUTPATIENT)
Dept: ULTRASOUND IMAGING | Age: 57
DRG: 287 | End: 2022-06-10
Payer: COMMERCIAL

## 2022-06-10 LAB
ANION GAP SERPL CALCULATED.3IONS-SCNC: 17 MMOL/L (ref 7–16)
BUN BLDV-MCNC: 16 MG/DL (ref 6–20)
CALCIUM SERPL-MCNC: 9.8 MG/DL (ref 8.6–10.2)
CHLORIDE BLD-SCNC: 101 MMOL/L (ref 98–107)
CHOLESTEROL, TOTAL: 159 MG/DL (ref 0–199)
CO2: 20 MMOL/L (ref 22–29)
CREAT SERPL-MCNC: 1.7 MG/DL (ref 0.7–1.2)
GFR AFRICAN AMERICAN: 50
GFR NON-AFRICAN AMERICAN: 50 ML/MIN/1.73
GLUCOSE BLD-MCNC: 107 MG/DL (ref 74–99)
HBA1C MFR BLD: 4.8 % (ref 4–5.6)
HCT VFR BLD CALC: 40.6 % (ref 37–54)
HDLC SERPL-MCNC: 39 MG/DL
HEMOGLOBIN: 13.5 G/DL (ref 12.5–16.5)
LDL CHOLESTEROL CALCULATED: 98 MG/DL (ref 0–99)
MCH RBC QN AUTO: 32.2 PG (ref 26–35)
MCHC RBC AUTO-ENTMCNC: 33.3 % (ref 32–34.5)
MCV RBC AUTO: 96.9 FL (ref 80–99.9)
PDW BLD-RTO: 12.9 FL (ref 11.5–15)
PLATELET # BLD: 252 E9/L (ref 130–450)
PMV BLD AUTO: 10 FL (ref 7–12)
POTASSIUM REFLEX MAGNESIUM: 3.6 MMOL/L (ref 3.5–5)
RBC # BLD: 4.19 E12/L (ref 3.8–5.8)
SODIUM BLD-SCNC: 138 MMOL/L (ref 132–146)
TRIGL SERPL-MCNC: 112 MG/DL (ref 0–149)
VLDLC SERPL CALC-MCNC: 22 MG/DL
WBC # BLD: 5.4 E9/L (ref 4.5–11.5)

## 2022-06-10 PROCEDURE — 6370000000 HC RX 637 (ALT 250 FOR IP): Performed by: INTERNAL MEDICINE

## 2022-06-10 PROCEDURE — 6360000002 HC RX W HCPCS: Performed by: NURSE PRACTITIONER

## 2022-06-10 PROCEDURE — 36415 COLL VENOUS BLD VENIPUNCTURE: CPT

## 2022-06-10 PROCEDURE — 85027 COMPLETE CBC AUTOMATED: CPT

## 2022-06-10 PROCEDURE — 6370000000 HC RX 637 (ALT 250 FOR IP): Performed by: NURSE PRACTITIONER

## 2022-06-10 PROCEDURE — 97165 OT EVAL LOW COMPLEX 30 MIN: CPT

## 2022-06-10 PROCEDURE — 80061 LIPID PANEL: CPT

## 2022-06-10 PROCEDURE — 99244 OFF/OP CNSLTJ NEW/EST MOD 40: CPT | Performed by: INTERNAL MEDICINE

## 2022-06-10 PROCEDURE — G0378 HOSPITAL OBSERVATION PER HR: HCPCS

## 2022-06-10 PROCEDURE — 96372 THER/PROPH/DIAG INJ SC/IM: CPT

## 2022-06-10 PROCEDURE — 2580000003 HC RX 258: Performed by: NURSE PRACTITIONER

## 2022-06-10 PROCEDURE — 76770 US EXAM ABDO BACK WALL COMP: CPT

## 2022-06-10 PROCEDURE — 83036 HEMOGLOBIN GLYCOSYLATED A1C: CPT

## 2022-06-10 PROCEDURE — APPSS60 APP SPLIT SHARED TIME 46-60 MINUTES: Performed by: NURSE PRACTITIONER

## 2022-06-10 PROCEDURE — 97161 PT EVAL LOW COMPLEX 20 MIN: CPT

## 2022-06-10 PROCEDURE — 80048 BASIC METABOLIC PNL TOTAL CA: CPT

## 2022-06-10 RX ORDER — DOCUSATE SODIUM 100 MG/1
100 CAPSULE, LIQUID FILLED ORAL NIGHTLY
Status: DISCONTINUED | OUTPATIENT
Start: 2022-06-10 | End: 2022-06-16 | Stop reason: HOSPADM

## 2022-06-10 RX ORDER — PANTOPRAZOLE SODIUM 40 MG/1
40 TABLET, DELAYED RELEASE ORAL
Status: DISCONTINUED | OUTPATIENT
Start: 2022-06-10 | End: 2022-06-16 | Stop reason: HOSPADM

## 2022-06-10 RX ORDER — CARVEDILOL 25 MG/1
25 TABLET ORAL 2 TIMES DAILY WITH MEALS
Status: DISCONTINUED | OUTPATIENT
Start: 2022-06-10 | End: 2022-06-13

## 2022-06-10 RX ADMIN — ASPIRIN 81 MG CHEWABLE TABLET 81 MG: 81 TABLET CHEWABLE at 08:22

## 2022-06-10 RX ADMIN — Medication 2000 UNITS: at 08:22

## 2022-06-10 RX ADMIN — SODIUM CHLORIDE, PRESERVATIVE FREE 10 ML: 5 INJECTION INTRAVENOUS at 20:40

## 2022-06-10 RX ADMIN — POLYETHYLENE GLYCOL 3350 17 G: 17 POWDER, FOR SOLUTION ORAL at 08:21

## 2022-06-10 RX ADMIN — DOCUSATE SODIUM 100 MG: 100 CAPSULE, LIQUID FILLED ORAL at 20:39

## 2022-06-10 RX ADMIN — CARVEDILOL 25 MG: 25 TABLET, FILM COATED ORAL at 15:52

## 2022-06-10 RX ADMIN — ELVITEGRAVIR, COBICISTAT, EMTRICITABINE, AND TENOFOVIR ALAFENAMIDE 1 TABLET: 150; 150; 200; 10 TABLET ORAL at 20:39

## 2022-06-10 RX ADMIN — SODIUM CHLORIDE, PRESERVATIVE FREE 10 ML: 5 INJECTION INTRAVENOUS at 08:23

## 2022-06-10 RX ADMIN — ENOXAPARIN SODIUM 40 MG: 100 INJECTION SUBCUTANEOUS at 08:21

## 2022-06-10 RX ADMIN — HYDRALAZINE HYDROCHLORIDE 100 MG: 50 TABLET, FILM COATED ORAL at 08:22

## 2022-06-10 RX ADMIN — HYDRALAZINE HYDROCHLORIDE 100 MG: 50 TABLET, FILM COATED ORAL at 20:39

## 2022-06-10 RX ADMIN — PANTOPRAZOLE SODIUM 40 MG: 40 TABLET, DELAYED RELEASE ORAL at 08:28

## 2022-06-10 RX ADMIN — LABETALOL HYDROCHLORIDE 100 MG: 100 TABLET, FILM COATED ORAL at 08:22

## 2022-06-10 RX ADMIN — ATORVASTATIN CALCIUM 40 MG: 40 TABLET, FILM COATED ORAL at 20:39

## 2022-06-10 RX ADMIN — AMLODIPINE BESYLATE 10 MG: 10 TABLET ORAL at 08:22

## 2022-06-10 RX ADMIN — HYDRALAZINE HYDROCHLORIDE 100 MG: 50 TABLET, FILM COATED ORAL at 15:52

## 2022-06-10 ASSESSMENT — PAIN SCALES - GENERAL: PAINLEVEL_OUTOF10: 0

## 2022-06-10 NOTE — PROGRESS NOTES
Message left for Dr. Ruben Saul re: pt inability to participate in exercise ST. Pt states he is dizzy and sob. Will await call back.

## 2022-06-10 NOTE — PLAN OF CARE
Problem: Discharge Planning  Goal: Discharge to home or other facility with appropriate resources  6/10/2022 0916 by Sadia Garay RN  Outcome: Progressing  6/10/2022 0914 by Sadia Garay RN  Outcome: Progressing  Flowsheets (Taken 6/9/2022 2059 by Ronal Beach RN)  Discharge to home or other facility with appropriate resources:   Identify barriers to discharge with patient and caregiver   Arrange for needed discharge resources and transportation as appropriate   Identify discharge learning needs (meds, wound care, etc)   Refer to discharge planning if patient needs post-hospital services based on physician order or complex needs related to functional status, cognitive ability or social support system     Problem: Pain  Goal: Verbalizes/displays adequate comfort level or baseline comfort level  6/10/2022 0916 by Sadia Garay RN  Outcome: Progressing  6/10/2022 0914 by Sadia Garay RN  Outcome: Progressing  Flowsheets (Taken 6/10/2022 0800)  Verbalizes/displays adequate comfort level or baseline comfort level:   Encourage patient to monitor pain and request assistance   Assess pain using appropriate pain scale   Administer analgesics based on type and severity of pain and evaluate response   Implement non-pharmacological measures as appropriate and evaluate response  6/10/2022 0108 by Ronal Beach RN  Outcome: Progressing     Problem: Safety - Adult  Goal: Free from fall injury  6/10/2022 0916 by Sadia Garay RN  Outcome: Progressing  6/10/2022 0914 by Sadia Garay RN  Outcome: Progressing  6/10/2022 0108 by Ronal Beach RN  Outcome: Progressing     Problem: ABCDS Injury Assessment  Goal: Absence of physical injury  6/10/2022 0916 by Sadia Garay RN  Outcome: Progressing  6/10/2022 0914 by Sadia Garay RN  Outcome: Progressing  Flowsheets (Taken 6/10/2022 0913)  Absence of Physical Injury: Implement safety measures based on patient assessment

## 2022-06-10 NOTE — CARE COORDINATION
Transition of care: Cardio consulted. Met with pt in room. Pt lives alone in the 1st floor of a duplex home. Has 3 stairs with no handrail to enter home. Independent with ADLs and drives. Not a . DME- bp cuff/monitor. Plan is to return home with no needs. PCP is Dr Vance Espinoza and pharmacy is [facility] on [address] [place] [address]. Sw/cm will follow.

## 2022-06-10 NOTE — PROGRESS NOTES
Messaged Mavis Carson due to pt having treadmill stress test and possibly not being able to tolerate procedure dut to pt dizzy with increased CP sitting EOB. Per Sulma Irizarry she would discuss stress test needs with Hong Mert in AM. Pt also requesting anxiety medications PRN. Per Sulma Irizarry pt is to have Vistaril 25 mg p.o. TID PRN for anxiety. Pt also needed Genvoya medication.  Per Sulma Irizarry pt is to have 10 mg p.o.nightly

## 2022-06-10 NOTE — PROGRESS NOTES
Physical Therapy  Initial Assessment       Name: Tracey Cleaning  : 1965  MRN: [id number]    Date of Service: 6/10/2022    Evaluating PT:  Phoenix Lozano PT, DPT  [id number]    Room #:  4188/2249-U  Diagnosis:  Chest pain [R07.9]  Chest pain, unspecified type [R07.9]  PMHx/PSHx:  Hep C, HIV, HTN    Procedure/Surgery:    Precautions:    Equipment Needs:  None    SUBJECTIVE:    Pt lives with alone in a 1 story home with 2 stairs to enter and no rail. Bed is on first floor and bath is on first floor. Pt ambulated with no device independently PTA. Equipment Owned: TBD    OBJECTIVE:   Initial Evaluation  Date: 6/10/22 Treatment Short Term/ Long Term   Goals   AM-PAC 6 Clicks 41/59     Was pt agreeable to Eval/treatment? Yes      Does pt have pain? No c/o pain     Bed Mobility  Rolling: Independent  Supine to sit: Independent  Sit to supine: Independent  Scooting: Independent       Transfers Sit to stand: Independent  Stand to sit: Independent  Stand pivot: Independent       Ambulation    300 feet with Independent       Stair negotiation: ascended and descended  4 steps with no rail Independent       ROM BLE:  WFL     Strength   BLE:  5/5     Balance Sitting EOB:  Independent    Dynamic Standing:  Independent           Pt is A & O x 4  Sensation:  WNL  Edema: WNL    Vitals:  HR 80 spo2 98% with activity    Patient education  Pt educated on role of PT    Patient response to education:   Pt verbalized understanding Pt demonstrated skill Pt requires further education in this area   x x      ASSESSMENT:    Conditions Requiring Skilled Therapeutic Intervention:    []Decreased strength     []Decreased ROM  []Decreased functional mobility  []Decreased balance   [x]Decreased endurance   []Decreased posture  []Decreased sensation  []Decreased coordination   []Decreased vision  []Decreased safety awareness   []Increased pain       Comments:  Pt agreeable to PT evaluation. Pt performing all mobility independently.  Pt with steady gait. Pt educated on pacing and pursed lip breathing. Will discontinue PT. Please re order if functional status were to change. Treatment:  Patient practiced and was instructed in the following treatment:        Pt's/ family goals   1. Get better    Prognosis is good for reaching above PT goals. Patient and or family understand(s) diagnosis, prognosis, and plan of care.   yes    PHYSICAL THERAPY PLAN OF CARE:    PT POC is established based on physician order and patient diagnosis     Referring provider/PT Order:    06/10/22 0800  PT eval and treat  Start:  06/10/22 0800,   End:  06/10/22 0800,   ONE TIME,   Standing Count:  1 Occurrences,   R         Jeremy Arya Arreola, DO       Diagnosis:  Chest pain [R07.9]  Chest pain, unspecified type [R07.9]  Specific instructions for next treatment:      Current Treatment Recommendations:     [] Strengthening to improve independence with functional mobility   [] ROM to improve independence with functional mobility   [] Balance Training to improve static/dynamic balance and to reduce fall risk  [] Endurance Training to improve activity tolerance during functional mobility   [] Transfer Training to improve safety and independence with all functional transfers   [] Gait Training to improve gait mechanics, endurance and assess need for appropriate assistive device  [] Stair Training in preparation for safe discharge home and/or into the community   [] Positioning to prevent skin breakdown and contractures  [] Safety and Education Training   [] Patient/Caregiver Education   [] HEP  [] Other     PT long term treatment goals are located in above grid     Discontinue PT     Time in  0950  Time out  1000    Total Treatment Time  0 minutes     Evaluation Time includes thorough review of current medical information, gathering information on past medical history/social history and prior level of function, completion of standardized testing/informal observation of tasks, assessment of data and education on plan of care and goals.     CPT codes:  [x] Low Complexity PT evaluation 60426  [] Moderate Complexity PT evaluation 12776  [] High Complexity PT evaluation 31485  [] PT Re-evaluation 80530  [] Gait training 01633 0 minutes  [] Manual therapy 86531 0 minutes  [] Therapeutic activities 55196 0 minutes  [] Therapeutic exercises 45522 0 minutes  [] Neuromuscular reeducation 93034 0 minutes       Nisha Lau PT, DPT   [id number]

## 2022-06-10 NOTE — CONSULTS
Inpatient Cardiology Consultation      Reason for Consult:  Chest pain     Consulting Physician: Dr. Sandy Sandoval    Requesting Physician:  Dr. Sharonda Reynolds    Date of Consultation: 6/10/2022    HISTORY OF PRESENT ILLNESS:   Mr. Hernandez is a 58-year-old obese AA male who is new to [facility] cardiology physicians. PMH: CKD stage II, HTN, HIV, history of Chronic Hepatitis C (treated at [facility] 2017). [facility] on 6/9/2022 with complaints of chest pain that started 4 hours prior to admission with accompanied shortness of breath. Patient states that chest pain started after he drank a large coffee with caffeine. Pain was in the epigastric and lower mid-sternal region, that felt like a squeezing tightness that was accompanied by SOB and dizziness/lightheadedness. Nothing aggravated or relieved the pain. Patient states that he usually experiences this chest tightness, SOB and dizziness/lightheadedness when he is shoveling the snow or doing something that requires physical exertion x 1 year ago. Denies any recent viral infection, fever, chills, nausea, vomiting. Upon arrival to the ED: Blood pressure 154/103, heart rate 104, afebrile, 96% on room air. Labs: Sodium 138, potassium 3.8, BUN 13, creatinine 1.4>> 1.7 (today)  High-sensitivity troponin 12, 13, 14, 15. Cholesterol panel: Total cholesterol 159, HDL 39, LDL 98, triglycerides 112. WBC 4.8, H/H stable, platelet count 449. Hemoglobin A1c 4.8%. CXR: No acute process. EKG:  ER medications: Nitroglycerin paste 0.5 inch. Patient was admitted to a telemetry monitoring unit for further evaluation and management. Please note: past medical records were reviewed per electronic medical record (EMR) - see detailed reports under Past Medical/ Surgical History. Past Medical History:    1. Hypertension (follows with [facility]). 2. Hepatitis C-treated 2017  3. HIV (+ noted on lab work from [facility] 3/21/2017).   4. No prior LHC, stress test or echocardiogram.  5. Obesity: BMI 30.79 kg/m2  6. LBBB (noted on EKG 2022)     Past Surgical History:    Past Surgical History:   Procedure Laterality Date    COLONOSCOPY N/A 2018    Distal ascending colon polyps ×2 removed with snare polypectomy ×1 and biopsy and cauterized x 1 (both Tubular Adenomas), rectal polyp removed with biopsy cauterization (squamous dysplasia), Dr. Stack Client, [address] COLONOSCOPY  2021    Normal colon, Dr. Stack Client, [address] COLONOSCOPY N/A 2021    COLORECTAL CANCER SCREENING, HIGH RISK performed by Devon Alegria MD at [place] [address]  2017    Chronic Hepatitis C,  [facility]    for pyloric stenosis as        Medications Prior to admit:  Prior to Admission medications    Medication Sig Start Date End Date Taking?  Authorizing Provider   labetalol (NORMODYNE) 100 MG tablet Take 100 mg by mouth 2 times daily  21   Historical Provider, MD CarreonTyzebzz-Ghlui-Anxiykea-TenofAF (GENVOYA) [phone] MG TABS Take 1 tablet by mouth every evening    Historical Provider, MD   Vitamin D, Ergocalciferol, 50 MCG (2000 UT) CAPS Take 2,000 Units by mouth daily daily 18   Historical Provider, MD   amLODIPine (NORVASC) 10 MG tablet Take 10 mg by mouth every morning     Historical Provider, MD   hydrALAZINE (APRESOLINE) 100 MG tablet Take 100 mg by mouth 3 times daily     Historical Provider, MD       Current Medications:    Current Facility-Administered Medications: docusate sodium (COLACE) capsule 100 mg, 100 mg, Oral, Nightly  pantoprazole (PROTONIX) tablet 40 mg, 40 mg, Oral, QAM AC  amLODIPine (NORVASC) tablet 10 mg, 10 mg, Oral, QAM  hydrALAZINE (APRESOLINE) tablet 100 mg, 100 mg, Oral, TID  labetalol (NORMODYNE) tablet 100 mg, 100 mg, Oral, BID  Vitamin D (CHOLECALCIFEROL) tablet 2,000 Units, 2,000 Units, Oral, Daily  sodium chloride flush 0.9 % injection 5-40 mL, 5-40 mL, IntraVENous, 2 times per day  sodium chloride flush 0.9 % injection 10 mL, 10 mL, IntraVENous, PRN  0.9 % sodium chloride infusion, , IntraVENous, PRN  ondansetron (ZOFRAN-ODT) disintegrating tablet 4 mg, 4 mg, Oral, Q8H PRN **OR** ondansetron (ZOFRAN) injection 4 mg, 4 mg, IntraVENous, Q6H PRN  acetaminophen (TYLENOL) tablet 650 mg, 650 mg, Oral, Q6H PRN **OR** acetaminophen (TYLENOL) suppository 650 mg, 650 mg, Rectal, Q6H PRN  magnesium hydroxide (MILK OF MAGNESIA) 400 MG/5ML suspension 30 mL, 30 mL, Oral, Daily PRN  aspirin chewable tablet 81 mg, 81 mg, Oral, Daily  enoxaparin (LOVENOX) injection 40 mg, 40 mg, SubCUTAneous, Daily  atorvastatin (LIPITOR) tablet 40 mg, 40 mg, Oral, Nightly  polyethylene glycol (GLYCOLAX) packet 17 g, 17 g, Oral, Daily  hydrOXYzine pamoate (VISTARIL) capsule 25 mg, 25 mg, Oral, TID PRN  elvitegravir-cobicistat-emtricitabine-tenofovir alafenamide (GENVOYA) [phone] MG TABLET 1 tablet, 1 tablet, Oral, Nightly    Allergies:  Penicillins (rash)    Social History: Former smoker: Quit 1995. Denies illicit drug use. Alcohol use is usually 1 to 2 beers a week or every other week. Family History:   Father: Hypertension, diabetes mellitus and \"heart disease\", had CABG in his 76s  Mother: Healthy  Brother: HTN   Sister: HTN    REVIEW OF SYSTEMS:     · Constitutional: Denies fatigue, fevers, chills or night sweats  · Eyes: Denies visual changes or drainage  · ENT: Denies headaches or hearing loss. No mouth sores or sore throat. No epistaxis   · Cardiovascular: + chest pain. Denies pressure or palpitations. No lower extremity swelling. · Respiratory: + ARREDONDO. Denies cough, orthopnea or PND. No hemoptysis   · Gastrointestinal: Denies hematemesis or anorexia. No hematochezia or melena    · Genitourinary: Denies urgency, dysuria or hematuria. · Musculoskeletal: Denies gait disturbance, weakness or joint complaints  · Integumentary: Denies rash, hives or pruritis   · Neurological: Denies dizziness, headaches or seizures.  No numbness or tingling  · Psychiatric: Denies anxiety or depression. · Endocrine: Denies temperature intolerance. No recent weight change. .  · Hematologic/Lymphatic: Denies abnormal bruising or bleeding. No swollen lymph nodes    PHYSICAL EXAM:   BP (!) 134/96   Pulse 75   Temp 97.3 °F (36.3 °C) (Temporal)   Resp 18   Ht 5' 7\" (1.702 m)   Wt 196 lb 9.6 oz (89.2 kg)   SpO2 98%   BMI 30.79 kg/m²   CONST:  Well developed, well nourished middle aged AA male who appears of stated age. Awake, alert and cooperative. No apparent distress. HEENT:   Head- Normocephalic, atraumatic   Eyes- Conjunctivae pink, anicteric  Throat- Oral mucosa pink and moist  Neck-  No stridor, trachea midline, no jugular venous distention. No carotid bruit. CHEST: Chest symmetrical and non-tender to palpation. No accessory muscle use or intercostal retractions  RESPIRATORY: Lung sounds - clear throughout fields. On RA. CARDIOVASCULAR:     Heart Inspection- shows no noted pulsations  Heart Palpation- no heaves or thrills; PMI is non-displaced   Heart Ausculation- Regular rate and rhythm, no murmur. No s3, s4 or rub   PV: No lower extremity edema. No varicosities. Pedal pulses palpable, no clubbing or cyanosis   ABDOMEN: + Old scar over right side of abdomen from previous surgery. Soft, non-tender to light palpation. Bowel sounds present. No palpable masses no organomegaly; no abdominal bruit  MS: Good muscle strength and tone. No atrophy or abnormal movements. : Deferred  SKIN: Warm and dry no statis dermatitis or ulcers   NEURO / PSYCH: Oriented to person, place and time. Speech clear and appropriate. Follows all commands. Pleasant affect     DATA:    EC2022 SR with 1st degree AV block, LBBB, rate 98 bpm.   Telemetry strips: SR with first degree AV block and IVCD.    Diagnostic:      Intake/Output Summary (Last 24 hours) at 6/10/2022 1038  Last data filed at 2022 2317  Gross per 24 hour   Intake --   Output 575 ml   Net -575 ml       Labs:   CBC:   Recent Labs     06/09/22  1221 06/10/22  0634   WBC 4.8 5.4   HGB 14.3 13.5   HCT 42.8 40.6    252     BMP:   Recent Labs     06/09/22  1221 06/10/22  0635    138   K 3.8 3.6   CO2 22 20*   BUN 13 16   CREATININE 1.4* 1.7*   LABGLOM >60 50   CALCIUM 9.5 9.8     Mag: No results for input(s): MG in the last 72 hours. Phos: No results for input(s): PHOS in the last 72 hours. TFT: No results found for: TSH, F4FLJEO, D0VWSLB, THYROIDAB, FT3, T4FREE   HgA1c:   Lab Results   Component Value Date    LABA1C 4.8 06/10/2022     No results found for: EAG  proBNP: No results for input(s): PROBNP in the last 72 hours. PT/INR:   Recent Labs     06/09/22  1221   PROTIME 11.4   INR 1.1     APTT:  Recent Labs     06/09/22  1221   APTT 32.7     CARDIAC ENZYMES:  Recent Labs     06/09/22  1221 06/09/22  1356 06/09/22  2026 06/09/22  2314   TROPHS 12* 13* 14* 15*     FASTING LIPID PANEL:  Lab Results   Component Value Date    CHOL 159 06/10/2022    HDL 39 06/10/2022    LDLCALC 98 06/10/2022    TRIG 112 06/10/2022     LIVER PROFILE:  Recent Labs     06/09/22  1221   AST 25   ALT 26   LABALBU 4.6       CXR: 6/9/2022 no acute process. Assessment:  1. Chest pain with typical features in the setting of hypertensive emergency. 2. Borderline elevated hs-cTnT (12, 13, 14, 15): Pattern not consistent with ACS. No acute EKG changes. Currently chest pain-free. 3. Hypertensive emergency. Hx of HTN   4. New LBBB  5. CKD: SCr 1.4>>1.7. (Recent baseline unknown). SCr 1.5 (2020). 6. Hepatitis C (treated, [facility] 2017). 7. HIV  8. ASCVD risk score 16.3%. 9. Obesity: BMI 30.79 kg/m2      Plan:  1. Lexiscan MPS to assess for ischemia. 2. Agree with starting Lipitor 40 mg daily. 3. ECHO to assess LV function   4. Change Labetalol to Coreg   5. Continue current cardiac medications   6. Discussed risk factor modifications and alcohol cessation with patient. 7. Check Renal US  8.  Will follow     The above assessment and plan to be discussed with Dr. Fouzia Rehman. Electronically signed by LYRIC Rodriguez CNP on 6/10/22 at 10:41 AM EDT    Patient seen and examined and case discussed in detail with cardiology nurse practitioner and agree with assessment and plan and history physical examination discussed in detail and documented above.  I also independently examined the patient, reviewed the patient chart, medication, blood work and imaging and contributed more than 50% of the patient care.

## 2022-06-10 NOTE — PLAN OF CARE
Problem: Discharge Planning  Goal: Discharge to home or other facility with appropriate resources  Outcome: Progressing  Flowsheets (Taken 6/9/2022 2059 by Gabbie Del Angel RN)  Discharge to home or other facility with appropriate resources:   Identify barriers to discharge with patient and caregiver   Arrange for needed discharge resources and transportation as appropriate   Identify discharge learning needs (meds, wound care, etc)   Refer to discharge planning if patient needs post-hospital services based on physician order or complex needs related to functional status, cognitive ability or social support system     Problem: Pain  Goal: Verbalizes/displays adequate comfort level or baseline comfort level  6/10/2022 0914 by Jamel Gold RN  Outcome: Progressing  Flowsheets (Taken 6/10/2022 0800)  Verbalizes/displays adequate comfort level or baseline comfort level:   Encourage patient to monitor pain and request assistance   Assess pain using appropriate pain scale   Administer analgesics based on type and severity of pain and evaluate response   Implement non-pharmacological measures as appropriate and evaluate response  6/10/2022 0108 by Gabbie Del Angel RN  Outcome: Progressing     Problem: Safety - Adult  Goal: Free from fall injury  6/10/2022 0914 by Jamel Gold RN  Outcome: Progressing  6/10/2022 0108 by Gabbie Del Angel RN  Outcome: Progressing     Problem: ABCDS Injury Assessment  Goal: Absence of physical injury  Outcome: Progressing  Flowsheets (Taken 6/10/2022 0913)  Absence of Physical Injury: Implement safety measures based on patient assessment

## 2022-06-10 NOTE — PATIENT CARE CONFERENCE
P Quality Flow/Interdisciplinary Rounds Progress Note        Quality Flow Rounds held on Bhavna 10, 2022    Disciplines Attending:  Bedside Nurse, ,  and Nursing Unit 61 Desiree Drummond was admitted on 6/9/2022 11:53 AM    Anticipated Discharge Date:  Expected Discharge Date: 06/11/22    Disposition:    Sumit Score:  Sumit Scale Score: 22    Readmission Risk              Risk of Unplanned Readmission:  0           Discussed patient goal for the day, patient clinical progression, and barriers to discharge.   The following Goal(s) of the Day/Commitment(s) have been identified:  have cardiology see on consult      Deyanira Rene RN  Bhavna 10, 2022

## 2022-06-10 NOTE — PROGRESS NOTES
Bathing Independent   Toileting Independent    Bed Mobility  Supine to sit: Independent   Sit to supine: NT   Functional Transfers Sit to stand: Independent   Stand to sit: Independent   Stand pivot: NT    From EOB/Commode   Functional Mobility Indep with no AD  For household distances. Balance Sitting:     Static:  wfl    Dynamic:wfl  Standing: wfl   Activity Tolerance wfl   Visual/  Perceptual Glasses: Yes; driving/reading            Hand dominance: L  UE ROM: RUE: WFL  LUE: WFL  Strength: RUE: grossly 5/5 LUE: grossly 5/5   Strength:BUE WFL  Fine Motor Coordination: BUE WFL    Hearing: WFL  Sensation:  No c/o numbness or tingling  Tone:  WFL  Edema: None noted                            Comments/Treatment: Upon arrival, patient supine in bed. Pt demonstrating independence with ADLs/mobility and good understanding of education and safety techniques. At end of session, patient seated at EOB, with call light and phone within reach, all lines and tubes intact. Recommend d/c from OT d/t no acute skilled needs at this time. Evaluation time includes thorough review of current medical information, gathering information on past medical & social history & PLOF, completion of standardized testing, informal observation of tasks, consultation with other medical professions/disciplines, assessment of data & development of POC/goals.      Evaluation Complexity: Low    Time In: 9:45a  Time Out: 9:56a      Min Units   OT Eval Low 97165  x  1   OT Eval Medium 08541      OT Eval High [id number]       OT Re-Eval [id number]       Therapeutic Ex 68446       Therapeutic Activities 42979       ADL/Self Care 97013       Orthotic Management 34014       Neuro Re-Ed 39683       Non-Billable Time           Bret Rod [id number] [facility] Stacie OTR/L, [id number]

## 2022-06-10 NOTE — H&P
Hospital Medicine History & Physical      PCP: Destin Stephens MD    Date of Admission: 2022    Date of Service: . Bhavna 10, 2022  Chief Complaint:  * CHEST PAIN      History Of Present Illness:     60 y.o. male presented with CHEST PAIN, ONSET SEVERAL YEARS  AGO, TIGHTNESS IN CHARACTER, NO NV, SOB OR DIAPHORESIS, POS PALPITATIONS  Past Medical History:          Diagnosis Date    Hepatitis C     Had treatment 2017    HIV (human immunodeficiency virus infection) ([facility] [address]     Hypertension        Past Surgical History:          Procedure Laterality Date    COLONOSCOPY N/A 2018    Distal ascending colon polyps ×2 removed with snare polypectomy ×1 and biopsy and cauterized x 1 (both Tubular Adenomas), rectal polyp removed with biopsy cauterization (squamous dysplasia), Dr. Thaddeus Betancourt, [address] COLONOSCOPY  2021    Normal colon, Dr. Thaddeus Betancourt, [address] COLONOSCOPY N/A 2021    COLORECTAL CANCER SCREENING, HIGH RISK performed by Donavan June MD at [place] [address]  2017    Chronic Hepatitis C,  [facility] [facility]    for pyloric stenosis as        Medications Prior to Admission:      Prior to Admission medications    Medication Sig Start Date End Date Taking?  Authorizing Provider   labetalol (NORMODYNE) 100 MG tablet Take 100 mg by mouth 2 times daily  21   Historical Provider, MD CarreonQgsxbfy-Ngbov-Vjwcydeb-TenofAF (GENVOYA) [phone] MG TABS Take 1 tablet by mouth every evening    Historical Provider, MD   Vitamin D, Ergocalciferol, 50 MCG ( UT) CAPS Take 2,000 Units by mouth daily daily 18   Historical Provider, MD   amLODIPine (NORVASC) 10 MG tablet Take 10 mg by mouth every morning     Historical Provider, MD   hydrALAZINE (APRESOLINE) 100 MG tablet Take 100 mg by mouth 3 times daily     Historical Provider, MD CO2 22 20*   BUN 13 16   CREATININE 1.4* 1.7*   CALCIUM 9.5 9.8     Recent Labs     06/09/22  1221   AST 25   ALT 26   BILITOT 0.5   ALKPHOS 75     Recent Labs     06/09/22  1221   INR 1.1     No results for input(s): Lorenso Favre in the last 72 hours. Urinalysis:    No results found for: [place], BACTERIA, RBCUA, BLOODU, [address], [address]    Radiology:     CXR: I have reviewed the CXR with the following interpretation: *    US RETROPERITONEAL COMPLETE   Final Result   1. Right mid to upper pole 19 mm simple appearing cyst.  Otherwise normal   appearance of the kidneys. No hydronephrosis. 2.  Normal appearance of the imaged bladder. XR CHEST PORTABLE   Final Result   No acute process. NM Cardiac Stress Test Nuclear Imaging    (Results Pending)       ASSESSMENT:    Active Hospital Problems    Diagnosis Date Noted    Chest pain [R07.9] 06/09/2022     Priority: Medium   HEPATITIS C   HIV   HTN   CKD 2      PLAN:  CARD CONSULT   COREG   LIPITOR   NORVASC   HYDRALAZINE   GENVOYA  ASPIRIN      DVT Prophylaxis: *LOVENOX  Diet: ADULT DIET; Regular; Low Sodium (2 gm)  Diet NPO Exceptions are: Sips of Water with Meds  Code Status: Full Code    PT/OT Eval Status: *ORDERED    Dispo - *HOME    Electronically signed by Tad Johansen DO on 6/10/2022 at 6:02 PM [email or web address]       Thank you Karissa Brannon MD for the opportunity to be involved in this patient's care.  If you have any questions or concerns please feel free to contact me at [phone]

## 2022-06-11 ENCOUNTER — APPOINTMENT (OUTPATIENT)
Dept: NUCLEAR MEDICINE | Age: 57
DRG: 287 | End: 2022-06-11
Payer: COMMERCIAL

## 2022-06-11 LAB
ANION GAP SERPL CALCULATED.3IONS-SCNC: 17 MMOL/L (ref 7–16)
BUN BLDV-MCNC: 22 MG/DL (ref 6–20)
CALCIUM SERPL-MCNC: 9.8 MG/DL (ref 8.6–10.2)
CHLORIDE BLD-SCNC: 101 MMOL/L (ref 98–107)
CO2: 19 MMOL/L (ref 22–29)
CREAT SERPL-MCNC: 2.2 MG/DL (ref 0.7–1.2)
GFR AFRICAN AMERICAN: 37
GFR NON-AFRICAN AMERICAN: 37 ML/MIN/1.73
GLUCOSE BLD-MCNC: 109 MG/DL (ref 74–99)
LV EF: 58 %
LV EF: 60 %
LVEF MODALITY: NORMAL
LVEF MODALITY: NORMAL
POTASSIUM SERPL-SCNC: 3.7 MMOL/L (ref 3.5–5)
SODIUM BLD-SCNC: 137 MMOL/L (ref 132–146)

## 2022-06-11 PROCEDURE — 2580000003 HC RX 258: Performed by: NURSE PRACTITIONER

## 2022-06-11 PROCEDURE — 6370000000 HC RX 637 (ALT 250 FOR IP): Performed by: INTERNAL MEDICINE

## 2022-06-11 PROCEDURE — 78452 HT MUSCLE IMAGE SPECT MULT: CPT | Performed by: INTERNAL MEDICINE

## 2022-06-11 PROCEDURE — 78452 HT MUSCLE IMAGE SPECT MULT: CPT

## 2022-06-11 PROCEDURE — 3430000000 HC RX DIAGNOSTIC RADIOPHARMACEUTICAL: Performed by: RADIOLOGY

## 2022-06-11 PROCEDURE — 6360000002 HC RX W HCPCS: Performed by: NURSE PRACTITIONER

## 2022-06-11 PROCEDURE — 93018 CV STRESS TEST I&R ONLY: CPT | Performed by: INTERNAL MEDICINE

## 2022-06-11 PROCEDURE — G0378 HOSPITAL OBSERVATION PER HR: HCPCS

## 2022-06-11 PROCEDURE — 93017 CV STRESS TEST TRACING ONLY: CPT

## 2022-06-11 PROCEDURE — 6370000000 HC RX 637 (ALT 250 FOR IP): Performed by: NURSE PRACTITIONER

## 2022-06-11 PROCEDURE — 96372 THER/PROPH/DIAG INJ SC/IM: CPT

## 2022-06-11 PROCEDURE — 2580000003 HC RX 258: Performed by: INTERNAL MEDICINE

## 2022-06-11 PROCEDURE — 93016 CV STRESS TEST SUPVJ ONLY: CPT | Performed by: INTERNAL MEDICINE

## 2022-06-11 PROCEDURE — 93306 TTE W/DOPPLER COMPLETE: CPT

## 2022-06-11 PROCEDURE — 80048 BASIC METABOLIC PNL TOTAL CA: CPT

## 2022-06-11 PROCEDURE — 2580000003 HC RX 258: Performed by: PHYSICIAN ASSISTANT

## 2022-06-11 PROCEDURE — 36415 COLL VENOUS BLD VENIPUNCTURE: CPT

## 2022-06-11 PROCEDURE — 99233 SBSQ HOSP IP/OBS HIGH 50: CPT | Performed by: INTERNAL MEDICINE

## 2022-06-11 PROCEDURE — A9500 TC99M SESTAMIBI: HCPCS | Performed by: RADIOLOGY

## 2022-06-11 RX ORDER — SODIUM CHLORIDE 9 MG/ML
INJECTION, SOLUTION INTRAVENOUS CONTINUOUS
Status: DISCONTINUED | OUTPATIENT
Start: 2022-06-11 | End: 2022-06-14

## 2022-06-11 RX ORDER — ISOSORBIDE DINITRATE 10 MG/1
10 TABLET ORAL 3 TIMES DAILY
Status: DISCONTINUED | OUTPATIENT
Start: 2022-06-11 | End: 2022-06-12

## 2022-06-11 RX ORDER — 0.9 % SODIUM CHLORIDE 0.9 %
500 INTRAVENOUS SOLUTION INTRAVENOUS ONCE
Status: COMPLETED | OUTPATIENT
Start: 2022-06-11 | End: 2022-06-11

## 2022-06-11 RX ADMIN — ISOSORBIDE DINITRATE 10 MG: 10 TABLET ORAL at 12:11

## 2022-06-11 RX ADMIN — Medication 11.5 MILLICURIE: at 08:20

## 2022-06-11 RX ADMIN — HYDRALAZINE HYDROCHLORIDE 100 MG: 50 TABLET, FILM COATED ORAL at 08:08

## 2022-06-11 RX ADMIN — SODIUM CHLORIDE, PRESERVATIVE FREE 10 ML: 5 INJECTION INTRAVENOUS at 08:08

## 2022-06-11 RX ADMIN — SODIUM CHLORIDE 500 ML: 9 INJECTION, SOLUTION INTRAVENOUS at 13:08

## 2022-06-11 RX ADMIN — SODIUM CHLORIDE: 9 INJECTION, SOLUTION INTRAVENOUS at 12:14

## 2022-06-11 RX ADMIN — REGADENOSON 0.4 MG: 0.08 INJECTION, SOLUTION INTRAVENOUS at 09:38

## 2022-06-11 RX ADMIN — HYDRALAZINE HYDROCHLORIDE 100 MG: 50 TABLET, FILM COATED ORAL at 21:47

## 2022-06-11 RX ADMIN — Medication 38 MILLICURIE: at 09:40

## 2022-06-11 RX ADMIN — POLYETHYLENE GLYCOL 3350 17 G: 17 POWDER, FOR SOLUTION ORAL at 12:11

## 2022-06-11 RX ADMIN — ASPIRIN 81 MG CHEWABLE TABLET 81 MG: 81 TABLET CHEWABLE at 08:08

## 2022-06-11 RX ADMIN — AMLODIPINE BESYLATE 10 MG: 10 TABLET ORAL at 08:08

## 2022-06-11 RX ADMIN — PANTOPRAZOLE SODIUM 40 MG: 40 TABLET, DELAYED RELEASE ORAL at 06:13

## 2022-06-11 RX ADMIN — SODIUM CHLORIDE, PRESERVATIVE FREE 10 ML: 5 INJECTION INTRAVENOUS at 21:47

## 2022-06-11 RX ADMIN — CARVEDILOL 25 MG: 25 TABLET, FILM COATED ORAL at 17:11

## 2022-06-11 RX ADMIN — ELVITEGRAVIR, COBICISTAT, EMTRICITABINE, AND TENOFOVIR ALAFENAMIDE 1 TABLET: 150; 150; 200; 10 TABLET ORAL at 21:47

## 2022-06-11 RX ADMIN — CARVEDILOL 25 MG: 25 TABLET, FILM COATED ORAL at 08:08

## 2022-06-11 RX ADMIN — ATORVASTATIN CALCIUM 40 MG: 40 TABLET, FILM COATED ORAL at 21:47

## 2022-06-11 RX ADMIN — ENOXAPARIN SODIUM 40 MG: 100 INJECTION SUBCUTANEOUS at 08:08

## 2022-06-11 RX ADMIN — DOCUSATE SODIUM 100 MG: 100 CAPSULE, LIQUID FILLED ORAL at 21:47

## 2022-06-11 RX ADMIN — SODIUM CHLORIDE: 9 INJECTION, SOLUTION INTRAVENOUS at 21:53

## 2022-06-11 RX ADMIN — Medication 2000 UNITS: at 08:08

## 2022-06-11 NOTE — PLAN OF CARE
Problem: Discharge Planning  Goal: Discharge to home or other facility with appropriate resources  6/11/2022 1220 by Sharron Sandhoff, RN  Outcome: Progressing     Problem: Pain  Goal: Verbalizes/displays adequate comfort level or baseline comfort level  6/11/2022 1220 by Sharron Sandhoff, RN  Outcome: Progressing     Problem: Safety - Adult  Goal: Free from fall injury  6/11/2022 1220 by Sharron Sandhoff, RN  Outcome: Progressing     Problem: ABCDS Injury Assessment  Goal: Absence of physical injury  6/11/2022 1220 by Sharron Sandhoff, RN  Outcome: Progressing

## 2022-06-11 NOTE — PLAN OF CARE
Problem: Discharge Planning  Goal: Discharge to home or other facility with appropriate resources  6/11/2022 1613 by Aston Barajas RN  Outcome: Progressing     Problem: Pain  Goal: Verbalizes/displays adequate comfort level or baseline comfort level  6/11/2022 1613 by Aston Barajas RN  Outcome: Progressing     Problem: Safety - Adult  Goal: Free from fall injury  6/11/2022 1613 by Aston Barajas RN  Outcome: Progressing     Problem: ABCDS Injury Assessment  Goal: Absence of physical injury  6/11/2022 1613 by Aston Barajas RN  Outcome: Progressing

## 2022-06-11 NOTE — PROGRESS NOTES
Hospitalist Progress Note      PCP: Rodger Carrero MD    Date of Admission: 6/9/2022        Hospital Course:  **60 y.o. male presented with CHEST PAIN, ONSET SEVERAL YEARS  AGO, TIGHTNESS IN CHARACTER, NO NV, SOB OR DIAPHORESIS, POS PALPITATIONS** GIVEN  IMDUR, BECAME HYPOTENSIVE, HAD TO BE GIVEN FLUIDS        Subjective: ** WEAK          Medications:  Reviewed    Infusion Medications    sodium chloride 100 mL/hr at 06/11/22 1214    sodium chloride       Scheduled Medications    isosorbide dinitrate  10 mg Oral TID    sodium chloride  500 mL IntraVENous Once    docusate sodium  100 mg Oral Nightly    pantoprazole  40 mg Oral QAM AC    carvedilol  25 mg Oral BID WC    amLODIPine  10 mg Oral QAM    hydrALAZINE  100 mg Oral TID    vitamin D  2,000 Units Oral Daily    sodium chloride flush  5-40 mL IntraVENous 2 times per day    aspirin  81 mg Oral Daily    enoxaparin  40 mg SubCUTAneous Daily    atorvastatin  40 mg Oral Nightly    polyethylene glycol  17 g Oral Daily    elvitegravir-cobicistat-emtricitabine-tenofovir alafenamide  1 tablet Oral Nightly     PRN Meds: perflutren lipid microspheres, sodium chloride flush, sodium chloride, ondansetron **OR** ondansetron, acetaminophen **OR** acetaminophen, magnesium hydroxide, hydrOXYzine pamoate      Intake/Output Summary (Last 24 hours) at 6/11/2022 1449  Last data filed at 6/10/2022 2040  Gross per 24 hour   Intake 10 ml   Output --   Net 10 ml       Exam:    /62   Pulse 70   Temp 97.2 °F (36.2 °C) (Temporal)   Resp 18   Ht 5' 7\" (1.702 m)   Wt 196 lb 9.6 oz (89.2 kg)   SpO2 95%   BMI 30.79 kg/m²       General appearance:  No apparent distress, appears stated age and cooperative. HEENT:  Normal cephalic, atraumatic without obvious deformity. Neck: Supple, with full range of motion. Respiratory:  Normal respiratory effort. Clear to auscultation, bilaterally without Rales/Wheezes/Rhonchi.   Cardiovascular:  Regular rate and rhythm Abdomen: Soft, non-tender, non-distended with normal bowel sounds. Musculoskeletal:  No clubbing, cyanosis or edema bilaterally. Skin: Skin color, texture, turgor normal.  No rashes or lesions. Neurologic:  Neurovascularly intact   Psychiatric:  Alert and oriented, t           Labs:   Recent Labs     06/09/22  1221 06/10/22  0634   WBC 4.8 5.4   HGB 14.3 13.5   HCT 42.8 40.6    252     Recent Labs     06/09/22  1221 06/10/22  0635 06/11/22  0602    138 137   K 3.8 3.6 3.7    101 101   CO2 22 20* 19*   BUN 13 16 22*   CREATININE 1.4* 1.7* 2.2*   CALCIUM 9.5 9.8 9.8     Recent Labs     06/09/22  1221   AST 25   ALT 26   BILITOT 0.5   ALKPHOS 75     Recent Labs     06/09/22  1221   INR 1.1     No results for input(s): Lenin Favre in the last 72 hours. Recent Labs     06/09/22  1221   AST 25   ALT 26   BILITOT 0.5   ALKPHOS 75     No results for input(s): LACTA in the last 72 hours. No results found for: Reginaldo Barefoot  No results found for: AMMONIA    Assessment:    Active Hospital Problems    Diagnosis Date Noted    Angina of effort [facility]) [I20.8]      Priority: Medium    Chest pain [R07.9] 06/09/2022     Priority: Medium   HYPOTENSIVE EPISODE AFTER IMDUR  HEPATITIS C   HIV   HTN   CKD 2        PLAN:  CARD CONSULT   COREG   LIPITOR   NORVASC   HYDRALAZINE   GENVOYA  ASPIRIN   FLUIDS     DVT Prophylaxis: *LOVENOX  Diet: ADULT DIET; Regular;  Low Sodium (2 gm)  Diet NPO Exceptions are: Sips of Water with Meds  Code Status: Full Code     PT/OT Eval Status: *ORDERED     Dispo - *HOME      Electronically signed by Tad Johansen DO on 6/11/2022 at 2:49 PM [facility]

## 2022-06-11 NOTE — PROGRESS NOTES
INPATIENT CARDIOLOGY FOLLOW-UP    Name: Gianluca Mejia    Age: 60 y.o. Date of Admission: 6/9/2022 11:53 AM    Date of Service: 6/11/2022    Chief Complaint: Follow-up for chest pain     Interim History:  No new overnight cardiac complaints. No further episodes of chest pain after coming to the hospital. Had exertional chest tightness and dyspnea for one year. Currently with no complaints of CP, SOB, palpitations, dizziness, or lightheadedness. SR on telemetry. Review of Systems:   Cardiac: As per HPI  General: No fever, chills  Pulmonary: As per HPI  HEENT: No visual disturbances, difficult swallowing  GI: No nausea, vomiting  Endocrine: No thyroid disease or DM  Musculoskeletal: STOLL x 4, no focal motor deficits  Skin: Intact, no rashes  Neuro/Psych: No headache or seizures    Problem List:  Patient Active Problem List   Diagnosis    Chronic kidney disease, stage II (mild)    Hypertension    [facility]    Chronic hepatitis C without hepatic coma ([facility]    Umbilical hernia without obstruction and without gangrene    Heart murmur, systolic    History of colon polyps    Chest pain       Allergies:   Allergies   Allergen Reactions    Penicillins Rash       Current Medications:  Current Facility-Administered Medications   Medication Dose Route Frequency Provider Last Rate Last Admin    technetium sestamibi (CARDIOLITE) injection 35 millicurie  35 millicurie IntraVENous ONCE PRN Samara Quiroz MD        docusate sodium (COLACE) capsule 100 mg  100 mg Oral Nightly Maurice Shallotte Finely, DO   100 mg at 06/10/22 2039    pantoprazole (PROTONIX) tablet 40 mg  40 mg Oral QAM  Maurice Claude Finely, DO   40 mg at 06/11/22 4081    regadenoson (LEXISCAN) injection 0.4 mg  0.4 mg IntraVENous ONCE PRN LYRIC Coleman - CNP        carvedilol (COREG) tablet 25 mg  25 mg Oral BID  Chip Ortega MD   25 mg at 06/11/22 0808    perflutren lipid microspheres (DEFINITY) injection 1.65 mg  1.5 mL IntraVENous ONCE PRN Jocelin Ana, APRN - CNP        amLODIPine (NORVASC) tablet 10 mg  10 mg Oral QAM Dorene Hartsburg, APRN - CNP   10 mg at 06/11/22 5301    hydrALAZINE (APRESOLINE) tablet 100 mg  100 mg Oral TID Dorene Hartsburg, APRN - CNP   100 mg at 06/11/22 3807    Vitamin D (CHOLECALCIFEROL) tablet 2,000 Units  2,000 Units Oral Daily Dorene Hartsburg, APRN - CNP   2,000 Units at 06/11/22 2028    sodium chloride flush 0.9 % injection 5-40 mL  5-40 mL IntraVENous 2 times per day Dorene Hartsburg, APRN - CNP   10 mL at 06/11/22 0808    sodium chloride flush 0.9 % injection 10 mL  10 mL IntraVENous PRN Dorene Hartsburg, APRN - CNP        0.9 % sodium chloride infusion   IntraVENous PRN Dorene Hartsburg, APRN - CNP        ondansetron (ZOFRAN-ODT) disintegrating tablet 4 mg  4 mg Oral Q8H PRN Dorene Hartsburg, APRN - CNP        Or    ondansetron (ZOFRAN) injection 4 mg  4 mg IntraVENous Q6H PRN Dorene Hartsburg, APRN - CNP        acetaminophen (TYLENOL) tablet 650 mg  650 mg Oral Q6H PRN Dorene Hartsburg, APRN - CNP        Or    acetaminophen (TYLENOL) suppository 650 mg  650 mg Rectal Q6H PRN Dorene Hartsburg, APRN - CNP        magnesium hydroxide (MILK OF MAGNESIA) 400 MG/5ML suspension 30 mL  30 mL Oral Daily PRN Dorene Hartsburg, APRN - CNP        aspirin chewable tablet 81 mg  81 mg Oral Daily Dorene Hartsburg, APRN - CNP   81 mg at 06/11/22 0808    enoxaparin (LOVENOX) injection 40 mg  40 mg SubCUTAneous Daily Dorene Hartsburg, APRN - CNP   40 mg at 06/11/22 0808    atorvastatin (LIPITOR) tablet 40 mg  40 mg Oral Nightly Dorene Hartsburg, APRN - CNP   40 mg at 06/10/22 2039    polyethylene glycol (GLYCOLAX) packet 17 g  17 g Oral Daily Dorene Hartsburg, APRN - CNP   17 g at 06/10/22 0901    hydrOXYzine pamoate (VISTARIL) capsule 25 mg  25 mg Oral TID PRN Marko Iniguez MD        elvitegravir-cobicistat-emtricitabine-tenofovir alafenamide (GENVOYA) [phone] MG 06/09/2022    PROTIME 11.4 06/09/2022     No results found for: TSHFT4, TSH  Lab Results   Component Value Date    LABA1C 4.8 06/10/2022     No results found for: EAG  Lab Results   Component Value Date    CHOL 159 06/10/2022     Lab Results   Component Value Date    TRIG 112 06/10/2022     Lab Results   Component Value Date    HDL 39 06/10/2022     Lab Results   Component Value Date    LDLCALC 98 06/10/2022     Lab Results   Component Value Date    LABVLDL 22 06/10/2022     No results found for: CHOLHDLRATIO    Cardiac Tests:  ECG: NSR, 1st AVB, LBBB, abnormal EKG    Telemetry findings reviewed: NSR with heart rate in the 70s    Vitals and labs reviewed: as above      Echocardiogram: pending      Stress test:  pending      Cardiac catheterization:       ASSESSMENT:  · Anginal with exertion, rule out ischemia. · Elevated troponin, mostly from CKD, but rule out ischemia  · Hypertensive emergency with history of underlying chronic hypertension, improving. Blood pressure goal less than 120/80  · New left bundle branch block  · CKD stage III with ENMANUEL, serum creatinine 1.4>> 1.7>> 2.2, follows with Dr. Esdras Yu from the nephrology service  · History of hepatitis C treated  · History of HIV on retroviral medications. · ASCVD score 16.3  · Mild obesity with a BMI of 31    Plan:   · Keep him n.p.o. patient is scheduled for Lexiscan nuclear stress test to rule out ischemia  · Monitor renal functions. · Echo to assess LV function and wall motion abnormalities is pending  · Continue aspirin and high-dose statin therapy with Lipitor 40 daily  · Continue Coreg 25 mg p.o. twice daily, amlodipine 10 mg daily and hydralazine 3 times a day for hypertension. I would also recommend adding Isordil 10 mg p.o. 3 times a day for chest pains  · Agree with IV fluids due to ENMANUEL, increase free water intake. · Will follow-up on the stress and echo results. Stacey Pavon MD., Yanira Wright.   [facility]) Cardiology

## 2022-06-11 NOTE — PROCEDURES
[place] Nuclear Stress Test:    Cardiologist: Dr. Preet Jacob EKG: Normal sinus rhythm, first-degree AV block, left bundle branch block, abnormal EKG. Indications for study: Exertional angina. 1. No chest pain  2. No new arrhythmias  3. No EKG changes suggestive of stress induced ischemia  4. Nuclear images pending    Mukul Cueva MD., [facility] [place].    [facility]) Cardiology

## 2022-06-12 LAB
ANION GAP SERPL CALCULATED.3IONS-SCNC: 17 MMOL/L (ref 7–16)
BUN BLDV-MCNC: 17 MG/DL (ref 6–20)
CALCIUM SERPL-MCNC: 9 MG/DL (ref 8.6–10.2)
CHLORIDE BLD-SCNC: 108 MMOL/L (ref 98–107)
CO2: 17 MMOL/L (ref 22–29)
CREAT SERPL-MCNC: 1.8 MG/DL (ref 0.7–1.2)
GFR AFRICAN AMERICAN: 47
GFR NON-AFRICAN AMERICAN: 47 ML/MIN/1.73
GLUCOSE BLD-MCNC: 114 MG/DL (ref 74–99)
POTASSIUM SERPL-SCNC: 3.8 MMOL/L (ref 3.5–5)
SODIUM BLD-SCNC: 142 MMOL/L (ref 132–146)

## 2022-06-12 PROCEDURE — 6360000002 HC RX W HCPCS: Performed by: NURSE PRACTITIONER

## 2022-06-12 PROCEDURE — 36415 COLL VENOUS BLD VENIPUNCTURE: CPT

## 2022-06-12 PROCEDURE — 80048 BASIC METABOLIC PNL TOTAL CA: CPT

## 2022-06-12 PROCEDURE — 99233 SBSQ HOSP IP/OBS HIGH 50: CPT | Performed by: INTERNAL MEDICINE

## 2022-06-12 PROCEDURE — 2580000003 HC RX 258: Performed by: INTERNAL MEDICINE

## 2022-06-12 PROCEDURE — 93005 ELECTROCARDIOGRAM TRACING: CPT | Performed by: INTERNAL MEDICINE

## 2022-06-12 PROCEDURE — 6370000000 HC RX 637 (ALT 250 FOR IP): Performed by: NURSE PRACTITIONER

## 2022-06-12 PROCEDURE — 6370000000 HC RX 637 (ALT 250 FOR IP): Performed by: INTERNAL MEDICINE

## 2022-06-12 PROCEDURE — 2060000000 HC ICU INTERMEDIATE R&B

## 2022-06-12 PROCEDURE — 2580000003 HC RX 258: Performed by: NURSE PRACTITIONER

## 2022-06-12 RX ADMIN — ASPIRIN 81 MG CHEWABLE TABLET 81 MG: 81 TABLET CHEWABLE at 08:46

## 2022-06-12 RX ADMIN — HYDRALAZINE HYDROCHLORIDE 100 MG: 50 TABLET, FILM COATED ORAL at 08:46

## 2022-06-12 RX ADMIN — PANTOPRAZOLE SODIUM 40 MG: 40 TABLET, DELAYED RELEASE ORAL at 05:10

## 2022-06-12 RX ADMIN — MAGNESIUM HYDROXIDE 30 ML: 1200 LIQUID ORAL at 10:57

## 2022-06-12 RX ADMIN — SODIUM CHLORIDE: 9 INJECTION, SOLUTION INTRAVENOUS at 19:35

## 2022-06-12 RX ADMIN — SODIUM CHLORIDE, PRESERVATIVE FREE 10 ML: 5 INJECTION INTRAVENOUS at 08:52

## 2022-06-12 RX ADMIN — ATORVASTATIN CALCIUM 40 MG: 40 TABLET, FILM COATED ORAL at 21:19

## 2022-06-12 RX ADMIN — POLYETHYLENE GLYCOL 3350 17 G: 17 POWDER, FOR SOLUTION ORAL at 08:46

## 2022-06-12 RX ADMIN — HYDRALAZINE HYDROCHLORIDE 100 MG: 50 TABLET, FILM COATED ORAL at 21:19

## 2022-06-12 RX ADMIN — AMLODIPINE BESYLATE 10 MG: 10 TABLET ORAL at 08:46

## 2022-06-12 RX ADMIN — SODIUM CHLORIDE: 9 INJECTION, SOLUTION INTRAVENOUS at 08:52

## 2022-06-12 RX ADMIN — DOCUSATE SODIUM 100 MG: 100 CAPSULE, LIQUID FILLED ORAL at 21:19

## 2022-06-12 RX ADMIN — HYDRALAZINE HYDROCHLORIDE 100 MG: 50 TABLET, FILM COATED ORAL at 14:08

## 2022-06-12 RX ADMIN — ELVITEGRAVIR, COBICISTAT, EMTRICITABINE, AND TENOFOVIR ALAFENAMIDE 1 TABLET: 150; 150; 200; 10 TABLET ORAL at 21:19

## 2022-06-12 RX ADMIN — Medication 2000 UNITS: at 08:46

## 2022-06-12 RX ADMIN — ENOXAPARIN SODIUM 40 MG: 100 INJECTION SUBCUTANEOUS at 08:46

## 2022-06-12 NOTE — PLAN OF CARE
Problem: Discharge Planning  Goal: Discharge to home or other facility with appropriate resources  6/12/2022 1002 by Deyvi Ceja RN  Outcome: Progressing     Problem: Pain  Goal: Verbalizes/displays adequate comfort level or baseline comfort level  6/12/2022 1002 by Deyvi Ceja RN  Outcome: Progressing     Problem: Safety - Adult  Goal: Free from fall injury  6/12/2022 1002 by Deyvi Ceja RN  Outcome: Progressing     Problem: ABCDS Injury Assessment  Goal: Absence of physical injury  6/12/2022 1002 by Deyvi Ceja RN  Outcome: Progressing

## 2022-06-12 NOTE — PLAN OF CARE
Problem: Discharge Planning  Goal: Discharge to home or other facility with appropriate resources  6/12/2022 0259 by Sugar Matos RN  Outcome: Progressing  6/12/2022 0259 by Sugar Matos RN  Outcome: Progressing  6/11/2022 1613 by Helena Mccall RN  Outcome: Progressing     Problem: Pain  Goal: Verbalizes/displays adequate comfort level or baseline comfort level  6/12/2022 0259 by Sugar Matos RN  Outcome: Progressing  6/12/2022 0259 by Sugar Matos RN  Outcome: Progressing  6/11/2022 1613 by Helena Mccall RN  Outcome: Progressing     Problem: Safety - Adult  Goal: Free from fall injury  6/12/2022 0259 by Sugar Matos RN  Outcome: Progressing  6/12/2022 0259 by Sugar Matos RN  Outcome: Progressing  6/11/2022 1613 by Helena Mccall RN  Outcome: Progressing     Problem: ABCDS Injury Assessment  Goal: Absence of physical injury  6/12/2022 0259 by Sugar Matos RN  Outcome: Progressing  6/12/2022 0259 by Sugar Matos RN  Outcome: Progressing  6/11/2022 1613 by Helena Mccall RN  Outcome: Progressing

## 2022-06-12 NOTE — PLAN OF CARE
Problem: Discharge Planning  Goal: Discharge to home or other facility with appropriate resources  6/12/2022 1549 by Lan King RN  Outcome: Progressing     Problem: Pain  Goal: Verbalizes/displays adequate comfort level or baseline comfort level  6/12/2022 1549 by Lan King RN  Outcome: Progressing     Problem: Safety - Adult  Goal: Free from fall injury  6/12/2022 1549 by Lan King RN  Outcome: Progressing     Problem: ABCDS Injury Assessment  Goal: Absence of physical injury  6/12/2022 1549 by Lan King RN  Outcome: Progressing

## 2022-06-12 NOTE — PROGRESS NOTES
Notified cardiology that pt. Rhythm appears to be wenckebach and HR are in the 40-50 range. Asked if coreg should still be given, also notified that patient is refusing isordil due to hypotensive episode yesterday. Was advised to hold coreg, and notified that EKG was sinus rhythm with 2nd degree AV block (Mobitz II) Left bundle branch block.

## 2022-06-12 NOTE — PROGRESS NOTES
Hospitalist Progress Note      PCP: Kelsy Tovar MD    Date of Admission: 6/9/2022        Hospital Course:  **60 y. o. male presented with CHEST PAIN, ONSET SEVERAL YEARS  AGO, TIGHTNESS IN CHARACTER, NO NV, SOB OR DIAPHORESIS, POS PALPITATIONS** GIVEN  IMDUR, BECAME HYPOTENSIVE, HAD TO BE GIVEN FLUIDS on 6. 11. That evening he started to feel hypotensive again ** NM scan showed a large sized severe perfusion defect in the LAD territory ,    **        Subjective: **no complaints this am          Medications:  Reviewed    Infusion Medications    sodium chloride 100 mL/hr at 06/12/22 0852    sodium chloride       Scheduled Medications    isosorbide dinitrate  10 mg Oral TID    docusate sodium  100 mg Oral Nightly    pantoprazole  40 mg Oral QAM AC    carvedilol  25 mg Oral BID WC    amLODIPine  10 mg Oral QAM    hydrALAZINE  100 mg Oral TID    vitamin D  2,000 Units Oral Daily    sodium chloride flush  5-40 mL IntraVENous 2 times per day    aspirin  81 mg Oral Daily    enoxaparin  40 mg SubCUTAneous Daily    atorvastatin  40 mg Oral Nightly    polyethylene glycol  17 g Oral Daily    elvitegravir-cobicistat-emtricitabine-tenofovir alafenamide  1 tablet Oral Nightly     PRN Meds: perflutren lipid microspheres, sodium chloride flush, sodium chloride, ondansetron **OR** ondansetron, acetaminophen **OR** acetaminophen, magnesium hydroxide, hydrOXYzine pamoate      Intake/Output Summary (Last 24 hours) at 6/12/2022 1135  Last data filed at 6/12/2022 0511  Gross per 24 hour   Intake 536 ml   Output 1250 ml   Net -714 ml       Exam:    /83   Pulse (!) 47   Temp 98.1 °F (36.7 °C) (Temporal)   Resp 18   Ht 5' 7\" (1.702 m)   Wt 198 lb 6.4 oz (90 kg)   SpO2 95%   BMI 31.07 kg/m²       General appearance:  No apparent distress  HEENT:  Normal cephalic, atraumatic without obvious deformity. Neck: Supple, with full range of motion. Respiratory:  Normal respiratory effort.  Clear to auscultation, bilaterally without Rales/Wheezes/Rhonchi. Cardiovascular:  Regular rate and rhythm   Abdomen: Soft, non-tender, non-distended with normal bowel sounds. Musculoskeletal:  No clubbing, cyanosis or edema bilaterally.    Skin: Skin color, texture, turgor normal.  No rashes or lesions. Neurologic:  Neurovascularly intact   Psychiatric:  Alert and oriented, t                Labs:   Recent Labs     06/09/22  1221 06/10/22  0634   WBC 4.8 5.4   HGB 14.3 13.5   HCT 42.8 40.6    252     Recent Labs     06/10/22  0635 06/11/22  0602 06/12/22  0617    137 142   K 3.6 3.7 3.8    101 108*   CO2 20* 19* 17*   BUN 16 22* 17   CREATININE 1.7* 2.2* 1.8*   CALCIUM 9.8 9.8 9.0     Recent Labs     06/09/22  1221   AST 25   ALT 26   BILITOT 0.5   ALKPHOS 75     Recent Labs     06/09/22  1221   INR 1.1     No results for input(s): CKTOTAL, TROPONINI in the last 72 hours. Recent Labs     06/09/22  1221   AST 25   ALT 26   BILITOT 0.5   ALKPHOS 75     No results for input(s): LACTA in the last 72 hours. No results found for: Carloyn [place]  No results found for: AMMONIA    Assessment:    Active Hospital Problems    Diagnosis Date Noted    Angina of effort [facility]) [I20.8]      Priority: Medium    Chest pain [R07.9] 06/09/2022     Priority: Medium   HYPOTENSIVE EPISODE AFTER IMDUR  HEPATITIS C   HIV   HTN   CKD 2        PLAN:  CARD CONSULT  [address]   FLUIDS   ? Cath    DVT Prophylaxis: *LOVENOX  Diet: ADULT DIET; Regular;  Low Sodium (2 gm)  Diet NPO Exceptions are: Sips of Water with Meds  Code Status: Full Code     PT/OT Eval Status: *ORDERED     Dispo - *HOME         Electronically signed by Janneth Hudson DO on 6/12/2022 at 11:35 AM Chey Amin n/a

## 2022-06-12 NOTE — PROGRESS NOTES
INPATIENT CARDIOLOGY FOLLOW-UP    Name: Scout Alonzo    Age: 60 y.o. Date of Admission: 6/9/2022 11:53 AM    Date of Service: 6/12/2022    Chief Complaint: Follow-up for chest pain     Interim History:  No new overnight cardiac complaints. No further episodes of chest pain after coming to the hospital. Had exertional chest tightness and dyspnea for one year. Yesterday when he took Isordil 10 mg he felt very lightheaded dizzy and his blood pressure dropped. He is very uncomfortable trying again and would like to stop Isordil. Currently with no complaints of CP, SOB, palpitations, dizziness, or lightheadedness. Patient had a stress test which showed large area of ischemia involving the anterior wall. SR on telemetry. Review of Systems:   Cardiac: As per HPI  General: No fever, chills  Pulmonary: As per HPI  HEENT: No visual disturbances, difficult swallowing  GI: No nausea, vomiting  Endocrine: No thyroid disease or DM  Musculoskeletal: STOLL x 4, no focal motor deficits  Skin: Intact, no rashes  Neuro/Psych: No headache or seizures    Problem List:  Patient Active Problem List   Diagnosis    Chronic kidney disease, stage II (mild)    Hypertension    Human immunodeficiency virus (HIV) disease ([address]    Chronic hepatitis C without hepatic coma ([address]    Umbilical hernia without obstruction and without gangrene    Heart murmur, systolic    History of colon polyps    Chest pain    Angina of effort ([address]       Allergies:   Allergies   Allergen Reactions    Penicillins Rash       Current Medications:  Current Facility-Administered Medications   Medication Dose Route Frequency Provider Last Rate Last Admin    0.9 % sodium chloride infusion   IntraVENous Continuous Rosevelt DO Marla 100 mL/hr at 06/12/22 0852 New Bag at 06/12/22 0852    isosorbide dinitrate (ISORDIL) tablet 10 mg  10 mg Oral TID Preeti Zimmerman MD   10 mg at 06/11/22 1211    docusate sodium (COLACE) capsule 100 mg  100 mg Oral Nightly Maurice Españas Flaming, DO   100 mg at 06/11/22 2147    pantoprazole (PROTONIX) tablet 40 mg  40 mg Oral QAM AC Maurice Españas Flaming, DO   40 mg at 06/12/22 0510    carvedilol (COREG) tablet 25 mg  25 mg Oral BID  Chip Sherwin Ortega MD   25 mg at 06/11/22 1711    perflutren lipid microspheres (DEFINITY) injection 1.65 mg  1.5 mL IntraVENous ONCE PRN Levorn Tyler, APRN - CNP        amLODIPine (NORVASC) tablet 10 mg  10 mg Oral QAM Brighton Arguelles, APRN - CNP   10 mg at 06/12/22 0846    hydrALAZINE (APRESOLINE) tablet 100 mg  100 mg Oral TID Brighton Arguelles, APRN - CNP   100 mg at 06/12/22 1408    Vitamin D (CHOLECALCIFEROL) tablet 2,000 Units  2,000 Units Oral Daily Brighton Arguelles, APRN - CNP   2,000 Units at 06/12/22 0846    sodium chloride flush 0.9 % injection 5-40 mL  5-40 mL IntraVENous 2 times per day Brighton Arguelles, APRN - CNP   10 mL at 06/12/22 0852    sodium chloride flush 0.9 % injection 10 mL  10 mL IntraVENous PRN Brighton Arguelles, APRN - CNP        0.9 % sodium chloride infusion   IntraVENous PRN Brighton Arguelles, APRN - CNP        ondansetron (ZOFRAN-ODT) disintegrating tablet 4 mg  4 mg Oral Q8H PRN Brighton Arguelles, APRN - CNP        Or    ondansetron (ZOFRAN) injection 4 mg  4 mg IntraVENous Q6H PRN Brighton Arguelles, APRN - CNP        acetaminophen (TYLENOL) tablet 650 mg  650 mg Oral Q6H PRN Brighton Arguelles, APRN - CNP        Or    acetaminophen (TYLENOL) suppository 650 mg  650 mg Rectal Q6H PRN Brighton Arguelles, APRN - CNP        magnesium hydroxide (MILK OF MAGNESIA) 400 MG/5ML suspension 30 mL  30 mL Oral Daily PRN Brighton Arguelles, APRN - CNP   30 mL at 06/12/22 1057    aspirin chewable tablet 81 mg  81 mg Oral Daily Brighton Arguelles, APRN - CNP   81 mg at 06/12/22 0846    enoxaparin (LOVENOX) injection 40 mg  40 mg SubCUTAneous Daily LYRIC Cordova CNP   40 mg at 06/12/22 0846    atorvastatin (LIPITOR) tablet 40 mg  40 mg Oral Nightly Yanique Arguelles, LYRIC Lind CNP 40 mg at 06/11/22 2147    polyethylene glycol (GLYCOLAX) packet 17 g  17 g Oral Daily Néstor Mercado APRN - CNP   17 g at 06/12/22 0846    hydrOXYzine pamoate (VISTARIL) capsule 25 mg  25 mg Oral TID PRN Rodrigo Pereira MD        elvitegravir-cobicistat-emtricitabine-tenofovir alafenamide (GENVOYA) [phone] MG TABLET 1 tablet  1 tablet Oral Nightly Rodrigo Pereira MD   1 tablet at 06/11/22 2147      sodium chloride 100 mL/hr at 06/12/22 0852    sodium chloride         Physical Exam:  /86   Pulse 80   Temp 97.3 °F (36.3 °C) (Temporal)   Resp 16   Ht 5' 7\" (1.702 m)   Wt 198 lb 6.4 oz (90 kg)   SpO2 95%   BMI 31.07 kg/m²   Wt Readings from Last 3 Encounters:   06/12/22 198 lb 6.4 oz (90 kg)   06/18/21 199 lb (90.3 kg)   04/29/21 199 lb (90.3 kg)     Appearance: Awake, alert, no acute respiratory distress  Skin: Intact, no rash  Head: Normocephalic, atraumatic  Eyes: EOMI, no conjunctival erythema  ENMT: No pharyngeal erythema, MMM, no rhinorrhea  Neck: Supple, no elevated JVP, no carotid bruits  Lungs: Clear to auscultation bilaterally. No wheezes, rales, or rhonchi. Cardiac: Regular rate and rhythm, +S1S2, no murmurs apparent  Abdomen: Soft, nontender, +bowel sounds  Extremities: Moves all extremities x 4, no lower extremity edema  Neurologic: No focal motor deficits apparent, normal mood and affect  Peripheral Pulses: Intact posterior tibial pulses bilaterally    Intake/Output:    Intake/Output Summary (Last 24 hours) at 6/12/2022 1653  Last data filed at 6/12/2022 1411  Gross per 24 hour   Intake 490 ml   Output 1250 ml   Net -760 ml     I/O this shift:   In: 480 [P.O.:480]  Out: -     Laboratory Tests:  Recent Labs     06/10/22  0635 06/11/22  0602 06/12/22  0617    137 142   K 3.6 3.7 3.8    101 108*   CO2 20* 19* 17*   BUN 16 22* 17   CREATININE 1.7* 2.2* 1.8*   GLUCOSE 107* 109* 114*   CALCIUM 9.8 9.8 9.0     No results found for: MG  No results for input(s): ALKPHOS, ALT, AST, PROT, BILITOT, BILIDIR, LABALBU in the last 72 hours. Recent Labs     06/10/22  0634   WBC 5.4   RBC 4.19   HGB 13.5   HCT 40.6   MCV 96.9   MCH 32.2   MCHC 33.3   RDW 12.9      MPV 10.0     Lab Results   Component Value Date    TROPONINI <0.01 03/03/2020     Lab Results   Component Value Date    INR 1.1 06/09/2022    PROTIME 11.4 06/09/2022     No results found for: TSHFT4, TSH  Lab Results   Component Value Date    LABA1C 4.8 06/10/2022     No results found for: EAG  Lab Results   Component Value Date    CHOL 159 06/10/2022     Lab Results   Component Value Date    TRIG 112 06/10/2022     Lab Results   Component Value Date    HDL 39 06/10/2022     Lab Results   Component Value Date    LDLCALC 98 06/10/2022     Lab Results   Component Value Date    LABVLDL 22 06/10/2022     No results found for: CHOLHDLRATIO    Cardiac Tests:  ECG: NSR, 1st AVB, LBBB, abnormal EKG    Telemetry findings reviewed: NSR with heart rate in the 70s    Vitals and labs reviewed: Blood pressure 125/86 heart rate 80 sats 95% room air    Labs- BUNs/creatinine 22/2.2>> 17/1.8. LDL 98, HDL 39, cholesterol 159 triglyceride 112. Echocardiogram-6/10/2022:    Normal left ventricle size and systolic function. Ejection fraction is visually estimated at 55-60%. Septal motion consistent with conduction abnormality . Normal left ventricle wall thickness. Indeterminate diastolic function. Normal right ventricular size and function. TAPSE 32 mm. No hemodynamically significant aortic stenosis is present. Mild tricuspid regurgitation. RVSP is 37 mmHg. Normal estimated PA systolic pressure. No evidence for hemodynamically significant pericardial effusion. Lexiscan nuclear stress test- 6/10/2022:  1.  ECG during the infusion did not change.     2.  The myocardial perfusion imaging was abnormal   3.  The abnormality was a large sized severe perfusion defect   involving the anterior and anteroseptal walls suggestive ischemia in the LAD territory. 4.  Overall left ventricular systolic function was normal with   regional wall motion abnormalities. 5.  There is transient ischemic dilatation. 6.  High risk general pharmacologic stress test.         Cardiac catheterization:       ASSESSMENT:  · Anginal with exertion, stress test showed large area of high risk ischemia involving the LAD territory. There is also transient ischemic dilatation, rule out multivessel disease or left main or proximal LAD disease. · Elevated troponin, mostly from CKD, but rule out ischemia  · Hypertensive emergency with history of underlying chronic hypertension, improving. Blood pressure goal less than 120/80  · Hypotension secondary to Isordil. · New left bundle branch block  · CKD stage III with ENMANUEL, serum creatinine 1.4>> 1.7>> 2.2>> 1.8, follows with Dr. Weston Guillory from the nephrology service  · History of hepatitis C treated  · History of HIV on retroviral medications. · ASCVD score 16.3  · Mild obesity with a BMI of 31    Plan:   · Keep him n.p.o. patient is scheduled for cath in AM  · Increase IV fluids to 125 ml/hour and check BMP in AM  · Monitor renal functions. · Echo and stress test results were reviewed, as above and discussed with the patient. Has a  · Continue aspirin and high-dose statin therapy with Lipitor 40 daily  · Continue Coreg 25 mg p.o. twice daily, amlodipine 10 mg daily and hydralazine 3 times a day for hypertension. Discontinue Isordil. · Agree with IV fluids due to ENMANUEL, increase free water intake. Raina Kendrick MD., Wallace Jin.   [facility]) Cardiology

## 2022-06-13 LAB
ABO/RH: NORMAL
ANION GAP SERPL CALCULATED.3IONS-SCNC: 11 MMOL/L (ref 7–16)
ANTIBODY SCREEN: NORMAL
BUN BLDV-MCNC: 12 MG/DL (ref 6–20)
CALCIUM SERPL-MCNC: 9.2 MG/DL (ref 8.6–10.2)
CHLORIDE BLD-SCNC: 108 MMOL/L (ref 98–107)
CO2: 23 MMOL/L (ref 22–29)
CREAT SERPL-MCNC: 1.7 MG/DL (ref 0.7–1.2)
GFR AFRICAN AMERICAN: 50
GFR NON-AFRICAN AMERICAN: 50 ML/MIN/1.73
GLUCOSE BLD-MCNC: 103 MG/DL (ref 74–99)
LV EF: 55 %
LVEF MODALITY: NORMAL
POTASSIUM SERPL-SCNC: 4.2 MMOL/L (ref 3.5–5)
SODIUM BLD-SCNC: 142 MMOL/L (ref 132–146)

## 2022-06-13 PROCEDURE — C1769 GUIDE WIRE: HCPCS

## 2022-06-13 PROCEDURE — 6360000002 HC RX W HCPCS: Performed by: NURSE PRACTITIONER

## 2022-06-13 PROCEDURE — 2060000000 HC ICU INTERMEDIATE R&B

## 2022-06-13 PROCEDURE — 2709999900 HC NON-CHARGEABLE SUPPLY

## 2022-06-13 PROCEDURE — 86900 BLOOD TYPING SEROLOGIC ABO: CPT

## 2022-06-13 PROCEDURE — 2500000003 HC RX 250 WO HCPCS

## 2022-06-13 PROCEDURE — C1894 INTRO/SHEATH, NON-LASER: HCPCS

## 2022-06-13 PROCEDURE — 93458 L HRT ARTERY/VENTRICLE ANGIO: CPT | Performed by: INTERNAL MEDICINE

## 2022-06-13 PROCEDURE — 80048 BASIC METABOLIC PNL TOTAL CA: CPT

## 2022-06-13 PROCEDURE — 36415 COLL VENOUS BLD VENIPUNCTURE: CPT

## 2022-06-13 PROCEDURE — 4A023N7 MEASUREMENT OF CARDIAC SAMPLING AND PRESSURE, LEFT HEART, PERCUTANEOUS APPROACH: ICD-10-PCS | Performed by: INTERNAL MEDICINE

## 2022-06-13 PROCEDURE — C1887 CATHETER, GUIDING: HCPCS

## 2022-06-13 PROCEDURE — 99254 IP/OBS CNSLTJ NEW/EST MOD 60: CPT | Performed by: INTERNAL MEDICINE

## 2022-06-13 PROCEDURE — 6370000000 HC RX 637 (ALT 250 FOR IP): Performed by: INTERNAL MEDICINE

## 2022-06-13 PROCEDURE — 6360000002 HC RX W HCPCS

## 2022-06-13 PROCEDURE — 86850 RBC ANTIBODY SCREEN: CPT

## 2022-06-13 PROCEDURE — 93458 L HRT ARTERY/VENTRICLE ANGIO: CPT

## 2022-06-13 PROCEDURE — 6370000000 HC RX 637 (ALT 250 FOR IP): Performed by: NURSE PRACTITIONER

## 2022-06-13 PROCEDURE — 86901 BLOOD TYPING SEROLOGIC RH(D): CPT

## 2022-06-13 PROCEDURE — 2580000003 HC RX 258: Performed by: INTERNAL MEDICINE

## 2022-06-13 PROCEDURE — B2111ZZ FLUOROSCOPY OF MULTIPLE CORONARY ARTERIES USING LOW OSMOLAR CONTRAST: ICD-10-PCS | Performed by: INTERNAL MEDICINE

## 2022-06-13 RX ORDER — ACETAMINOPHEN 325 MG/1
650 TABLET ORAL EVERY 4 HOURS PRN
Status: DISCONTINUED | OUTPATIENT
Start: 2022-06-13 | End: 2022-06-16 | Stop reason: HOSPADM

## 2022-06-13 RX ORDER — ACETAMINOPHEN 325 MG/1
650 TABLET ORAL EVERY 4 HOURS PRN
Status: DISCONTINUED | OUTPATIENT
Start: 2022-06-13 | End: 2022-06-13 | Stop reason: SDUPTHER

## 2022-06-13 RX ADMIN — ELVITEGRAVIR, COBICISTAT, EMTRICITABINE, AND TENOFOVIR ALAFENAMIDE 1 TABLET: 150; 150; 200; 10 TABLET ORAL at 21:45

## 2022-06-13 RX ADMIN — HYDRALAZINE HYDROCHLORIDE 100 MG: 50 TABLET, FILM COATED ORAL at 08:33

## 2022-06-13 RX ADMIN — AMLODIPINE BESYLATE 10 MG: 10 TABLET ORAL at 08:33

## 2022-06-13 RX ADMIN — HYDRALAZINE HYDROCHLORIDE 100 MG: 50 TABLET, FILM COATED ORAL at 21:45

## 2022-06-13 RX ADMIN — ATORVASTATIN CALCIUM 40 MG: 40 TABLET, FILM COATED ORAL at 21:45

## 2022-06-13 RX ADMIN — ASPIRIN 81 MG CHEWABLE TABLET 81 MG: 81 TABLET CHEWABLE at 08:32

## 2022-06-13 RX ADMIN — ENOXAPARIN SODIUM 40 MG: 100 INJECTION SUBCUTANEOUS at 08:32

## 2022-06-13 RX ADMIN — SODIUM CHLORIDE: 9 INJECTION, SOLUTION INTRAVENOUS at 03:53

## 2022-06-13 RX ADMIN — Medication 2000 UNITS: at 08:33

## 2022-06-13 NOTE — PROGRESS NOTES
Hospitalist Progress Note      PCP: Kody Coats MD    Date of Admission: 6/9/2022        Hospital Course:  **60 y. o. male presented with CHEST PAIN, ONSET SEVERAL YEARS  AGO, TIGHTNESS IN CHARACTER, NO NV, SOB OR DIAPHORESIS, POS PALPITATIONS** GIVEN  IMDUR, BECAME HYPOTENSIVE, HAD TO BE GIVEN FLUIDS on 6. 11. That evening he started to feel hypotensive again ** NM scan showed a large sized severe perfusion defect in the LAD territory ,  FOR CATH TODAY   **   *        Subjective: **NO COMPLAINTS          Medications:  Reviewed    Infusion Medications    sodium chloride 125 mL/hr at 06/13/22 0353    sodium chloride       Scheduled Medications    docusate sodium  100 mg Oral Nightly    pantoprazole  40 mg Oral QAM AC    carvedilol  25 mg Oral BID WC    amLODIPine  10 mg Oral QAM    hydrALAZINE  100 mg Oral TID    vitamin D  2,000 Units Oral Daily    sodium chloride flush  5-40 mL IntraVENous 2 times per day    aspirin  81 mg Oral Daily    enoxaparin  40 mg SubCUTAneous Daily    atorvastatin  40 mg Oral Nightly    polyethylene glycol  17 g Oral Daily    elvitegravir-cobicistat-emtricitabine-tenofovir alafenamide  1 tablet Oral Nightly     PRN Meds: perflutren lipid microspheres, sodium chloride flush, sodium chloride, ondansetron **OR** ondansetron, acetaminophen **OR** acetaminophen, magnesium hydroxide, hydrOXYzine pamoate      Intake/Output Summary (Last 24 hours) at 6/13/2022 1403  Last data filed at 6/13/2022 1402  Gross per 24 hour   Intake 1480 ml   Output 950 ml   Net 530 ml       Exam:    BP (!) 145/81   Pulse 50   Temp 98.3 °F (36.8 °C)   Resp 20   Ht 5' 7\" (1.702 m)   Wt 198 lb (89.8 kg)   SpO2 94%   BMI 31.01 kg/m²       General appearance:  No apparent distress  HEENT:  Normal cephalic, atraumatic without obvious deformity. Neck: Supple, with full range of motion. Respiratory:  Normal respiratory effort.  Clear to auscultation, bilaterally without Rales/Wheezes/Rhonchi. Cardiovascular:  Regular rate and rhythm   Abdomen: Soft, non-tender, non-distended with normal bowel sounds. Musculoskeletal:  No clubbing, cyanosis or edema bilaterally.    Skin: Skin color, texture, turgor normal.  No rashes or lesions. Neurologic:  Neurovascularly intact   Psychiatric:  Alert and oriented, t                     Labs:   No results for input(s): WBC, HGB, HCT, PLT in the last 72 hours. Recent Labs     06/11/22  0602 06/12/22  0617 06/13/22  0625    142 142   K 3.7 3.8 4.2    108* 108*   CO2 19* 17* 23   BUN 22* 17 12   CREATININE 2.2* 1.8* 1.7*   CALCIUM 9.8 9.0 9.2     No results for input(s): AST, ALT, BILIDIR, BILITOT, ALKPHOS in the last 72 hours. No results for input(s): INR in the last 72 hours. No results for input(s): Bjorn Clap in the last 72 hours. No results for input(s): AST, ALT, ALB, BILIDIR, BILITOT, ALKPHOS in the last 72 hours. No results for input(s): LACTA in the last 72 hours. No results found for: Willim Pillion  No results found for: AMMONIA    Assessment:    Active Hospital Problems    Diagnosis Date Noted    Angina of effort ([facility] [address] [I20.8]      Priority: Medium    Chest pain [R07.9] 06/09/2022     Priority: Medium   HYPOTENSIVE EPISODE AFTER IMDUR  HEPATITIS C   HIV   HTN   CKD 2        PLAN:  Domenico DELGADO   HYDRALAZINE   GENVOYA  ASPIRIN   FLUIDS    FOR Cath     DVT Prophylaxis: *LOVENOX  Diet: ADULT DIET; Regular;  Low Sodium (2 gm)  Diet NPO Exceptions are: Sips of Water with Meds  Code Status: Full Code     PT/OT Eval Status: *ORDERED     Dispo - *HOME     Electronically signed by Lorena Viera DO on 6/13/2022 at 2:03 PM [facility]

## 2022-06-13 NOTE — PROCEDURES
Procedure:    1. Left heart cath    Physician: Luke Amanda. Meera BROWN. Assistant: none    Indication: Chest pain, abnormal stress test.  AUC: 7  AUC indication: 17    Complication: None. Sedation: Intravenous Versed. Anesthesia: Xylocaine, fentanyl     Sedation time: I was present for sedation and ministration at 1545 and I ended sedation at 1600 for a total face-to-face sedation time of 15 minutes. Estimated blood loss: Minimal    Specimens: none    Contrast used: 40 cc    Hemodynamics:  Opening Aortic pressure: 525/38  LV systolic pressure: 550  LVEDP: 17  No significant gradient across the aortic valve    Angiographic Results/findings:  Left Main: No angiographically significant stenosis. LAD: No angiographically significant stenosis. D1: No angiographically significant stenosis. Cx: Dominant vessel. No angiographically significant stenosis. OM1: No angiographically significant stenosis. OM2: No angiographically significant stenosis. OM 3: (PDA) no angiographically significant stenosis. Ramus: No angiographically significant stenosis. RCA: non-Dominant. No angiographically significant stenosis. LV: Normal LV size and systolic function. No wall motion abnormalities. Ejection fraction 55%    Procedure:   After obtaining informed consent the patient was taken to the cardiac Cath Lab where the area over the right radial artery was prepped and draped in a sterile fashion. Using ultrasound guidance and a micropuncture technique a 6 Guatemalan slender rain sheath was placed in the right radial artery. This was aspirated & flushed several times throughout the procedure. This was medicated with verapamil and nitroglycerin. They were given heparin systemically. A 5 [place] TIG catheter was advanced over a wire to the root of the aorta. It was aspirated & flushed with saline. Pressures were obtained. It was filled with contrast.  This was then manipulated into the left main coronary artery.   4 orthogonal views were obtained. A TIG catheter was then manipulated into the right coronary artery and an Pashto view was obtained. A 5 Armenian angled pigtail was then advanced & manipulated into the left ventricle. This was then aspirated & flushed with saline & pressures were obtained. An BEJARANO view was then obtained. The catheter was then aspirated & flushed with saline once again & pull back pressures were then obtained across the aortic vlave. The right radial artery sheath was removed and a vasc band was then placed with good patent hemostasis. They tolerated the procedure well with no complications. Note: This report was completed using computerized voice recognition software. Every effort has been made to ensure accuracy, however; and invert and computerized transcription errors may be present.

## 2022-06-13 NOTE — CONSULTS
[facility],[address] and [facility] Electrophysiology  Consultation Report  PATIENT: [address] RECORD NUMBER: [id number]  DATE OF SERVICE:  6/13/2022  ATTENDING ELECTROPHYSIOLOGIST: Erasmo Dia MD  PRIMARY ELECTROPHYSIOLOGIST: Erasmo Dia MD   REFERRING PHYSICIAN: Missy Hernandez MD  and Kody Coats MD  CHIEF COMPLAINT: Chest pain. HPI: This is a 60 y.o. male with a history of CKD stage II, hypertension, HIV, chronic hepatitis C with previous treatment at [facility] in 2017. He was not known to cardiology/EP prior to this admission. Over approximately a year he has been complaining of exertional chest pain and dyspnea upon 06/09/2022 after drinking coffee he noted chest tightness and presented to the emergency department. He was seen by cardiology who performed a Lexiscan stress test that was noted to be high risk ( an area of ischemia involving the LAD territory). He was taken off labetalol and placed on carvedilol 25 mg twice daily and echocardiogram was completed that showed his LVEF was 55-60% with a septum of 1.2 cm. Patient denies any prior episodes of syncope or near syncope. His main symptom is that of exertional chest discomfort as noted. He has been followed by Dr. Renata Lal and has not had any prior cardiac work-up. He is also followed by Dr. Sary Grover as well as Dr. Wallace Milligan for his renal insufficiency and HIV status. Patient Active Problem List   Diagnosis    Chronic kidney disease, stage II (mild)    Hypertension    Human immunodeficiency virus (HIV) disease ([facility] [address]    Chronic hepatitis C without hepatic coma ([facility] [address]    Umbilical hernia without obstruction and without gangrene    Heart murmur, systolic    History of colon polyps    Chest pain    Angina of effort [facility])   who presents to the hospital complaining of Chest pain. Cardiac electrophysiology service is consulted for Intermittent CHB.     Patient Active Problem List    Diagnosis Date Noted    Angina of effort [facility])      Priority: Medium    Chest pain 2022     Priority: Medium    History of colon polyps 2018     Overview Note:     2018 colonoscopy - distal ascending tubular adenomas removed with snare polypectomy and biopsy and cauterized, rectal polyp removed with biopsy/cauterization (squamous dysplasia), Dr. Mamta Richmond, [facility], recommended repeat colonoscopy in 1 year      Umbilical hernia without obstruction and without gangrene 2018    Heart murmur, systolic     Hypertension 06/15/2018    Chronic kidney disease, stage II (mild) 2018     Overview Note:     Diagnosis updated to problem list by Licha Guerrero [facility] 2018 1:59 PM, due to new billing requirements, based on transcribed order from Dr. Blank Steen.         Human immunodeficiency virus (HIV) disease ([facility] [address] 10/18/2016    Chronic hepatitis C without hepatic coma ([facility] [address] 10/18/2016       Past Medical History:   Diagnosis Date    Hepatitis C     Had treatment 2017    HIV (human immunodeficiency virus infection) ([facility] [address]     Hypertension        Family History   Problem Relation Age of Onset    High Blood Pressure Father     Diabetes Father     Heart Disease Father        Social History     Tobacco Use    Smoking status: Former Smoker     Packs/day: 0.10     Types: Cigarettes     Start date: 6/15/1979     Quit date: 6/15/1995     Years since quittin.0    Smokeless tobacco: Never Used    Tobacco comment: social smoker   Substance Use Topics    Alcohol use: Yes     Comment: occassionally       Current Facility-Administered Medications   Medication Dose Route Frequency Provider Last Rate Last Admin    0.9 % sodium chloride infusion   IntraVENous Continuous Cassandra Rincon  mL/hr at 22 0353 New Bag at 22 0353    docusate sodium (COLACE) capsule 100 mg  100 mg Oral Nightly Maurice Coleman DO   100 mg at 22    pantoprazole (PROTONIX) tablet 40 mg  40 mg Oral QAM AC Maurice Genell Bal, DO   40 mg at 06/12/22 0510    carvedilol (COREG) tablet 25 mg  25 mg Oral BID MAKI Perdomo MD   25 mg at 06/11/22 1711    perflutren lipid microspheres (DEFINITY) injection 1.65 mg  1.5 mL IntraVENous ONCE PRN Юлия Stacy, APRN - CNP        amLODIPine (NORVASC) tablet 10 mg  10 mg Oral QAM Janora Rear, APRN - CNP   10 mg at 06/13/22 7445    hydrALAZINE (APRESOLINE) tablet 100 mg  100 mg Oral TID Janora Rear, APRN - CNP   100 mg at 06/13/22 2390    Vitamin D (CHOLECALCIFEROL) tablet 2,000 Units  2,000 Units Oral Daily Janora Rear, APRN - CNP   2,000 Units at 06/13/22 6024    sodium chloride flush 0.9 % injection 5-40 mL  5-40 mL IntraVENous 2 times per day Janora Rear, APRN - CNP   10 mL at 06/12/22 0852    sodium chloride flush 0.9 % injection 10 mL  10 mL IntraVENous PRN Janora Rear, APRN - CNP        0.9 % sodium chloride infusion   IntraVENous PRN Janora Rear, APRN - CNP        ondansetron (ZOFRAN-ODT) disintegrating tablet 4 mg  4 mg Oral Q8H PRN Janora Rear, APRN - CNP        Or    ondansetron (ZOFRAN) injection 4 mg  4 mg IntraVENous Q6H PRN Janora Rear, APRN - CNP        acetaminophen (TYLENOL) tablet 650 mg  650 mg Oral Q6H PRN Janora Rear, APRN - CNP        Or    acetaminophen (TYLENOL) suppository 650 mg  650 mg Rectal Q6H PRN Janora Rear, APRN - CNP        magnesium hydroxide (MILK OF MAGNESIA) 400 MG/5ML suspension 30 mL  30 mL Oral Daily PRN Janora Rear, APRN - CNP   30 mL at 06/12/22 1057    aspirin chewable tablet 81 mg  81 mg Oral Daily Janora Rear, APRN - CNP   81 mg at 06/13/22 0832    enoxaparin (LOVENOX) injection 40 mg  40 mg SubCUTAneous Daily Janora Rear, APRN - CNP   40 mg at 06/13/22 0832    atorvastatin (LIPITOR) tablet 40 mg  40 mg Oral Nightly LYRIC Fischer CNP   40 mg at 06/12/22 2119    polyethylene glycol (GLYCOLAX) packet 17 g  17 g Oral Daily LYRIC Fischer - CNP   17 g at 06/12/22 0846    hydrOXYzine pamoate (VISTARIL) capsule 25 mg  25 mg Oral TID PRN Silvia Blanc MD        elvitegravir-cobicistat-emtricitabine-tenofovir alafenamide (GENVOYA) [phone] MG TABLET 1 tablet  1 tablet Oral Nightly Silvia Blanc MD   1 tablet at 06/12/22 2119        Allergies   Allergen Reactions    Penicillins Rash         PHYSICAL EXAM:   Vitals:    06/12/22 2350 06/13/22 0348 06/13/22 0354 06/13/22 0727   BP: 122/80 113/66  129/75   Pulse: (!) 47 (!) 47  55   Resp:  18  20   Temp: 97 °F (36.1 °C) 96.9 °F (36.1 °C)  96.9 °F (36.1 °C)   TempSrc: Temporal Temporal  Temporal   SpO2: 93% 95%  96%   Weight:   198 lb (89.8 kg)    Height:            I have personally reviewed the laboratory, cardiac diagnostic and radiographic testing as outlined below:    Data:    No results for input(s): WBC, HGB, HCT, PLT in the last 72 hours. Recent Labs     06/11/22  0602 06/12/22  0617 06/13/22  0625    142 142   K 3.7 3.8 4.2    108* 108*   CO2 19* 17* 23   BUN 22* 17 12   CREATININE 2.2* 1.8* 1.7*   CALCIUM 9.8 9.0 9.2      No results found for: MG  No results for input(s): TSH in the last 72 hours. No results for input(s): INR in the last 72 hours. CXR 06/09/22: The lungs are without acute focal process.  There is no effusion or   pneumothorax. The cardiomediastinal silhouette is without acute process. The   osseous structures are without acute process.           Impression   No acute process. Telemetry: 2:1 AB block with a LBBB without pauses. Current rate 50 bpm.     EKG in 2020 showed sinus rhythm with LVH--normal QRS duration  EKG 06/12/22, at 8 AM -sinus rhythm with second-degree AV block type II  EKG 6/12/2022 at 6 PM shows sinus rhythm with complete heart block    Echocardiogram 06/11/22:      Left Ventricle   Normal left ventricle size and systolic function. Ejection fraction is visually estimated at 55-60%.    Septal motion consistent with conduction abnormality . Normal left ventricle wall thickness. Indeterminate diastolic function. Right Ventricle   Normal right ventricular size and function. TAPSE 32 mm. Left Atrium   Left atrium is of normal size. Right Atrium   Normal right atrium size. Mitral Valve   Mild mitral annular calcification. No evidence of mitral valve stenosis. Physiologic and/or trace mitral regurgitation is present. Tricuspid Valve   The tricuspid valve appears structurally normal.   Mild tricuspid regurgitation. RVSP is 37 mmHg. Normal estimated PA systolic pressure. Aortic Valve   Structurally normal aortic valve. The aortic valve is trileaflet. No hemodynamically significant aortic stenosis is present. No evidence of aortic valve regurgitation. Pulmonic Valve   Pulmonic valve is structurally normal.   Physiologic and/or trace pulmonic regurgitation present. No evidence of pulmonic valve stenosis. Pericardial Effusion   No evidence for hemodynamically significant pericardial effusion. Pleural Effusion   No evidence of pleural effusion. Aorta   Normal aortic root and ascending aorta. Miscellaneous   Dilated Inferior Vena Cava. Inferior Vena Cava, normal respiratory variation. Conclusions      Summary   Normal left ventricle size and systolic function. Ejection fraction is visually estimated at 55-60%. Septal motion consistent with conduction abnormality . Normal left ventricle wall thickness. Indeterminate diastolic function. Normal right ventricular size and function. TAPSE 32 mm. No hemodynamically significant aortic stenosis is present. Mild tricuspid regurgitation. RVSP is 37 mmHg. Normal estimated PA systolic pressure.    No evidence for hemodynamically significant pericardial effusion    Stress Test 06/11/22:   FINDINGS: The overall quality of the study was good       Left ventricular cavity size was noted to be normal.       Rotational analog analysis demonstrated no patient motion or   extracardiac activity or attenuation artifact.       The gated SPECT stress imaging in the short, vertical long, and   horizontal long axis demonstrated abnormal tracer distribution in the   myocardium.       A severe defect was present in the anterior and anteroseptal wall(s)   that was  large sized by quantification.         The resting images reveal complete reversibility.       Gated SPECT left ventricular ejection fraction was calculated to be   60% with hypokinesis of the anterior wall. TID ratio 1.5.       Low-dose noncontrast chest CT used for attenuation protocol showed   mild coronary artery calcification involving the left main and   proximal LAD.           Impression   1.  ECG during the infusion did not change. 2.  The myocardial perfusion imaging was abnormal   3.  The abnormality was a large sized severe perfusion defect   involving the anterior and anteroseptal walls suggestive ischemia in   the LAD territory. 4.  Overall left ventricular systolic function was normal with   regional wall motion abnormalities. 5.  There is transient ischemic dilatation. 6.  High risk general pharmacologic stress test.       Thank you for [facility] your patient [facility], [facility] cardiology.            Cardiac catheterization on 6/13/2022  Angiographic Results/findings:  Left Main: No angiographically significant stenosis. LAD: No angiographically significant stenosis. D1: No angiographically significant stenosis. Cx: Dominant vessel. No angiographically significant stenosis. OM1: No angiographically significant stenosis. OM2: No angiographically significant stenosis. OM 3: (PDA) no angiographically significant stenosis. Ramus: No angiographically significant stenosis. RCA: non-Dominant. No angiographically significant stenosis. LV: Normal LV size and systolic function.   No wall motion abnormalities. Ejection fraction 55%      I have independently reviewed all of the ECGs and rhythm strips per above     Assessment/Plan:     1. Exertional chest pain with dyspnea on exertion for the past year-normal coronaries    2. New onset left bundle branch block noted this hospitalization. Last EKG was in 2020    3. Intermittent second-degree AV block type II as well as complete heart block noted during this hospitalization  Most likely the cause for exertional dyspnea, possible chest pain    4. CKD stage II     5. Hypertension   - Coreg 25mg BID, Norvasc, hydralazine. 6.  Hep C with prior treatment at [facility]     7. HIV   - Genvoya    Recommendations:    Permanent pacemaker implantation. Treatment of  hypertension as is ongoing    Thank you for allowing me to participate in your patient's care. Please call me if there are any questions or concerns. Discussed with Dr. Danni Montoya. LYRIC Vaca - CNP  Cardiac Electrophysiology  [facility]) Physicians  [facility] [place]  8:40 AM  6/13/2022     I personally and independently saw and examined patient and reviewed all done pertinent laboratory data, imaging studies, ECGs and rhythm strips. I have reviewed and agree with the APRN history and physical exam as documented in the above note. History and physical data as well as assessment and plan were modified by me. 57-year-old patient with the following issues    Hypertension, longstanding  Exertional dyspnea and chest pain  HIV positive  Hepatitis C  Chronic kidney disease  New onset left bundle branch block  Intermittent second-degree AV block type II and complete heart block noted on monitoring in the hospital    Cardiac work-up so far shows normal LV function on echocardiography  No valvular heart disease noted  Normal coronaries by angiography following a positive stress test  ?  Etiology of conduction system abnormality?   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

## 2022-06-13 NOTE — CARE COORDINATION
EP cardio consulted - [facility]. Pt for cardiac cath today. Met with pt briefly in room. Plan remains to home when medically ready. Sw/cm will follow.

## 2022-06-13 NOTE — PROGRESS NOTES
Cardiology    Cardiac catheterization shows no coronary artery disease. Normal LV function. Echocardiogram shows normal LV function and no significant valvular abnormalities. Will defer rhythm to electrophysiology. No further cardiac testing. Cardiology will sign off.

## 2022-06-13 NOTE — PATIENT CARE CONFERENCE
P Quality Flow/Interdisciplinary Rounds Progress Note        Quality Flow Rounds held on June 13, 2022    Disciplines Attending:  Bedside Nurse, ,  and Nursing Unit 61 Desiree Drummond was admitted on 6/9/2022 11:53 AM    Anticipated Discharge Date:  Expected Discharge Date: 06/11/22    Disposition:    Sumit Score:  Sumit Scale Score: 21    Readmission Risk              Risk of Unplanned Readmission:  11           Discussed patient goal for the day, patient clinical progression, and barriers to discharge.   The following Goal(s) of the Day/Commitment(s) have been identified:  have patient ready for CVL by Blaze Allen RN  June 13, 2022

## 2022-06-14 ENCOUNTER — APPOINTMENT (OUTPATIENT)
Dept: MRI IMAGING | Age: 57
DRG: 287 | End: 2022-06-14
Payer: COMMERCIAL

## 2022-06-14 LAB
ANION GAP SERPL CALCULATED.3IONS-SCNC: 18 MMOL/L (ref 7–16)
BUN BLDV-MCNC: 13 MG/DL (ref 6–20)
CALCIUM SERPL-MCNC: 9.3 MG/DL (ref 8.6–10.2)
CHLORIDE BLD-SCNC: 106 MMOL/L (ref 98–107)
CO2: 15 MMOL/L (ref 22–29)
CREAT SERPL-MCNC: 1.6 MG/DL (ref 0.7–1.2)
EKG ATRIAL RATE: 82 BPM
EKG ATRIAL RATE: 84 BPM
EKG ATRIAL RATE: 98 BPM
EKG P AXIS: 34 DEGREES
EKG P AXIS: 38 DEGREES
EKG P AXIS: 9 DEGREES
EKG P-R INTERVAL: 240 MS
EKG P-R INTERVAL: 352 MS
EKG Q-T INTERVAL: 376 MS
EKG Q-T INTERVAL: 464 MS
EKG Q-T INTERVAL: 506 MS
EKG QRS DURATION: 144 MS
EKG QRS DURATION: 158 MS
EKG QRS DURATION: 162 MS
EKG QTC CALCULATION (BAZETT): 414 MS
EKG QTC CALCULATION (BAZETT): 480 MS
EKG QTC CALCULATION (BAZETT): 484 MS
EKG R AXIS: 18 DEGREES
EKG R AXIS: 51 DEGREES
EKG R AXIS: 62 DEGREES
EKG T AXIS: -11 DEGREES
EKG T AXIS: -67 DEGREES
EKG T AXIS: -84 DEGREES
EKG VENTRICULAR RATE: 48 BPM
EKG VENTRICULAR RATE: 55 BPM
EKG VENTRICULAR RATE: 98 BPM
GFR AFRICAN AMERICAN: 54
GFR NON-AFRICAN AMERICAN: 54 ML/MIN/1.73
GLUCOSE BLD-MCNC: 106 MG/DL (ref 74–99)
POTASSIUM SERPL-SCNC: 3.5 MMOL/L (ref 3.5–5)
SODIUM BLD-SCNC: 139 MMOL/L (ref 132–146)

## 2022-06-14 PROCEDURE — 6370000000 HC RX 637 (ALT 250 FOR IP): Performed by: INTERNAL MEDICINE

## 2022-06-14 PROCEDURE — 2580000003 HC RX 258: Performed by: NURSE PRACTITIONER

## 2022-06-14 PROCEDURE — 99232 SBSQ HOSP IP/OBS MODERATE 35: CPT | Performed by: INTERNAL MEDICINE

## 2022-06-14 PROCEDURE — A9579 GAD-BASE MR CONTRAST NOS,1ML: HCPCS | Performed by: RADIOLOGY

## 2022-06-14 PROCEDURE — 6360000004 HC RX CONTRAST MEDICATION: Performed by: RADIOLOGY

## 2022-06-14 PROCEDURE — 80048 BASIC METABOLIC PNL TOTAL CA: CPT

## 2022-06-14 PROCEDURE — 6370000000 HC RX 637 (ALT 250 FOR IP): Performed by: NURSE PRACTITIONER

## 2022-06-14 PROCEDURE — 2580000003 HC RX 258: Performed by: INTERNAL MEDICINE

## 2022-06-14 PROCEDURE — 36415 COLL VENOUS BLD VENIPUNCTURE: CPT

## 2022-06-14 PROCEDURE — 75561 CARDIAC MRI FOR MORPH W/DYE: CPT

## 2022-06-14 PROCEDURE — 2060000000 HC ICU INTERMEDIATE R&B

## 2022-06-14 PROCEDURE — 6360000002 HC RX W HCPCS: Performed by: NURSE PRACTITIONER

## 2022-06-14 RX ORDER — POTASSIUM CHLORIDE 20 MEQ/1
40 TABLET, EXTENDED RELEASE ORAL ONCE
Status: COMPLETED | OUTPATIENT
Start: 2022-06-14 | End: 2022-06-14

## 2022-06-14 RX ORDER — LORAZEPAM 2 MG/ML
0.5 INJECTION INTRAMUSCULAR ONCE
Status: DISCONTINUED | OUTPATIENT
Start: 2022-06-14 | End: 2022-06-16 | Stop reason: HOSPADM

## 2022-06-14 RX ORDER — ALPRAZOLAM 0.25 MG/1
0.5 TABLET ORAL ONCE AS NEEDED
Status: COMPLETED | OUTPATIENT
Start: 2022-06-14 | End: 2022-06-14

## 2022-06-14 RX ADMIN — AMLODIPINE BESYLATE 10 MG: 10 TABLET ORAL at 09:16

## 2022-06-14 RX ADMIN — SODIUM CHLORIDE, PRESERVATIVE FREE 10 ML: 5 INJECTION INTRAVENOUS at 09:17

## 2022-06-14 RX ADMIN — PANTOPRAZOLE SODIUM 40 MG: 40 TABLET, DELAYED RELEASE ORAL at 05:46

## 2022-06-14 RX ADMIN — Medication 2000 UNITS: at 09:16

## 2022-06-14 RX ADMIN — SODIUM CHLORIDE, PRESERVATIVE FREE 10 ML: 5 INJECTION INTRAVENOUS at 20:59

## 2022-06-14 RX ADMIN — POLYETHYLENE GLYCOL 3350 17 G: 17 POWDER, FOR SOLUTION ORAL at 09:24

## 2022-06-14 RX ADMIN — HYDRALAZINE HYDROCHLORIDE 100 MG: 50 TABLET, FILM COATED ORAL at 14:36

## 2022-06-14 RX ADMIN — ENOXAPARIN SODIUM 40 MG: 100 INJECTION SUBCUTANEOUS at 09:16

## 2022-06-14 RX ADMIN — DOCUSATE SODIUM 100 MG: 100 CAPSULE, LIQUID FILLED ORAL at 20:58

## 2022-06-14 RX ADMIN — SODIUM CHLORIDE: 9 INJECTION, SOLUTION INTRAVENOUS at 12:47

## 2022-06-14 RX ADMIN — MAGNESIUM HYDROXIDE 30 ML: 1200 LIQUID ORAL at 10:41

## 2022-06-14 RX ADMIN — HYDRALAZINE HYDROCHLORIDE 100 MG: 50 TABLET, FILM COATED ORAL at 20:59

## 2022-06-14 RX ADMIN — ALPRAZOLAM 0.5 MG: 0.25 TABLET ORAL at 17:42

## 2022-06-14 RX ADMIN — ELVITEGRAVIR, COBICISTAT, EMTRICITABINE, AND TENOFOVIR ALAFENAMIDE 1 TABLET: 150; 150; 200; 10 TABLET ORAL at 20:59

## 2022-06-14 RX ADMIN — GADOTERIDOL 38 ML: 279.3 INJECTION, SOLUTION INTRAVENOUS at 19:15

## 2022-06-14 RX ADMIN — HYDRALAZINE HYDROCHLORIDE 100 MG: 50 TABLET, FILM COATED ORAL at 09:16

## 2022-06-14 RX ADMIN — ASPIRIN 81 MG CHEWABLE TABLET 81 MG: 81 TABLET CHEWABLE at 09:16

## 2022-06-14 RX ADMIN — POTASSIUM CHLORIDE 40 MEQ: 20 TABLET, EXTENDED RELEASE ORAL at 10:41

## 2022-06-14 ASSESSMENT — PAIN SCALES - GENERAL: PAINLEVEL_OUTOF10: 0

## 2022-06-14 NOTE — PROGRESS NOTES
Electrophysiology progress note         Date:  6/14/2022  Patient: Joseph Vora  Admission:  6/9/2022 11:53 AM  Admit DX: Chest pain [R07.9]  Chest pain, unspecified type [R07.9]  Age:  60 y.o., 1965     LOS: 2 days     Reason for evaluation:   Second-degree AV block type II, complete heart block, intermittent      SUBJECTIVE:    The patient was seen and examined. Notes and labs reviewed. There were no complications over night. Patient's cardiac review of systems: negative. The patient is generally feeling unchanged. OBJECTIVE:    Telemetry: Sinus rhythm with 2:1 AV block  BP (!) 146/72   Pulse (!) 48   Temp 98.6 °F (37 °C) (Oral)   Resp 18   Ht 5' 7\" (1.702 m)   Wt 194 lb (88 kg)   SpO2 95%   BMI 30.38 kg/m²     Intake/Output Summary (Last 24 hours) at 6/14/2022 1905  Last data filed at 6/14/2022 0706  Gross per 24 hour   Intake 0 ml   Output --   Net 0 ml       EXAM:   CONSTITUTIONAL:  awake, alert, cooperative, no apparent distress, and appears stated age. HEENT: Normal jugular venous pulsations, . Head is atraumatic, normocephalic. Eyes and oral mucosa are normal.  LUNGS: Good respiratory effort. No for increased work of breathing. On auscultation: clear to auscultation bilaterally  CARDIOVASCULAR:  Normal apical impulse, regular rate and rhythm, normal S1 and S2, no S3 or S4, and no murmur or rub noted. ABDOMEN: Soft, nontender, nondistended. SKIN: Warm and dry. EXTREMITIES: No lower extremity edema. Motor movement grossly intact. No cyanosis or clubbing.     Current Inpatient Medications:   LORazepam  0.5 mg IntraVENous Once    docusate sodium  100 mg Oral Nightly    pantoprazole  40 mg Oral QAM AC    amLODIPine  10 mg Oral QAM    hydrALAZINE  100 mg Oral TID    vitamin D  2,000 Units Oral Daily    sodium chloride flush  5-40 mL IntraVENous 2 times per day    aspirin  81 mg Oral Daily    enoxaparin  40 mg SubCUTAneous Daily    atorvastatin  40 mg Oral Nightly    polyethylene glycol  17 g Oral Daily    elvitegravir-cobicistat-emtricitabine-tenofovir alafenamide  1 tablet Oral Nightly       IV Infusions (if any):   sodium chloride 125 mL/hr at 06/14/22 1247    sodium chloride         Diagnostics:    EKG: normal sinus rhythm, left bundle branch block  Due to 1 AV block  ECHO: reviewed. Ejection fraction: 55%  Stress Test: not obtained. Cardiac Angiography: reviewed. Labs:   CBC: No results for input(s): WBC, HGB, HCT, PLT in the last 72 hours. BMP:   Recent Labs     06/13/22  0625 06/14/22  0602    139   K 4.2 3.5   CO2 23 15*   BUN 12 13   CREATININE 1.7* 1.6*   LABGLOM 50 54   GLUCOSE 103* 106*     BNP: No results for input(s): BNP in the last 72 hours. PT/INR: No results for input(s): PROTIME, INR in the last 72 hours. APTT:No results for input(s): APTT in the last 72 hours. CARDIAC ENZYMES:No results for input(s): CKTOTAL, CKMB, CKMBINDEX, TROPONINI in the last 72 hours. FASTING LIPID PANEL:  Lab Results   Component Value Date    HDL 39 06/10/2022    LDLCALC 98 06/10/2022    TRIG 112 06/10/2022     LIVER PROFILE:No results for input(s): AST, ALT, LABALBU in the last 72 hours. ASSESSMENT:    Patient Active Problem List   Diagnosis    Chronic kidney disease, stage II (mild)      Hypertension      [facility] immunodeficiency virus (HIV) disease ([facility]      Chronic hepatitis C without hepatic coma ([facility]      Chest pain    Angina of effort (HCC)--normal coronaries by cardiac catheterization   Left bundle branch block, new onset as compared to EKG from 2020    Second-degree AV block type II and intermittent complete heart block--most likely cause of symptoms of exertional dyspnea    PLAN:    Pacemaker implantation discussed with patient again today. He is hesitant to proceed with the same.   Cardiac MRI today to assess the cause of his conduction system disease-patient agreeable to proceed with the same  Further recommendations will depend on the results of the above, although pacemaker implantation is imperative before discharge. I will continue to discuss this with the patient. Please see orders. Discussed with patient and nursing.     Karen Tong MD, [address] [facility]

## 2022-06-14 NOTE — PATIENT CARE CONFERENCE
P Quality Flow/Interdisciplinary Rounds Progress Note        Quality Flow Rounds held on June 14, 2022    Disciplines Attending:  Bedside Nurse, ,  and Nursing Unit 61 Desiree Drummond was admitted on 6/9/2022 11:53 AM    Anticipated Discharge Date:  Expected Discharge Date: 06/16/22    Disposition:    Sumit Score:  Sumit Scale Score: 21    Readmission Risk              Risk of Unplanned Readmission:  11           Discussed patient goal for the day, patient clinical progression, and barriers to discharge.   The following Goal(s) of the Day/Commitment(s) have been identified:  Diagnostics - Report Results      [facility] Mikala RN  June 14, 2022

## 2022-06-14 NOTE — PROGRESS NOTES
Hospitalist Progress Note      PCP: James Steinberg MD    Date of Admission: 6/9/2022        Hospital Course:  **60 y. o. male presented with CHEST PAIN, ONSET SEVERAL YEARS  AGO, TIGHTNESS IN CHARACTER, NO NV, SOB OR DIAPHORESIS, POS PALPITATIONS** GIVEN  IMDUR, BECAME HYPOTENSIVE, HAD TO BE GIVEN FLUIDS on 6. 11. That evening he started to feel hypotensive again ** NM scan showed a large sized severe perfusion defect in the LAD territory , *8 CATH UNREMARKABLE FOR CARDIAC MRI   **        Subjective: **WANTS SOMETHING FOR MRI          Medications:  Reviewed    Infusion Medications    sodium chloride 125 mL/hr at 06/14/22 1247    sodium chloride       Scheduled Medications    LORazepam  0.5 mg IntraVENous Once    docusate sodium  100 mg Oral Nightly    pantoprazole  40 mg Oral QAM AC    amLODIPine  10 mg Oral QAM    hydrALAZINE  100 mg Oral TID    vitamin D  2,000 Units Oral Daily    sodium chloride flush  5-40 mL IntraVENous 2 times per day    aspirin  81 mg Oral Daily    enoxaparin  40 mg SubCUTAneous Daily    atorvastatin  40 mg Oral Nightly    polyethylene glycol  17 g Oral Daily    elvitegravir-cobicistat-emtricitabine-tenofovir alafenamide  1 tablet Oral Nightly     PRN Meds: ALPRAZolam, acetaminophen, perflutren lipid microspheres, sodium chloride flush, sodium chloride, ondansetron **OR** ondansetron, magnesium hydroxide, hydrOXYzine pamoate      Intake/Output Summary (Last 24 hours) at 6/14/2022 1534  Last data filed at 6/14/2022 0925  Gross per 24 hour   Intake 0 ml   Output 350 ml   Net -350 ml       Exam:    BP (!) 146/72   Pulse (!) 48   Temp 98.6 °F (37 °C) (Oral)   Resp 18   Ht 5' 7\" (1.702 m)   Wt 194 lb (88 kg)   SpO2 95%   BMI 30.38 kg/m²       General appearance:  No apparent distress  HEENT:  Normal cephalic, atraumatic without obvious deformity. Neck: Supple, with full range of motion.    Respiratory:  CTA  Cardiovascular:  Regular rate and rhythm   Abdomen: Soft, non-tender, non-distended with normal bowel sounds. Musculoskeletal:  No clubbing, cyanosis or edema bilaterally.    Skin: Skin color, texture, turgor normal.  No rashes or lesions. Neurologic:  Neurovascularly intact   Psychiatric:  Alert and oriented, t                Labs:   No results for input(s): WBC, HGB, HCT, PLT in the last 72 hours. Recent Labs     06/12/22  0617 06/13/22  0625 06/14/22  0602    142 139   K 3.8 4.2 3.5   * 108* 106   CO2 17* 23 15*   BUN 17 12 13   CREATININE 1.8* 1.7* 1.6*   CALCIUM 9.0 9.2 9.3     No results for input(s): AST, ALT, BILIDIR, BILITOT, ALKPHOS in the last 72 hours. No results for input(s): INR in the last 72 hours. No results for input(s): Stephanie Johanny in the last 72 hours. No results for input(s): AST, ALT, ALB, BILIDIR, BILITOT, ALKPHOS in the last 72 hours. No results for input(s): LACTA in the last 72 hours. No results found for: Melody Anguianocarmen  No results found for: AMMONIA    Assessment:    Active Hospital Problems    Diagnosis Date Noted    Angina of effort ([facility] [I20.8]      Priority: Medium    Chest pain [R07.9] 06/09/2022     Priority: Medium   HYPOTENSIVE EPISODE AFTER IMDUR  HEPATITIS C   HIV   HTN   CKD 2        PLAN:  Tristin Kulwant   NORVASC   HYDRALAZINE   GENVOYA  ASPIRIN   FLUIDS   CARDIAC MRI     DVT Prophylaxis: *LOVENOX  Diet: ADULT DIET; Regular;  Low Sodium (2 gm)  Diet NPO Exceptions are: Sips of Water with Meds  Code Status: Full Code     PT/OT Eval Status: *ORDERED     Dispo - *HOME        Electronically signed by Avinash Fields DO on 6/14/2022 at 3:34 PM [facility]

## 2022-06-14 NOTE — PLAN OF CARE
Problem: Discharge Planning  Goal: Discharge to home or other facility with appropriate resources  Outcome: Progressing  Flowsheets (Taken 6/14/2022 0927 by Edelmira Baer RN)  Discharge to home or other facility with appropriate resources: Identify barriers to discharge with patient and caregiver     Problem: Pain  Goal: Verbalizes/displays adequate comfort level or baseline comfort level  Outcome: Progressing  Flowsheets (Taken 6/14/2022 0925 by Edelmira Baer RN)  Verbalizes/displays adequate comfort level or baseline comfort level: Encourage patient to monitor pain and request assistance     Problem: Pain  Goal: Verbalizes/displays adequate comfort level or baseline comfort level  Outcome: Progressing  Flowsheets (Taken 6/14/2022 0925 by Edelmira Baer RN)  Verbalizes/displays adequate comfort level or baseline comfort level: Encourage patient to monitor pain and request assistance     Problem: Safety - Adult  Goal: Free from fall injury  Outcome: Progressing

## 2022-06-15 LAB
ANION GAP SERPL CALCULATED.3IONS-SCNC: 13 MMOL/L (ref 7–16)
BUN BLDV-MCNC: 16 MG/DL (ref 6–20)
CALCIUM SERPL-MCNC: 9.9 MG/DL (ref 8.6–10.2)
CHLORIDE BLD-SCNC: 105 MMOL/L (ref 98–107)
CO2: 18 MMOL/L (ref 22–29)
CREAT SERPL-MCNC: 1.7 MG/DL (ref 0.7–1.2)
GFR AFRICAN AMERICAN: 50
GFR NON-AFRICAN AMERICAN: 50 ML/MIN/1.73
GLUCOSE BLD-MCNC: 89 MG/DL (ref 74–99)
HCT VFR BLD CALC: 40.3 % (ref 37–54)
HEMOGLOBIN: 13.3 G/DL (ref 12.5–16.5)
LV EF: 58 %
LVEF MODALITY: NORMAL
MAGNESIUM: 2.3 MG/DL (ref 1.6–2.6)
MCH RBC QN AUTO: 32.4 PG (ref 26–35)
MCHC RBC AUTO-ENTMCNC: 33 % (ref 32–34.5)
MCV RBC AUTO: 98.1 FL (ref 80–99.9)
PDW BLD-RTO: 12.6 FL (ref 11.5–15)
PLATELET # BLD: 221 E9/L (ref 130–450)
PMV BLD AUTO: 11 FL (ref 7–12)
POTASSIUM SERPL-SCNC: 4 MMOL/L (ref 3.5–5)
RBC # BLD: 4.11 E12/L (ref 3.8–5.8)
SODIUM BLD-SCNC: 136 MMOL/L (ref 132–146)
WBC # BLD: 5.4 E9/L (ref 4.5–11.5)

## 2022-06-15 PROCEDURE — 6370000000 HC RX 637 (ALT 250 FOR IP): Performed by: INTERNAL MEDICINE

## 2022-06-15 PROCEDURE — 36415 COLL VENOUS BLD VENIPUNCTURE: CPT

## 2022-06-15 PROCEDURE — 6370000000 HC RX 637 (ALT 250 FOR IP): Performed by: NURSE PRACTITIONER

## 2022-06-15 PROCEDURE — 75561 CARDIAC MRI FOR MORPH W/DYE: CPT | Performed by: INTERNAL MEDICINE

## 2022-06-15 PROCEDURE — 2580000003 HC RX 258: Performed by: NURSE PRACTITIONER

## 2022-06-15 PROCEDURE — 99233 SBSQ HOSP IP/OBS HIGH 50: CPT | Performed by: INTERNAL MEDICINE

## 2022-06-15 PROCEDURE — 83735 ASSAY OF MAGNESIUM: CPT

## 2022-06-15 PROCEDURE — 85027 COMPLETE CBC AUTOMATED: CPT

## 2022-06-15 PROCEDURE — 2060000000 HC ICU INTERMEDIATE R&B

## 2022-06-15 PROCEDURE — 80048 BASIC METABOLIC PNL TOTAL CA: CPT

## 2022-06-15 RX ORDER — LABETALOL 100 MG/1
50 TABLET, FILM COATED ORAL EVERY 12 HOURS SCHEDULED
Status: DISCONTINUED | OUTPATIENT
Start: 2022-06-15 | End: 2022-06-16

## 2022-06-15 RX ADMIN — Medication 2000 UNITS: at 09:17

## 2022-06-15 RX ADMIN — AMLODIPINE BESYLATE 10 MG: 10 TABLET ORAL at 09:16

## 2022-06-15 RX ADMIN — ASPIRIN 81 MG CHEWABLE TABLET 81 MG: 81 TABLET CHEWABLE at 09:16

## 2022-06-15 RX ADMIN — LABETALOL HYDROCHLORIDE 50 MG: 100 TABLET, FILM COATED ORAL at 10:46

## 2022-06-15 RX ADMIN — HYDRALAZINE HYDROCHLORIDE 100 MG: 50 TABLET, FILM COATED ORAL at 09:16

## 2022-06-15 RX ADMIN — LABETALOL HYDROCHLORIDE 50 MG: 100 TABLET, FILM COATED ORAL at 20:30

## 2022-06-15 RX ADMIN — HYDRALAZINE HYDROCHLORIDE 100 MG: 50 TABLET, FILM COATED ORAL at 20:30

## 2022-06-15 RX ADMIN — POLYETHYLENE GLYCOL 3350 17 G: 17 POWDER, FOR SOLUTION ORAL at 09:16

## 2022-06-15 RX ADMIN — ELVITEGRAVIR, COBICISTAT, EMTRICITABINE, AND TENOFOVIR ALAFENAMIDE 1 TABLET: 150; 150; 200; 10 TABLET ORAL at 20:30

## 2022-06-15 RX ADMIN — SODIUM CHLORIDE, PRESERVATIVE FREE 10 ML: 5 INJECTION INTRAVENOUS at 20:30

## 2022-06-15 RX ADMIN — SODIUM CHLORIDE, PRESERVATIVE FREE 10 ML: 5 INJECTION INTRAVENOUS at 09:15

## 2022-06-15 ASSESSMENT — PAIN SCALES - GENERAL
PAINLEVEL_OUTOF10: 0

## 2022-06-15 NOTE — CARE COORDINATION
Met with pt in room. Cardiac mri results on chart. Pt stated he did not want a pacemaker. Physicians aware. Plan remains to home when medically ready. Denies any needs for home. Sw/cm will follow.

## 2022-06-15 NOTE — PLAN OF CARE
Problem: Discharge Planning  Goal: Discharge to home or other facility with appropriate resources  6/15/2022 1230 by Erika Galeazzi, RN  Outcome: Progressing  6/14/2022 2300 by Eulalio Curling, RN  Outcome: Progressing     Problem: Pain  Goal: Verbalizes/displays adequate comfort level or baseline comfort level  6/15/2022 1230 by Erika Galeazzi, RN  Outcome: Progressing  6/14/2022 2300 by Eulalio Curling, RN  Outcome: Progressing     Problem: Safety - Adult  Goal: Free from fall injury  6/15/2022 1230 by Erika Galeazzi, RN  Outcome: Progressing  6/14/2022 2300 by Eulalio Curling, RN  Outcome: Progressing     Problem: ABCDS Injury Assessment  Goal: Absence of physical injury  6/15/2022 1230 by Erika Galeazzi, RN  Outcome: Progressing  6/14/2022 2300 by Eulalio Curling, RN  Outcome: Progressing

## 2022-06-15 NOTE — PATIENT CARE CONFERENCE
P Quality Flow/Interdisciplinary Rounds Progress Note        Quality Flow Rounds held on Bhavna 15, 2022    Disciplines Attending:  Bedside Nurse, ,  and Nursing Unit 61 Desiree Drummond was admitted on 6/9/2022 11:53 AM    Anticipated Discharge Date:  Expected Discharge Date: 06/16/22    Disposition:    Sumit Score:  Sumit Scale Score: 23    Readmission Risk              Risk of Unplanned Readmission:  10           Discussed patient goal for the day, patient clinical progression, and barriers to discharge.   The following Goal(s) of the Day/Commitment(s) have been identified:  Diagnostics - Report Results and Labs - Report Results      Nini Edge RN  Bhavna 15, 2022

## 2022-06-15 NOTE — PLAN OF CARE
Problem: Discharge Planning  Goal: Discharge to home or other facility with appropriate resources  6/14/2022 2300 by Dagoberto Almendarez RN  Outcome: Progressing  6/14/2022 1812 by Panchito Smith RN  Outcome: Progressing  Flowsheets (Taken 6/14/2022 0940 by Ciara Nance RN)  Discharge to home or other facility with appropriate resources: Identify barriers to discharge with patient and caregiver

## 2022-06-15 NOTE — PROGRESS NOTES
ELECTROPHYSIOLOGY PROGRESS NOTE    Scout Alonzo  1965  Date of Service: 6/15/2022  PCP: Zeke Head MD  Primary Electrophysiologist: Yon Mart  Chief Complaint: Chest pain and dyspnea with physical activity  Intermittent second-degree AV block and complete heart block on hospitalization        Subjective: Scout Alonzo is seen in hospital follow-up.      Patient Active Problem List   Diagnosis    Chronic kidney disease, stage II (mild)    Hypertension    [facility] immunodeficiency virus ([facility]) disease [facility]    Chronic hepatitis C without hepatic coma ([facility] [facility]    Umbilical hernia without obstruction and without gangrene    Heart murmur, systolic    History of colon polyps    Chest pain    Angina of effort (HCC)         Scheduled Medications:   labetalol  50 mg Oral 2 times per day    LORazepam  0.5 mg IntraVENous Once    docusate sodium  100 mg Oral Nightly    pantoprazole  40 mg Oral QAM AC    amLODIPine  10 mg Oral QAM    hydrALAZINE  100 mg Oral TID    vitamin D  2,000 Units Oral Daily    sodium chloride flush  5-40 mL IntraVENous 2 times per day    aspirin  81 mg Oral Daily    atorvastatin  40 mg Oral Nightly    polyethylene glycol  17 g Oral Daily    elvitegravir-cobicistat-emtricitabine-tenofovir alafenamide  1 tablet Oral Nightly       Infusion Medications:   sodium chloride         PRN Medications:  acetaminophen, perflutren lipid microspheres, sodium chloride flush, sodium chloride, ondansetron **OR** ondansetron, magnesium hydroxide, hydrOXYzine pamoate    Allergies   Allergen Reactions    Penicillins Rash       Wt Readings from Last 3 Encounters:   06/15/22 195 lb 9.6 oz (88.7 kg)   06/18/21 199 lb (90.3 kg)   04/29/21 199 lb (90.3 kg)     Temp Readings from Last 3 Encounters:   06/15/22 97.8 °F (36.6 °C) (Temporal)   06/18/21 98.3 °F (36.8 °C)   04/29/21 99 °F (37.2 °C) (Oral)     BP Readings from Last 3 Encounters:   06/15/22 131/83   06/18/21 132/70   06/18/21 96/63 Pulse Readings from Last 3 Encounters:   06/15/22 (!) 48   06/18/21 79   04/29/21 93         Intake/Output Summary (Last 24 hours) at 6/15/2022 0913  Last data filed at 6/14/2022 2153  Gross per 24 hour   Intake 340 ml   Output --   Net 340 ml          Physical exam:  Constitutional: /83   Pulse (!) 48   Temp 97.8 °F (36.6 °C) (Temporal)   Resp 18   Ht 5' 7\" (1.702 m)   Wt 195 lb 9.6 oz (88.7 kg)   SpO2 100%   BMI 30.64 kg/m²  well developed, in no acute distress   Eyes: conjunctivae normal, no xanthelasma   Ears, Nose, Throat: oral mucosa moist, no cyanosis   Neck: supple, no JVD, no bruits, no thyromegaly   CV: regular rate & rhythm, normal S1 & S2, No S3 or S4. No murmurs, rubs, or gallops. Peripheral pulses normal   Lungs: clear to auscultation bilaterally, normal respiratory effort   Abdomen: soft, non-tender, bowel sounds present, no masses or hepatomegaly   Extremities: no digital clubbing, no edema   Skin: warm, no rashes   Neuro/Psych: A&O x 3, normal mood and affect    ECG: Sinus rhythm with 2-1 AV block    Rhythm strip review: Sinus rhythm at 100 bpm with complete heart block    Cardiac MRI today    Impression           1. Visually, the left ventricle is normal in size and systolic   function with estimated left ventricular ejection fraction of 55-60%.       2. There is mild concentric left ventricular hypertrophy with   posterior and septal wall thickness of 1.2 cm.       3. Visually, the right ventricle is normal in size and systolic   function       4. There is no definite evidence of significant delay gadolinium   enhancement to suggest myocardial scar or fibrosis.          Recent Labs     06/15/22  0531   WBC 5.4   HGB 13.3   HCT 40.3   MCV 98.1        Recent Labs     06/13/22  0625 06/14/22  0602 06/15/22  0531    139 136   K 4.2 3.5 4.0   * 106 105   CO2 23 15* 18*   BUN 12 13 16   CREATININE 1.7* 1.6* 1.7*     No results for input(s): PROTIME, INR in the last 72 hours. No results for input(s): CKTOTAL, CKMB, CKMBINDEX, TROPONINI in the last 72 hours. No results for input(s): BNP in the last 72 hours. No results for input(s): CHOL, HDL, TRIG in the last 72 hours. Invalid input(s): CHOLHDLR, LDLCALCU  PT/INR:    Lab Results   Component Value Date    PROTIME 11.4 06/09/2022    INR 1.1 06/09/2022       Impression:    53-year-old patient with the following issues    1. Presentation to the hospital with chest pain and dyspnea  Normal echocardiogram, coronary angiography and cardiac MRI    2. Left bundle branch block, new. Last EKG from 2020 reveals LVH but no LBBB     3. Second-degree AV block type II and intermittent complete heart block with sinus rate of 100/min  Pacemaker implantation discussed with patient. He absolutely refuses    4. Chronic HIV disease. Patient is on treatment for the same long-term    5. Hepatitis C     6. Chronic kidney disease-serum creatinine around 1.7 this admission    Recommendations:     Permanent pacemaker implantation discussed with the patient at length again today. I explained his other cardiac work-up to him in detail. Risk of sudden death or syncope with his conduction system disorder discussed at length. The patient's mother was on the telephone during this discussion. I also offered a second opinion at the [facility] and transferred to the [facility] if he wishes. The patient does not want a second opinion and adamantly refuses a pacemaker understanding the risk of death. Risks and benefits of a pacemaker implant were explained as mentioned in detail    Thank you for allowing me the opportunity to participate in the care of your patient.      Todd 298  Cardiac Electrophysiology  6/15/2022

## 2022-06-16 VITALS
HEIGHT: 67 IN | DIASTOLIC BLOOD PRESSURE: 71 MMHG | RESPIRATION RATE: 18 BRPM | BODY MASS INDEX: 30.54 KG/M2 | WEIGHT: 194.6 LBS | HEART RATE: 46 BPM | SYSTOLIC BLOOD PRESSURE: 118 MMHG | OXYGEN SATURATION: 98 % | TEMPERATURE: 98.2 F

## 2022-06-16 LAB
ANION GAP SERPL CALCULATED.3IONS-SCNC: 15 MMOL/L (ref 7–16)
BUN BLDV-MCNC: 17 MG/DL (ref 6–20)
CALCIUM SERPL-MCNC: 9.5 MG/DL (ref 8.6–10.2)
CHLORIDE BLD-SCNC: 104 MMOL/L (ref 98–107)
CO2: 19 MMOL/L (ref 22–29)
CREAT SERPL-MCNC: 1.8 MG/DL (ref 0.7–1.2)
GFR AFRICAN AMERICAN: 47
GFR NON-AFRICAN AMERICAN: 47 ML/MIN/1.73
GLUCOSE BLD-MCNC: 117 MG/DL (ref 74–99)
HCT VFR BLD CALC: 36.9 % (ref 37–54)
HEMOGLOBIN: 12.4 G/DL (ref 12.5–16.5)
MCH RBC QN AUTO: 32.2 PG (ref 26–35)
MCHC RBC AUTO-ENTMCNC: 33.6 % (ref 32–34.5)
MCV RBC AUTO: 95.8 FL (ref 80–99.9)
PDW BLD-RTO: 12.6 FL (ref 11.5–15)
PLATELET # BLD: 211 E9/L (ref 130–450)
PMV BLD AUTO: 10.6 FL (ref 7–12)
POTASSIUM SERPL-SCNC: 3.8 MMOL/L (ref 3.5–5)
RBC # BLD: 3.85 E12/L (ref 3.8–5.8)
SODIUM BLD-SCNC: 138 MMOL/L (ref 132–146)
WBC # BLD: 5.1 E9/L (ref 4.5–11.5)

## 2022-06-16 PROCEDURE — 6370000000 HC RX 637 (ALT 250 FOR IP): Performed by: NURSE PRACTITIONER

## 2022-06-16 PROCEDURE — 2580000003 HC RX 258: Performed by: NURSE PRACTITIONER

## 2022-06-16 PROCEDURE — 6370000000 HC RX 637 (ALT 250 FOR IP): Performed by: INTERNAL MEDICINE

## 2022-06-16 PROCEDURE — 36415 COLL VENOUS BLD VENIPUNCTURE: CPT

## 2022-06-16 PROCEDURE — 80048 BASIC METABOLIC PNL TOTAL CA: CPT

## 2022-06-16 PROCEDURE — 85027 COMPLETE CBC AUTOMATED: CPT

## 2022-06-16 RX ORDER — ATORVASTATIN CALCIUM 40 MG/1
40 TABLET, FILM COATED ORAL NIGHTLY
Qty: 30 TABLET | Refills: 3 | Status: ON HOLD | OUTPATIENT
Start: 2022-06-17 | End: 2022-07-02 | Stop reason: HOSPADM

## 2022-06-16 RX ORDER — ASPIRIN 81 MG/1
81 TABLET, CHEWABLE ORAL DAILY
Qty: 30 TABLET | Refills: 3 | Status: SHIPPED | OUTPATIENT
Start: 2022-06-17

## 2022-06-16 RX ORDER — LABETALOL 100 MG/1
100 TABLET, FILM COATED ORAL EVERY 12 HOURS SCHEDULED
Status: DISCONTINUED | OUTPATIENT
Start: 2022-06-16 | End: 2022-06-16 | Stop reason: HOSPADM

## 2022-06-16 RX ADMIN — PANTOPRAZOLE SODIUM 40 MG: 40 TABLET, DELAYED RELEASE ORAL at 06:17

## 2022-06-16 RX ADMIN — AMLODIPINE BESYLATE 10 MG: 10 TABLET ORAL at 09:36

## 2022-06-16 RX ADMIN — ASPIRIN 81 MG CHEWABLE TABLET 81 MG: 81 TABLET CHEWABLE at 09:38

## 2022-06-16 RX ADMIN — Medication 2000 UNITS: at 09:38

## 2022-06-16 RX ADMIN — SODIUM CHLORIDE, PRESERVATIVE FREE 10 ML: 5 INJECTION INTRAVENOUS at 09:38

## 2022-06-16 RX ADMIN — LABETALOL HYDROCHLORIDE 100 MG: 100 TABLET, FILM COATED ORAL at 09:36

## 2022-06-16 ASSESSMENT — PAIN SCALES - GENERAL: PAINLEVEL_OUTOF10: 0

## 2022-06-16 NOTE — PROGRESS NOTES
Spoke with patient in detail regarding EP's recommendation for pacemaker, explained to patient that since this is highly recommended they cannot sign off the case. Patient informed that if he wants to leave it would be against medical advice, patient states he wishes to sign out against medical advice. Dr. Amin Mini aware.

## 2022-06-16 NOTE — PATIENT CARE CONFERENCE
P Quality Flow/Interdisciplinary Rounds Progress Note        Quality Flow Rounds held on June 16, 2022    Disciplines Attending:  Bedside Nurse, ,  and Nursing Unit 61 Desiree Drummond was admitted on 6/9/2022 11:53 AM    Anticipated Discharge Date:  Expected Discharge Date: 06/16/22    Disposition:    Sumit Score:  Sumit Scale Score: 23    Readmission Risk              Risk of Unplanned Readmission:  11           Discussed patient goal for the day, patient clinical progression, and barriers to discharge.   The following Goal(s) of the Day/Commitment(s) have been identified:  Discharge planning/safety       Alejandro Ocampo RN  June 16, 2022

## 2022-06-16 NOTE — PLAN OF CARE
Problem: Discharge Planning  Goal: Discharge to home or other facility with appropriate resources  6/15/2022 2325 by Ula Lanes, RN  Outcome: Progressing  6/15/2022 1230 by Kristen Shepherd RN  Outcome: Progressing     Problem: Pain  Goal: Verbalizes/displays adequate comfort level or baseline comfort level  6/15/2022 2325 by Ula Lanes, RN  Outcome: Progressing  Flowsheets (Taken 6/15/2022 2315)  Verbalizes/displays adequate comfort level or baseline comfort level: Encourage patient to monitor pain and request assistance  6/15/2022 1230 by Kristen Shepherd RN  Outcome: Progressing     Problem: Safety - Adult  Goal: Free from fall injury  6/15/2022 2325 by Ula Lanes, RN  Outcome: Progressing  6/15/2022 1230 by Kristen Shepherd RN  Outcome: Progressing     Problem: ABCDS Injury Assessment  Goal: Absence of physical injury  6/15/2022 2325 by Ula Lanes, RN  Outcome: Progressing  6/15/2022 1230 by Kristen Shepherd RN  Outcome: Progressing

## 2022-06-16 NOTE — PROGRESS NOTES
Hospitalist Progress Note      PCP: Bart Nathan MD    Date of Admission: 6/9/2022        Hospital Course:  **60 y. o. male presented with CHEST PAIN, ONSET SEVERAL YEARS  AGO, TIGHTNESS IN CHARACTER, NO NV, SOB OR DIAPHORESIS, POS PALPITATIONS** GIVEN  IMDUR, BECAME HYPOTENSIVE, HAD TO BE GIVEN FLUIDS on 6. 11.  That evening he started to feel hypotensive again ** NM scan showed a large sized severe perfusion defect in the LAD territory , * CATH UNREMARKABLE FOR CARDIAC MRI   ** TOLD THAT HE NEEDS A PACER, HE IS REFUSING AFTER NUMEROUS PLEAS AND EXPLAINATIONS  AS TO WHAT CAN HAPPEN           Subjective: WANTS TO LEAVE AMA          Medications:  Reviewed    Infusion Medications    sodium chloride       Scheduled Medications    labetalol  100 mg Oral 2 times per day    LORazepam  0.5 mg IntraVENous Once    docusate sodium  100 mg Oral Nightly    pantoprazole  40 mg Oral QAM AC    amLODIPine  10 mg Oral QAM    hydrALAZINE  100 mg Oral TID    vitamin D  2,000 Units Oral Daily    sodium chloride flush  5-40 mL IntraVENous 2 times per day    aspirin  81 mg Oral Daily    atorvastatin  40 mg Oral Nightly    polyethylene glycol  17 g Oral Daily    elvitegravir-cobicistat-emtricitabine-tenofovir alafenamide  1 tablet Oral Nightly     PRN Meds: acetaminophen, perflutren lipid microspheres, sodium chloride flush, sodium chloride, ondansetron **OR** ondansetron, magnesium hydroxide, hydrOXYzine pamoate      Intake/Output Summary (Last 24 hours) at 6/16/2022 1721  Last data filed at 6/16/2022 1347  Gross per 24 hour   Intake 1020 ml   Output 450 ml   Net 570 ml       Exam:    /71   Pulse (!) 46   Temp 98.2 °F (36.8 °C) (Temporal)   Resp 18   Ht 5' 7\" (1.702 m)   Wt 194 lb 9.6 oz (88.3 kg)   SpO2 98%   BMI 30.48 kg/m²       General appearance:  No apparent distress  HEENT:  Normal cephalic, atraumatic without obvious deformity. Neck: Supple, with full range of motion. Respiratory:  CTA  Cardiovascular:  Regular rate and rhythm   Abdomen: Soft, non-tender, non-distended with normal bowel sounds. Musculoskeletal:  No clubbing, cyanosis or edema bilaterally.    Skin: Skin color, texture, turgor normal.  No rashes or lesions. Neurologic:  Neurovascularly intact   Psychiatric:  Alert and oriented,                       Labs:   Recent Labs     06/15/22  0531 06/16/22  0516   WBC 5.4 5.1   HGB 13.3 12.4*   HCT 40.3 36.9*    211     Recent Labs     06/14/22  0602 06/15/22  0531 06/16/22  0516    136 138   K 3.5 4.0 3.8    105 104   CO2 15* 18* 19*   BUN 13 16 17   CREATININE 1.6* 1.7* 1.8*   CALCIUM 9.3 9.9 9.5     No results for input(s): AST, ALT, BILIDIR, BILITOT, ALKPHOS in the last 72 hours. No results for input(s): INR in the last 72 hours. No results for input(s): Edilia Comment in the last 72 hours. No results for input(s): AST, ALT, ALB, BILIDIR, BILITOT, ALKPHOS in the last 72 hours. No results for input(s): LACTA in the last 72 hours.   No results found for: Farrel Bollard  No results found for: AMMONIA    Assessment:    Active Hospital Problems    Diagnosis Date Noted    Angina of effort [facility]) [I20.8]      Priority: Medium    Chest pain [R07.9] 06/09/2022     Priority: Medium   HYPOTENSIVE EPISODE AFTER IMDUR  HEPATITIS C   HIV   HTN   CKD 2  Second-degree AV block type II and intermittent complete heart block with sinus rate of 100/min  LBBB  Plan:  **HOME AMA  Electronically signed by Jessica Kuhn DO on 6/16/2022 at 5:21 PM [facility]

## 2022-06-16 NOTE — PLAN OF CARE
Problem: Safety - Adult  Goal: Free from fall injury  Recent Flowsheet Documentation  Taken 6/16/2022 1109 by Milla Pulliam RN  Free From Fall Injury: Instruct family/caregiver on patient safety     Problem: Pain  Goal: Verbalizes/displays adequate comfort level or baseline comfort level  Outcome: Progressing     Problem: ABCDS Injury Assessment  Goal: Absence of physical injury  Recent Flowsheet Documentation  Taken 6/16/2022 1109 by Milla Pulliam RN  Absence of Physical Injury: Implement safety measures based on patient assessment

## 2022-06-16 NOTE — PROGRESS NOTES
Dr Maximus Mendoza phoned in to unit to check on patient. Patient still not agreeable to pacer at this time. New order obtained to increase Labetalol to 100mg BID. Natalia Glass RN

## 2022-06-17 ENCOUNTER — TELEPHONE (OUTPATIENT)
Dept: CARDIOLOGY CLINIC | Age: 57
End: 2022-06-17

## 2022-06-21 NOTE — TELEPHONE ENCOUNTER
Left message for patient to contact office. Patient to be scheduled for HFU with Dr. Faith Almonte in September 2022.

## 2022-06-28 NOTE — DISCHARGE SUMMARY
Hospitalist Discharge Summary    Patient ID: Caitlin Dominguez   Patient : 1965  Patient's PCP: Jeannette Grande MD    Admit Date: 2022   Admitting Physician: Ed Hyatt DO    Discharge Date:  2022   Discharge Physician: Ed Hyatt DO   Discharge Condition: Unstable  Discharge Disposition: AMA      Discharge Diagnoses: Active Hospital Problems    Diagnosis Date Noted    Angina of effort [facility]) [I20.8]      Priority: Medium    Chest pain [R07.9] 2022     Priority: Medium   HYPOTENSIVE EPISODE AFTER IMDUR  HEPATITIS C   HIV   HTN   CKD 2  Second-degree AV block type II and intermittent complete heart block with sinus rate of 100/min  LBBB        Hospital course in brief:  *60 y. o. male presented with CHEST PAIN, ONSET SEVERAL YEARS  AGO, TIGHTNESS IN CHARACTER, NO NV, SOB OR DIAPHORESIS, POS PALPITATIONS** GIVEN  IMDUR, BECAME HYPOTENSIVE, HAD TO BE GIVEN FLUIDS on .  That evening he started to feel hypotensive again ** NM scan showed a large sized severe perfusion defect in the LAD territory , * CATH UNREMARKABLE FOR CARDIAC MRI   ** TOLD THAT HE NEEDS A PACER, HE IS REFUSING AFTER NUMEROUS  [rpfessional EXPLAINATIONS  AS TO WHAT CAN HAPPEN he decided to leave [place].           PHYSICAL EXAM:    /71   Pulse (!) 46   Temp 98.2 °F (36.8 °C) (Temporal)   Resp 18   Ht 5' 7\" (1.702 m)   Wt 194 lb 9.6 oz (88.3 kg)   SpO2 98%   BMI 30.48 kg/m²   General appearance:  No apparent distress  HEENT:  Normal cephalic, atraumatic without obvious deformity. Neck: Supple, with full range of motion. Respiratory:  CTA  Cardiovascular:  Regular rate and rhythm   Abdomen: Soft, non-tender, non-distended with normal bowel sounds. Musculoskeletal:  No clubbing, cyanosis or edema bilaterally.    Skin: Skin color, texture, turgor normal.  No rashes or lesions.   Neurologic:  Neurovascularly intact   Psychiatric:  Alert and oriented,          Prior to Admission medications Medication Sig Start Date End Date Taking? Authorizing Provider   atorvastatin (LIPITOR) 40 MG tablet Take 1 tablet by mouth nightly 6/17/22  Yes Kiki Garvin, DO   aspirin 81 MG chewable tablet Take 1 tablet by mouth daily 6/17/22  Yes Leila Arreola, DO   labetalol (NORMODYNE) 100 MG tablet Take 100 mg by mouth 2 times daily  4/23/21   Historical Provider, MD CarreonXacrdyq-Sppss-Oknsvatb-TenofAF (GENVOYA) [phone] MG TABS Take 1 tablet by mouth every evening    Historical Provider, MD   Vitamin D, Ergocalciferol, 50 MCG (2000 UT) CAPS Take 2,000 Units by mouth daily daily 11/8/18   Historical Provider, MD   amLODIPine (NORVASC) 10 MG tablet Take 10 mg by mouth every morning     Historical Provider, MD   hydrALAZINE (APRESOLINE) 100 MG tablet Take 100 mg by mouth 3 times daily     Historical Provider, MD       Consults:   [address] [place] [address] TO CARDIOLOGY  IP CONSULT TO ELECTROPHYSIOLOGY            Discharge Instructions / Follow up:    No future appointments. Continued appropriate risk factor modification of blood pressure, diabetes and serum lipids will remain essential to reducing risk of future atherosclerotic development    Activity: activity as tolerated    Significant labs:  CBC:   No results for input(s): WBC, RBC, HGB, HCT, MCV, RDW, PLT in the last 72 hours. BMP: No results for input(s): NA, K, CL, CO2, BUN, CREATININE, CA, MG, PHOS in the last 72 hours. LFT:  No results for input(s): PROT, ALB, ALKPHOS, ALT, AST, BILITOT, AMYLASE, LIPASE in the last 72 hours. PT/INR: No results for input(s): INR, APTT in the last 72 hours. BNP: No results for input(s): BNP in the last 72 hours.   Hgb A1C:   Lab Results   Component Value Date    LABA1C 4.8 06/10/2022     Folate and B12: No results found for: Quan Clemens, No results found for: FOLATE  Thyroid Studies: No results found for: TSH, M6NMWUY, L2LOSIO, THYROIDAB    Urinalysis:  No results found for: Ashu Blackwood, BACTERIA, RBCUA, BLOODU, SPECGRAV, GLUCOSEU    Imaging:  Echo Complete    Result Date: 6/11/2022  Transthoracic Echocardiography Report (TTE)  Demographics   Patient Name    Lyla Villa Gender            Male                  C   Medical Record  [id number]      Room Number       2720  Number   Account #       [de-identified]     Procedure Date    06/11/2022   Corporate ID                  Ordering          Chidi Ortega MD                                Physician   Accession       [id number]    Referring  Number                        Physician   Date of Birth   1965    Sonographer       Srinivas Johnson RDCS   Age             60 year(s)    Interpreting      [address]                                Physician         Physician Cardiology                                                  Chava Braswell MD                                 Any Other  Procedure Type of Study   TTE procedure   Non-Imaging Stress Test:STRESS TEST W PHARM. Procedure Date Date: 06/11/2022 Start: 02:30 PM Study Location: Portable Technical Quality: Adequate visualization Indications:LV function. Patient Status: Routine Height: 67 inches Weight: 196 pounds BSA: 2 m^2 BMI: 30.7 kg/m^2 BP: 123/88 mmHg  Findings   Left Ventricle  Normal left ventricle size and systolic function. Ejection fraction is visually estimated at 55-60%. Septal motion consistent with conduction abnormality . Normal left ventricle wall thickness. Indeterminate diastolic function. Right Ventricle  Normal right ventricular size and function. TAPSE 32 mm. Left Atrium  Left atrium is of normal size. Right Atrium  Normal right atrium size. Mitral Valve  Mild mitral annular calcification. No evidence of mitral valve stenosis. Physiologic and/or trace mitral regurgitation is present. Tricuspid Valve  The tricuspid valve appears structurally normal.  Mild tricuspid regurgitation. RVSP is 37 mmHg. Normal estimated PA systolic pressure.    Aortic Valve Structurally normal aortic valve. The aortic valve is trileaflet. No hemodynamically significant aortic stenosis is present. No evidence of aortic valve regurgitation. Pulmonic Valve  Pulmonic valve is structurally normal.  Physiologic and/or trace pulmonic regurgitation present. No evidence of pulmonic valve stenosis. Pericardial Effusion  No evidence for hemodynamically significant pericardial effusion. Pleural Effusion  No evidence of pleural effusion. Aorta  Normal aortic root and ascending aorta. Miscellaneous  Dilated Inferior Vena Cava. Inferior Vena Cava, normal respiratory variation. Conclusions   Summary  Normal left ventricle size and systolic function. Ejection fraction is visually estimated at 55-60%. Septal motion consistent with conduction abnormality . Normal left ventricle wall thickness. Indeterminate diastolic function. Normal right ventricular size and function. TAPSE 32 mm. No hemodynamically significant aortic stenosis is present. Mild tricuspid regurgitation. RVSP is 37 mmHg. Normal estimated PA systolic pressure. No evidence for hemodynamically significant pericardial effusion.    Signature   ----------------------------------------------------------------  Electronically signed by Geoffrey Espinoza MD(Interpreting  physician) on 06/11/2022 09:36 PM  ----------------------------------------------------------------  M-Mode/2D Measurements & Calculations   LV Diastolic    LV Systolic Dimension: 2.1   AV Cusp Separation: 2.1 cmLA  Dimension: 2.9  cm                           Dimension: 4.3 cmAO Root  cm              LV Volume Diastolic: 25.8 ml Dimension: 3.1 cm  LV FS:27.6 %    LV Volume Systolic: 85.2 ml  LV PW           LV EDV/LV EDV Index: 98.0  Diastolic: 1.6  SA/12 HZ/A^6KD ESV/LV ESV  cm              Index: 14.5 ml/7ml/ m^2      RV Diastolic Dimension: 3.6  LV PW Systolic: EF Calculated: 36.8 %        cm  2 cm            LV Mass Index: 67 l/min*m^2  Septum LV Length: 9.3 cm            LA/Aorta: 0.51  Diastolic: 1.2                               Ascending Aorta: 3.4 cm  cm              LVOT: 2.2 cm                 LA volume/Index: 47.5 ml  Septum                                       /92.24NA/C^8  Systolic: 1.7                                RA Area: 14.8 cm^2  cm                                               IVC Expiration: 2.3 cm  LV Mass: 134.39  g  Doppler Measurements & Calculations   MV Peak E-Wave: 0.64 AV Peak Velocity:     LVOT Peak Velocity: 1.14 m/s  m/s                  1.41 m/s              LVOT Mean Velocity: 0.87 m/s  MV Peak A-Wave: 1    AV Peak Gradient:     LVOT Peak Gradient: 5.2  m/s                  7.99 mmHg             mmHgLVOT Mean Gradient: 3.3  MV E/A Ratio: 0.64   AV Mean Velocity:     mmHg  MV Peak Gradient:    1.07 m/s              Estimated RVSP: 37.4 mmHg  3.5 mmHg             AV Mean Gradient: 5   Estimated RAP:8 mmHg  MV Mean Gradient:    mmHg  1.9 mmHg             AV VTI: 26.5 cm  MV Mean Velocity:    AV Area               TR Velocity:2.71 m/s  0.66 m/s             (Continuity):3.34     TR Gradient:29.42 mmHg  MV Deceleration      cm^2                  PV Peak Velocity: 0.93 m/s  Time: 219.8 msec                           PV Peak Gradient: 3.46 mmHg  MV P1/2t: 56.7 msec  LVOT VTI: 23.3 cm     PV Mean Velocity: 0.59 m/s  MVA by PHT:3.88 cm^2 IVRT: 129.2 msec      PV Mean Gradient: 1.6 mmHg  MV Area              Estimated PASP: 37.42  (continuity): 3.9    mmHg                  VT ED Velocity: 0.81 m/s  cm^2  MV E' Septal  Velocity: 0.03 m/s  MV E' Lateral  Velocity: 6 m/s  [email or web address] [id number]%3d    XR CHEST PORTABLE    Result Date: 6/9/2022  EXAMINATION: ONE XRAY VIEW OF THE CHEST 6/9/2022 12:12 pm COMPARISON: 03/03/2020 HISTORY: ORDERING SYSTEM PROVIDED HISTORY: cp TECHNOLOGIST PROVIDED HISTORY: Reason for exam:->cp What reading provider will be dictating this exam?->CRC FINDINGS: The lungs are without acute focal process. There is no effusion or pneumothorax. The cardiomediastinal silhouette is without acute process. The osseous structures are without acute process. No acute process. MRI CARDIAC W WO CONTRAST    Result Date: 6/15/2022  PROCEDURE: Cardiac MRI for further evaluation of cardiac arrhythmias. TECHNIQUE: 1.5 T. Turbo spin echo and gradient echo imaging in axial and double oblique planes for anatomic definition. Cine imaging for evaluation of function. . Delayed enhanced imaging for delineation of myocardial scar/fibrosis was performed. FINDINGS: Visually, the left ventricle is normal in size and systolic function with estimated left ventricular ejection fraction of 55-60%. There is mild concentric left ventricular hypertrophy with posterior and septal wall thickness of 1.2 cm. Visually, the right ventricle is normal in size and systolic function There is normal left ventricular myocardial perfusion at rest. There is no definite evidence of significant delay gadolinium enhancement to suggest myocardial scar or fibrosis. MEASUREMENTS: LV WYATT 4.5 cm LV ESD 3.0 cm Septal Wall Thickness 12 mm Posterior Wall Thickness 12 mm LV EF 55 to 60 % Ascending aorta 3.8cm Pulmonary artery dimension 3.1 cm Descending aorta 2.9cm Aorta at sinuses of valsalva 3.4cm Aorta at sinotubular junction 3.1cm 4 pulmonic veins are entering the left atrium (2 on the left and 2 on the right). 1. Visually, the left ventricle is normal in size and systolic function with estimated left ventricular ejection fraction of 55-60%. 2. There is mild concentric left ventricular hypertrophy with posterior and septal wall thickness of 1.2 cm. 3. Visually, the right ventricle is normal in size and systolic function 4. There is no definite evidence of significant delay gadolinium enhancement to suggest myocardial scar or fibrosis.      US RETROPERITONEAL COMPLETE    Result Date: 6/10/2022  EXAMINATION: RETROPERITONEAL ULTRASOUND OF THE KIDNEYS AND URINARY BLADDER 6/10/2022 COMPARISON: None used HISTORY: ORDERING SYSTEM PROVIDED HISTORY: Poorly controlled HTN TECHNOLOGIST PROVIDED HISTORY: Reason for exam:->Poorly controlled HTN What reading provider will be dictating this exam?->CRC FINDINGS: Kidneys: The right kidney measures 10.4 cm in length and the left kidney measures 11.0 cm in length. The corticomedullary differentiation and cortical thickness is maintained. Right mid to upper pole 19 mm anechoic thin walled nonvascular cyst.  No concerning renal mass. No intrarenal calcifications. There is no hydronephrosis. Bladder: No intrinsic mass, intrinsic calcification, nor wall thickening. Bladder volume was 270 mL. 1.  Right mid to upper pole 19 mm simple appearing cyst.  Otherwise normal appearance of the kidneys. No hydronephrosis. 2.  Normal appearance of the imaged bladder. NM Cardiac Stress Test Nuclear Imaging    Result Date: 6/11/2022  Indication:  Chest Pain Clinical History:   Patient has no known history of coronary artery disease. Procedure:  Pharmacologic stress testing was performed with Regadenoson 0.4 mg for 15 seconds. IMAGING: Myocardial perfusion imaging was performed at rest 30-35 minutes following the intravenous injection of 11.5 mCi of (Tc-Sestamibi) followed by 10 ml of Normal Saline. As per infusion protocol, the patient was injected intravenously with 38 mCi of (Tc-Sestamibi) followed by 10 ml of Normal Saline. Gated post-stress tomographic imaging was performed 20-25 minutes after stress. FINDINGS: The overall quality of the study was good Left ventricular cavity size was noted to be normal. Rotational analog analysis demonstrated no patient motion or extracardiac activity or attenuation artifact.  The gated SPECT stress imaging in the short, vertical long, and horizontal long axis demonstrated abnormal tracer distribution in the myocardium. A severe defect was present in the anterior and anteroseptal wall(s) that was  large sized by quantification. The resting images reveal complete reversibility. Gated SPECT left ventricular ejection fraction was calculated to be 60% with hypokinesis of the anterior wall. TID ratio 1.5. Low-dose noncontrast chest CT used for attenuation protocol showed mild coronary artery calcification involving the left main and proximal LAD. 1.  ECG during the infusion did not change. 2.  The myocardial perfusion imaging was abnormal 3. The abnormality was a large sized severe perfusion defect involving the anterior and anteroseptal walls suggestive ischemia in the LAD territory. 4.  Overall left ventricular systolic function was normal with regional wall motion abnormalities. 5.  There is transient ischemic dilatation. 6.  High risk general pharmacologic stress test. Thank you for sending your patient to this [employer]. Marta Hyatt MD, [facility] cardiology.        Discharge Medications:      Medication List      START taking these medications    aspirin 81 MG chewable tablet  Take 1 tablet by mouth daily     atorvastatin 40 MG tablet  Commonly known as: LIPITOR  Take 1 tablet by mouth nightly        CONTINUE taking these medications    amLODIPine 10 MG tablet  Commonly known as: NORVASC     Genvoya [phone] MG TABLET  Generic drug: elvitegravir-cobicistat-emtricitabine-tenofovir alafenamide     hydrALAZINE 100 MG tablet  Commonly known as: APRESOLINE     labetalol 100 MG tablet  Commonly known as: NORMODYNE     Vitamin D (Ergocalciferol) 50 MCG (2000 UT) Caps           Where to Get Your Medications      These medications were sent to [address] [place] [address],[address], [place] [address]  [facility], [address] [place] [address]    Phone: [phone]   · aspirin 81 MG chewable tablet  · atorvastatin 40 MG tablet Time Spent on discharge is more than 45 minutes in the examination, evaluation, counseling and review of medications and discharge plan.    +++++++++++++++++++++++++++++++++++++++++++++++++  Maurice Corbett, DO  [address], [place]  +++++++++++++++++++++++++++++++++++++++++++++++++  NOTE: This report was transcribed using voice recognition software. Every effort was made to ensure accuracy; however, inadvertent computerized transcription errors may be present.

## 2022-06-30 ENCOUNTER — HOSPITAL ENCOUNTER (INPATIENT)
Age: 57
LOS: 2 days | Discharge: HOME | DRG: 243 | End: 2022-07-02
Attending: EMERGENCY MEDICINE
Payer: COMMERCIAL

## 2022-06-30 ENCOUNTER — APPOINTMENT (OUTPATIENT)
Dept: GENERAL RADIOLOGY | Age: 57
DRG: 243 | End: 2022-06-30
Payer: COMMERCIAL

## 2022-06-30 DIAGNOSIS — I44.2 THIRD DEGREE HEART BLOCK (HCC): Primary | ICD-10-CM

## 2022-06-30 LAB
ALBUMIN SERPL-MCNC: 4.9 G/DL (ref 3.5–5.2)
ALP BLD-CCNC: 77 U/L (ref 40–129)
ALT SERPL-CCNC: 22 U/L (ref 0–40)
ANION GAP SERPL CALCULATED.3IONS-SCNC: 15 MMOL/L (ref 7–16)
AST SERPL-CCNC: 20 U/L (ref 0–39)
BASOPHILS ABSOLUTE: 0.03 E9/L (ref 0–0.2)
BASOPHILS RELATIVE PERCENT: 0.5 % (ref 0–2)
BILIRUB SERPL-MCNC: 0.3 MG/DL (ref 0–1.2)
BUN BLDV-MCNC: 16 MG/DL (ref 6–20)
CALCIUM SERPL-MCNC: 10.2 MG/DL (ref 8.6–10.2)
CHLORIDE BLD-SCNC: 103 MMOL/L (ref 98–107)
CO2: 23 MMOL/L (ref 22–29)
CREAT SERPL-MCNC: 1.4 MG/DL (ref 0.7–1.2)
EKG ATRIAL RATE: 91 BPM
EKG P AXIS: 21 DEGREES
EKG Q-T INTERVAL: 458 MS
EKG QRS DURATION: 166 MS
EKG QTC CALCULATION (BAZETT): 453 MS
EKG R AXIS: 6 DEGREES
EKG T AXIS: 35 DEGREES
EKG VENTRICULAR RATE: 59 BPM
EOSINOPHILS ABSOLUTE: 0.03 E9/L (ref 0.05–0.5)
EOSINOPHILS RELATIVE PERCENT: 0.5 % (ref 0–6)
GFR AFRICAN AMERICAN: >60
GFR NON-AFRICAN AMERICAN: >60 ML/MIN/1.73
GLUCOSE BLD-MCNC: 109 MG/DL (ref 74–99)
HCT VFR BLD CALC: 42 % (ref 37–54)
HEMOGLOBIN: 13.9 G/DL (ref 12.5–16.5)
IMMATURE GRANULOCYTES #: 0.02 E9/L
IMMATURE GRANULOCYTES %: 0.3 % (ref 0–5)
LIPASE: 15 U/L (ref 13–60)
LYMPHOCYTES ABSOLUTE: 2.49 E9/L (ref 1.5–4)
LYMPHOCYTES RELATIVE PERCENT: 38.9 % (ref 20–42)
MAGNESIUM: 2 MG/DL (ref 1.6–2.6)
MCH RBC QN AUTO: 31.7 PG (ref 26–35)
MCHC RBC AUTO-ENTMCNC: 33.1 % (ref 32–34.5)
MCV RBC AUTO: 95.9 FL (ref 80–99.9)
MONOCYTES ABSOLUTE: 0.34 E9/L (ref 0.1–0.95)
MONOCYTES RELATIVE PERCENT: 5.3 % (ref 2–12)
NEUTROPHILS ABSOLUTE: 3.49 E9/L (ref 1.8–7.3)
NEUTROPHILS RELATIVE PERCENT: 54.5 % (ref 43–80)
PDW BLD-RTO: 11.9 FL (ref 11.5–15)
PLATELET # BLD: 342 E9/L (ref 130–450)
PMV BLD AUTO: 9.7 FL (ref 7–12)
POTASSIUM SERPL-SCNC: 4.2 MMOL/L (ref 3.5–5)
PRO-BNP: 244 PG/ML (ref 0–125)
RBC # BLD: 4.38 E12/L (ref 3.8–5.8)
SODIUM BLD-SCNC: 141 MMOL/L (ref 132–146)
TOTAL PROTEIN: 8.2 G/DL (ref 6.4–8.3)
TROPONIN, HIGH SENSITIVITY: 12 NG/L (ref 0–11)
TROPONIN, HIGH SENSITIVITY: 12 NG/L (ref 0–11)
WBC # BLD: 6.4 E9/L (ref 4.5–11.5)

## 2022-06-30 PROCEDURE — 6370000000 HC RX 637 (ALT 250 FOR IP): Performed by: INTERNAL MEDICINE

## 2022-06-30 PROCEDURE — 85025 COMPLETE CBC W/AUTO DIFF WBC: CPT

## 2022-06-30 PROCEDURE — 84484 ASSAY OF TROPONIN QUANT: CPT

## 2022-06-30 PROCEDURE — 6370000000 HC RX 637 (ALT 250 FOR IP): Performed by: STUDENT IN AN ORGANIZED HEALTH CARE EDUCATION/TRAINING PROGRAM

## 2022-06-30 PROCEDURE — 71045 X-RAY EXAM CHEST 1 VIEW: CPT

## 2022-06-30 PROCEDURE — 93005 ELECTROCARDIOGRAM TRACING: CPT | Performed by: STUDENT IN AN ORGANIZED HEALTH CARE EDUCATION/TRAINING PROGRAM

## 2022-06-30 PROCEDURE — 83690 ASSAY OF LIPASE: CPT

## 2022-06-30 PROCEDURE — 2140000000 HC CCU INTERMEDIATE R&B

## 2022-06-30 PROCEDURE — 83735 ASSAY OF MAGNESIUM: CPT

## 2022-06-30 PROCEDURE — 83880 ASSAY OF NATRIURETIC PEPTIDE: CPT

## 2022-06-30 PROCEDURE — 80053 COMPREHEN METABOLIC PANEL: CPT

## 2022-06-30 PROCEDURE — 99285 EMERGENCY DEPT VISIT HI MDM: CPT

## 2022-06-30 RX ORDER — DOCUSATE SODIUM 100 MG/1
100 CAPSULE, LIQUID FILLED ORAL NIGHTLY
Status: DISCONTINUED | OUTPATIENT
Start: 2022-06-30 | End: 2022-07-02 | Stop reason: HOSPADM

## 2022-06-30 RX ORDER — LABETALOL 100 MG/1
100 TABLET, FILM COATED ORAL 2 TIMES DAILY
Status: DISCONTINUED | OUTPATIENT
Start: 2022-06-30 | End: 2022-07-02

## 2022-06-30 RX ORDER — ATORVASTATIN CALCIUM 40 MG/1
40 TABLET, FILM COATED ORAL NIGHTLY
Status: DISCONTINUED | OUTPATIENT
Start: 2022-06-30 | End: 2022-07-02

## 2022-06-30 RX ORDER — ACETAMINOPHEN 325 MG/1
650 TABLET ORAL EVERY 4 HOURS PRN
Status: DISCONTINUED | OUTPATIENT
Start: 2022-06-30 | End: 2022-07-02 | Stop reason: HOSPADM

## 2022-06-30 RX ORDER — CHOLECALCIFEROL (VITAMIN D3) 50 MCG
2000 TABLET ORAL DAILY
Status: DISCONTINUED | OUTPATIENT
Start: 2022-06-30 | End: 2022-07-02 | Stop reason: HOSPADM

## 2022-06-30 RX ORDER — ASPIRIN 81 MG/1
81 TABLET ORAL DAILY
Status: DISCONTINUED | OUTPATIENT
Start: 2022-07-01 | End: 2022-07-02 | Stop reason: HOSPADM

## 2022-06-30 RX ORDER — PANTOPRAZOLE SODIUM 40 MG/1
40 TABLET, DELAYED RELEASE ORAL
Status: DISCONTINUED | OUTPATIENT
Start: 2022-07-01 | End: 2022-07-02 | Stop reason: HOSPADM

## 2022-06-30 RX ORDER — ENOXAPARIN SODIUM 100 MG/ML
40 INJECTION SUBCUTANEOUS DAILY
Status: DISCONTINUED | OUTPATIENT
Start: 2022-06-30 | End: 2022-07-01

## 2022-06-30 RX ORDER — ASPIRIN 81 MG/1
81 TABLET, CHEWABLE ORAL DAILY
Status: DISCONTINUED | OUTPATIENT
Start: 2022-06-30 | End: 2022-06-30 | Stop reason: SDUPTHER

## 2022-06-30 RX ORDER — HYDRALAZINE HYDROCHLORIDE 50 MG/1
100 TABLET, FILM COATED ORAL 3 TIMES DAILY
Status: DISCONTINUED | OUTPATIENT
Start: 2022-06-30 | End: 2022-07-02 | Stop reason: HOSPADM

## 2022-06-30 RX ORDER — AMLODIPINE BESYLATE 10 MG/1
10 TABLET ORAL EVERY MORNING
Status: DISCONTINUED | OUTPATIENT
Start: 2022-07-01 | End: 2022-07-02 | Stop reason: HOSPADM

## 2022-06-30 RX ORDER — ASPIRIN 81 MG/1
324 TABLET, CHEWABLE ORAL ONCE
Status: COMPLETED | OUTPATIENT
Start: 2022-06-30 | End: 2022-06-30

## 2022-06-30 RX ADMIN — ASPIRIN 324 MG: 81 TABLET, CHEWABLE ORAL at 16:37

## 2022-06-30 RX ADMIN — LABETALOL HYDROCHLORIDE 100 MG: 100 TABLET, FILM COATED ORAL at 23:22

## 2022-06-30 RX ADMIN — Medication 2000 UNITS: at 20:16

## 2022-06-30 RX ADMIN — ELVITEGRAVIR, COBICISTAT, EMTRICITABINE, AND TENOFOVIR ALAFENAMIDE 1 TABLET: 150; 150; 200; 10 TABLET ORAL at 20:17

## 2022-06-30 ASSESSMENT — ENCOUNTER SYMPTOMS
ABDOMINAL DISTENTION: 0
SHORTNESS OF BREATH: 0
COLOR CHANGE: 0
VOMITING: 1
CONSTIPATION: 0
ABDOMINAL PAIN: 0
DIARRHEA: 0
BACK PAIN: 0
STRIDOR: 0
NAUSEA: 0
COUGH: 0
WHEEZING: 0

## 2022-06-30 ASSESSMENT — PAIN DESCRIPTION - ORIENTATION: ORIENTATION: MID

## 2022-06-30 ASSESSMENT — PAIN SCALES - GENERAL
PAINLEVEL_OUTOF10: 2
PAINLEVEL_OUTOF10: 0
PAINLEVEL_OUTOF10: 0

## 2022-06-30 ASSESSMENT — PAIN DESCRIPTION - LOCATION: LOCATION: CHEST

## 2022-06-30 NOTE — ED NOTES
N2N to [facility]. Patient placed in transport queue.        Zay Silva RN  06/30/22 [id number]

## 2022-06-30 NOTE — PROGRESS NOTES
Elena Peralta 89 Admission arrived from ED. VS Stable. Skin intact. 1930  Room to room completed with CHARITY Funes. 2000  Perfect Serve sent to EP for consult for 2nd or 3rd degree block.     Gabino Meeks RN

## 2022-06-30 NOTE — ED PROVIDER NOTES
[address]      Pt Name: Fern Rosa  MRN: [id number]  [place] 1965  Date of evaluation: 6/30/2022      CHIEF COMPLAINT       Chief Complaint   Patient presents with    Chest Pain     midsternal cp, nonradiating, started this AM        HPI  Fern Rosa is a 60 y.o. male history of hypertension presents with complaints of chest pain. Patient has been recently admitted to the hospital and recommended pacemaker as and his NM scan showed a large severe perfusion defect in the LAD territory. Cardiac catheterization that was unremarkable. He was recommended to receive a pacemaker. Patient left AMA at 6/9/2022. Patient follow-up with his primary care physician on [phone] who has the patient following at the [facility] clinic cardiology tomorrow. Today the patient states that he has chest pain, that is acute on onset, dull, substernal, moderate in severity, lasting for 30 minutes. No relieving or exacerbating factors. Patient states that the pain is not associated with food intake. Not taking any other medications than his prescribed antihypertensives with any relief of symptoms. Except as noted above the remainder of the review of systems was reviewed and negative. Review of Systems   Constitutional: Negative for activity change, appetite change, diaphoresis, fatigue, fever and unexpected weight change. HENT: Negative for congestion. Eyes: Negative for visual disturbance. Respiratory: Negative for cough, shortness of breath, wheezing and stridor. Cardiovascular: Positive for chest pain. Negative for palpitations and leg swelling. Gastrointestinal: Positive for vomiting. Negative for abdominal distention, abdominal pain, constipation, diarrhea and nausea. Endocrine: Negative for polyphagia and polyuria. Genitourinary: Negative for dysuria and hematuria.    Musculoskeletal: Negative for back pain. Skin: Negative for color change, pallor, rash and wound. Neurological: Negative for dizziness and syncope. Hematological: Negative for adenopathy. Does not bruise/bleed easily. Psychiatric/Behavioral: Negative for agitation. Physical Exam  Vitals and nursing note reviewed. Constitutional:       General: He is not in acute distress. Appearance: Normal appearance. He is not ill-appearing or diaphoretic. HENT:      Head: Normocephalic and atraumatic. Right Ear: External ear normal.      Left Ear: External ear normal.      Nose: Nose normal.      Mouth/Throat:      Mouth: Mucous membranes are moist.      Pharynx: Oropharynx is clear. Eyes:      Extraocular Movements: Extraocular movements intact. Pupils: Pupils are equal, round, and reactive to light. Cardiovascular:      Rate and Rhythm: Normal rate and regular rhythm. Pulses: Normal pulses. Heart sounds: Normal heart sounds. Pulmonary:      Effort: Pulmonary effort is normal.      Breath sounds: Normal breath sounds. Abdominal:      General: Abdomen is flat. Palpations: Abdomen is soft. Tenderness: There is no abdominal tenderness. There is no right CVA tenderness, left CVA tenderness or rebound. Negative signs include Freire's sign, Rovsing's sign and McBurney's sign. Comments: Negative Freire sign. No epigastric tenderness palpation. Negative CVA tenderness bilaterally. Musculoskeletal:         General: No tenderness. Normal range of motion. Cervical back: Normal range of motion. Right lower leg: No edema. Left lower leg: No edema. Skin:     General: Skin is warm and dry. Capillary Refill: Capillary refill takes less than 2 seconds. Neurological:      General: No focal deficit present. Mental Status: He is alert and oriented to person, place, and time.    Psychiatric:         Mood and Affect: Mood normal.          Procedures     MDM  Number of Diagnoses or Management Options  Third degree heart block (HCC)  Diagnosis management comments: 55-year-old male history of recent cardiac catheterization, NM scan showing LAD occlusion presents with complaints of chest pain. Vitals here within normal limits. On physical exam patient is in no acute distress, speaking full sentences alert and orient x3. Chest is nontender to palpation. Lungs are clear to auscultation bilaterally. Abdomen soft nontender no rebound or guarding. Diagnostic labs and imaging interpreted reviewed. Patient is in third-degree heart block. Delta troponin negative. Patient is now willing for to get his pacemaker done here in our facility. Spoke with electrophysiology who has consulted on patient. Patient has been admitted to the hospital.       Amount and/or Complexity of Data Reviewed  Decide to obtain previous medical records or to obtain history from someone other than the patient: yes         ED Course as of 06/30/22 1625   Thu Jun 30, 2022   1545 I informed patient the results of his evaluation. Concern is that patient is in third-degree heart block. Patient still considering if he is going to go to [facility]. Patient is considering leaving against medical advice versus having pacemaker placed here in the hospital. [JV]   1602 Troponin(!):    Troponin, High Sensitivity 12(!)  Delta troponin negative. [JV]   Erick Carney 55 with Dr. Walt Hermosillo. Patient has been admitted to the hospital for third-degree heart block. [JV]      ED Course User Index  [JV] Helena White MD         --------------------------------------------- PAST HISTORY ---------------------------------------------  Past Medical History:  has a past medical history of Hepatitis C, HIV (human immunodeficiency virus infection) ([facility] [address] and Hypertension. Past Surgical History:  has a past surgical history that includes liver biopsy (01/2017); pyloromyotomy (1963); Colonoscopy (N/A, 12/11/2018);  Colonoscopy (06/18/2021); and Colonoscopy (N/A, 6/18/2021). Social History:  reports that he quit smoking about 27 years ago. His smoking use included cigarettes. He started smoking about 43 years ago. He smoked 0.10 packs per day. He has never used smokeless tobacco. He reports current alcohol use. He reports that he does not use drugs. Family History: family history includes Diabetes in his father; Heart Disease in his father; High Blood Pressure in his father. The patients home medications have been reviewed.     Allergies: Penicillins    -------------------------------------------------- RESULTS -------------------------------------------------    LABS:  Results for orders placed or performed during the hospital encounter of 06/30/22   CBC with Auto Differential   Result Value Ref Range    WBC 6.4 4.5 - 11.5 E9/L    RBC 4.38 3.80 - 5.80 E12/L    Hemoglobin 13.9 12.5 - 16.5 g/dL    Hematocrit 42.0 37.0 - 54.0 %    MCV 95.9 80.0 - 99.9 fL    MCH 31.7 26.0 - 35.0 pg    MCHC 33.1 32.0 - 34.5 %    RDW 11.9 11.5 - 15.0 fL    Platelets 944 989 - 875 E9/L    MPV 9.7 7.0 - 12.0 fL    Neutrophils % 54.5 43.0 - 80.0 %    Immature Granulocytes % 0.3 0.0 - 5.0 %    Lymphocytes % 38.9 20.0 - 42.0 %    Monocytes % 5.3 2.0 - 12.0 %    Eosinophils % 0.5 0.0 - 6.0 %    Basophils % 0.5 0.0 - 2.0 %    Neutrophils Absolute 3.49 1.80 - 7.30 E9/L    Immature Granulocytes # 0.02 E9/L    Lymphocytes Absolute 2.49 1.50 - 4.00 E9/L    Monocytes Absolute 0.34 0.10 - 0.95 E9/L    Eosinophils Absolute 0.03 (L) 0.05 - 0.50 E9/L    Basophils Absolute 0.03 0.00 - 0.20 E9/L   Troponin   Result Value Ref Range    Troponin, High Sensitivity 12 (H) 0 - 11 ng/L   Brain Natriuretic Peptide   Result Value Ref Range    Pro- (H) 0 - 125 pg/mL   Lipase   Result Value Ref Range    Lipase 15 13 - 60 U/L   Comprehensive Metabolic Panel   Result Value Ref Range    Sodium 141 132 - 146 mmol/L    Potassium 4.2 3.5 - 5.0 mmol/L    Chloride 103 98 - 107 mmol/L    CO2 23 22 - 29 mmol/L    Anion Gap 15 7 - 16 mmol/L    Glucose 109 (H) 74 - 99 mg/dL    BUN 16 6 - 20 mg/dL    CREATININE 1.4 (H) 0.7 - 1.2 mg/dL    GFR Non-African American >60 >=60 mL/min/1.73    GFR African American >60     Calcium 10.2 8.6 - 10.2 mg/dL    Total Protein 8.2 6.4 - 8.3 g/dL    Albumin 4.9 3.5 - 5.2 g/dL    Total Bilirubin 0.3 0.0 - 1.2 mg/dL    Alkaline Phosphatase 77 40 - 129 U/L    ALT 22 0 - 40 U/L    AST 20 0 - 39 U/L   Magnesium   Result Value Ref Range    Magnesium 2.0 1.6 - 2.6 mg/dL   Troponin   Result Value Ref Range    Troponin, High Sensitivity 12 (H) 0 - 11 ng/L   EKG 12 Lead   Result Value Ref Range    Ventricular Rate 59 BPM    Atrial Rate 91 BPM    QRS Duration 166 ms    Q-T Interval 458 ms    QTc Calculation (Bazett) 453 ms    P Axis 21 degrees    R Axis 6 degrees    T Axis 35 degrees       RADIOLOGY:  XR CHEST PORTABLE   Final Result   No acute process. EKG:  This EKG is signed and interpreted by me. EKG read inter by me. Heart rate 60. Patient is in third-degree heart block. Normal axis deviation. QTc 460.      ------------------------- NURSING NOTES AND VITALS REVIEWED ---------------------------  Date / Time Roomed:  6/30/2022  1:03 PM  ED Bed Assignment:  16/16    The nursing notes within the ED encounter and vital signs as below have been reviewed. Patient Vitals for the past 24 hrs:   BP Temp Pulse Resp SpO2 Height Weight   06/30/22 1400 (!) 142/93 -- 58 22 97 % -- --   06/30/22 1304 (!) 144/97 98.4 °F (36.9 °C) 76 18 97 % 5' 7\" (1.702 m) 190 lb (86.2 kg)       Oxygen Saturation Interpretation: Normal    ------------------------------------------ PROGRESS NOTES ------------------------------------------    Counseling:  I have spoken with the patient and discussed todays results, in addition to providing specific details for the plan of care and counseling regarding the diagnosis and prognosis.   Their questions are answered at this time and they are agreeable with the plan of admission.    --------------------------------- ADDITIONAL PROVIDER NOTES ---------------------------------  Consultations:  Spoke with Dr. Vimal La. Discussed case. They will admit the patient. Spoke with Dr. Jo Ann Martinez electrophysiology who will consult on patient. This patient's ED course included: a personal history and physicial examination, re-evaluation prior to disposition, multiple bedside re-evaluations, IV medications, cardiac monitoring and continuous pulse oximetry    This patient has remained hemodynamically stable during their ED course. Diagnosis:  1.  Third degree heart block ([address]        Disposition:  Patient's disposition: Admit to telemetry  Patient's condition is serious          Flores Lu MD  Resident  07/02/22 0436

## 2022-07-01 ENCOUNTER — ANESTHESIA EVENT (OUTPATIENT)
Dept: CARDIAC CATH/INVASIVE PROCEDURES | Age: 57
DRG: 243 | End: 2022-07-01
Payer: COMMERCIAL

## 2022-07-01 ENCOUNTER — ANESTHESIA (OUTPATIENT)
Dept: CARDIAC CATH/INVASIVE PROCEDURES | Age: 57
DRG: 243 | End: 2022-07-01
Payer: COMMERCIAL

## 2022-07-01 ENCOUNTER — APPOINTMENT (OUTPATIENT)
Dept: GENERAL RADIOLOGY | Age: 57
DRG: 243 | End: 2022-07-01
Payer: COMMERCIAL

## 2022-07-01 LAB
ANION GAP SERPL CALCULATED.3IONS-SCNC: 17 MMOL/L (ref 7–16)
BUN BLDV-MCNC: 19 MG/DL (ref 6–20)
CALCIUM SERPL-MCNC: 10 MG/DL (ref 8.6–10.2)
CHLORIDE BLD-SCNC: 102 MMOL/L (ref 98–107)
CO2: 19 MMOL/L (ref 22–29)
CREAT SERPL-MCNC: 1.7 MG/DL (ref 0.7–1.2)
GFR AFRICAN AMERICAN: 50
GFR NON-AFRICAN AMERICAN: 50 ML/MIN/1.73
GLUCOSE BLD-MCNC: 106 MG/DL (ref 74–99)
POTASSIUM SERPL-SCNC: 3.7 MMOL/L (ref 3.5–5)
SODIUM BLD-SCNC: 138 MMOL/L (ref 132–146)

## 2022-07-01 PROCEDURE — 36415 COLL VENOUS BLD VENIPUNCTURE: CPT

## 2022-07-01 PROCEDURE — B51N1ZZ FLUOROSCOPY OF LEFT UPPER EXTREMITY VEINS USING LOW OSMOLAR CONTRAST: ICD-10-PCS | Performed by: INTERNAL MEDICINE

## 2022-07-01 PROCEDURE — C1785 PMKR, DUAL, RATE-RESP: HCPCS

## 2022-07-01 PROCEDURE — 6370000000 HC RX 637 (ALT 250 FOR IP): Performed by: INTERNAL MEDICINE

## 2022-07-01 PROCEDURE — 2500000003 HC RX 250 WO HCPCS

## 2022-07-01 PROCEDURE — C1894 INTRO/SHEATH, NON-LASER: HCPCS

## 2022-07-01 PROCEDURE — C1889 IMPLANT/INSERT DEVICE, NOC: HCPCS

## 2022-07-01 PROCEDURE — 02H63JZ INSERTION OF PACEMAKER LEAD INTO RIGHT ATRIUM, PERCUTANEOUS APPROACH: ICD-10-PCS | Performed by: INTERNAL MEDICINE

## 2022-07-01 PROCEDURE — 6360000002 HC RX W HCPCS

## 2022-07-01 PROCEDURE — 2140000000 HC CCU INTERMEDIATE R&B

## 2022-07-01 PROCEDURE — 6360000002 HC RX W HCPCS: Performed by: NURSE ANESTHETIST, CERTIFIED REGISTERED

## 2022-07-01 PROCEDURE — 2500000003 HC RX 250 WO HCPCS: Performed by: NURSE ANESTHETIST, CERTIFIED REGISTERED

## 2022-07-01 PROCEDURE — 71045 X-RAY EXAM CHEST 1 VIEW: CPT

## 2022-07-01 PROCEDURE — 2580000003 HC RX 258

## 2022-07-01 PROCEDURE — 2580000003 HC RX 258: Performed by: NURSE PRACTITIONER

## 2022-07-01 PROCEDURE — 97165 OT EVAL LOW COMPLEX 30 MIN: CPT

## 2022-07-01 PROCEDURE — 99222 1ST HOSP IP/OBS MODERATE 55: CPT | Performed by: INTERNAL MEDICINE

## 2022-07-01 PROCEDURE — 2580000003 HC RX 258: Performed by: INTERNAL MEDICINE

## 2022-07-01 PROCEDURE — C1898 LEAD, PMKR, OTHER THAN TRANS: HCPCS

## 2022-07-01 PROCEDURE — 02HK3JZ INSERTION OF PACEMAKER LEAD INTO RIGHT VENTRICLE, PERCUTANEOUS APPROACH: ICD-10-PCS | Performed by: INTERNAL MEDICINE

## 2022-07-01 PROCEDURE — 33208 INSRT HEART PM ATRIAL & VENT: CPT

## 2022-07-01 PROCEDURE — 0JH606Z INSERTION OF PACEMAKER, DUAL CHAMBER INTO CHEST SUBCUTANEOUS TISSUE AND FASCIA, OPEN APPROACH: ICD-10-PCS | Performed by: INTERNAL MEDICINE

## 2022-07-01 PROCEDURE — 2580000003 HC RX 258: Performed by: NURSE ANESTHETIST, CERTIFIED REGISTERED

## 2022-07-01 PROCEDURE — 33208 INSRT HEART PM ATRIAL & VENT: CPT | Performed by: INTERNAL MEDICINE

## 2022-07-01 PROCEDURE — 80048 BASIC METABOLIC PNL TOTAL CA: CPT

## 2022-07-01 PROCEDURE — 2709999900 HC NON-CHARGEABLE SUPPLY

## 2022-07-01 RX ORDER — SODIUM CHLORIDE 0.9 % (FLUSH) 0.9 %
5-40 SYRINGE (ML) INJECTION EVERY 12 HOURS SCHEDULED
Status: DISCONTINUED | OUTPATIENT
Start: 2022-07-01 | End: 2022-07-02 | Stop reason: HOSPADM

## 2022-07-01 RX ORDER — PHENYLEPHRINE HCL IN 0.9% NACL 1 MG/10 ML
SYRINGE (ML) INTRAVENOUS PRN
Status: DISCONTINUED | OUTPATIENT
Start: 2022-07-01 | End: 2022-07-01 | Stop reason: SDUPTHER

## 2022-07-01 RX ORDER — SODIUM CHLORIDE 0.9 % (FLUSH) 0.9 %
5-40 SYRINGE (ML) INJECTION PRN
Status: DISCONTINUED | OUTPATIENT
Start: 2022-07-01 | End: 2022-07-02 | Stop reason: HOSPADM

## 2022-07-01 RX ORDER — MIDAZOLAM HYDROCHLORIDE 1 MG/ML
INJECTION INTRAMUSCULAR; INTRAVENOUS PRN
Status: DISCONTINUED | OUTPATIENT
Start: 2022-07-01 | End: 2022-07-01 | Stop reason: SDUPTHER

## 2022-07-01 RX ORDER — SODIUM CHLORIDE 9 MG/ML
INJECTION, SOLUTION INTRAVENOUS CONTINUOUS
Status: DISCONTINUED | OUTPATIENT
Start: 2022-07-01 | End: 2022-07-02

## 2022-07-01 RX ORDER — VANCOMYCIN HYDROCHLORIDE 1 G/20ML
INJECTION, POWDER, LYOPHILIZED, FOR SOLUTION INTRAVENOUS PRN
Status: DISCONTINUED | OUTPATIENT
Start: 2022-07-01 | End: 2022-07-01 | Stop reason: SDUPTHER

## 2022-07-01 RX ORDER — SODIUM CHLORIDE 9 MG/ML
INJECTION, SOLUTION INTRAVENOUS CONTINUOUS PRN
Status: DISCONTINUED | OUTPATIENT
Start: 2022-07-01 | End: 2022-07-01 | Stop reason: SDUPTHER

## 2022-07-01 RX ORDER — SODIUM CHLORIDE 9 MG/ML
INJECTION, SOLUTION INTRAVENOUS PRN
Status: DISCONTINUED | OUTPATIENT
Start: 2022-07-01 | End: 2022-07-02 | Stop reason: HOSPADM

## 2022-07-01 RX ORDER — PROPOFOL 10 MG/ML
INJECTION, EMULSION INTRAVENOUS PRN
Status: DISCONTINUED | OUTPATIENT
Start: 2022-07-01 | End: 2022-07-01 | Stop reason: SDUPTHER

## 2022-07-01 RX ORDER — FENTANYL CITRATE 50 UG/ML
INJECTION, SOLUTION INTRAMUSCULAR; INTRAVENOUS PRN
Status: DISCONTINUED | OUTPATIENT
Start: 2022-07-01 | End: 2022-07-01 | Stop reason: SDUPTHER

## 2022-07-01 RX ADMIN — Medication 100 MCG: at 16:57

## 2022-07-01 RX ADMIN — ELVITEGRAVIR, COBICISTAT, EMTRICITABINE, AND TENOFOVIR ALAFENAMIDE 1 TABLET: 150; 150; 200; 10 TABLET ORAL at 18:46

## 2022-07-01 RX ADMIN — ACETAMINOPHEN 325MG 650 MG: 325 TABLET ORAL at 20:05

## 2022-07-01 RX ADMIN — HYDRALAZINE HYDROCHLORIDE 100 MG: 50 TABLET, FILM COATED ORAL at 20:12

## 2022-07-01 RX ADMIN — PROPOFOL 50 MCG/KG/MIN: 10 INJECTION, EMULSION INTRAVENOUS at 16:37

## 2022-07-01 RX ADMIN — Medication 100 MCG: at 17:22

## 2022-07-01 RX ADMIN — LABETALOL HYDROCHLORIDE 100 MG: 100 TABLET, FILM COATED ORAL at 20:12

## 2022-07-01 RX ADMIN — Medication 100 MCG: at 17:17

## 2022-07-01 RX ADMIN — SODIUM CHLORIDE, PRESERVATIVE FREE 10 ML: 5 INJECTION INTRAVENOUS at 20:04

## 2022-07-01 RX ADMIN — Medication 2000 UNITS: at 10:08

## 2022-07-01 RX ADMIN — PROPOFOL 50 MG: 10 INJECTION, EMULSION INTRAVENOUS at 16:35

## 2022-07-01 RX ADMIN — Medication 100 MCG: at 17:06

## 2022-07-01 RX ADMIN — DOCUSATE SODIUM 100 MG: 100 CAPSULE, LIQUID FILLED ORAL at 20:05

## 2022-07-01 RX ADMIN — FENTANYL CITRATE 100 MCG: 50 INJECTION, SOLUTION INTRAMUSCULAR; INTRAVENOUS at 16:34

## 2022-07-01 RX ADMIN — VANCOMYCIN HYDROCHLORIDE 1000 MG: 1 INJECTION, POWDER, LYOPHILIZED, FOR SOLUTION INTRAVENOUS at 16:37

## 2022-07-01 RX ADMIN — MIDAZOLAM 2 MG: 1 INJECTION INTRAMUSCULAR; INTRAVENOUS at 16:30

## 2022-07-01 RX ADMIN — SODIUM CHLORIDE: 9 INJECTION, SOLUTION INTRAVENOUS at 12:30

## 2022-07-01 RX ADMIN — SODIUM CHLORIDE: 9 INJECTION, SOLUTION INTRAVENOUS at 16:29

## 2022-07-01 ASSESSMENT — PAIN SCALES - GENERAL: PAINLEVEL_OUTOF10: 8

## 2022-07-01 NOTE — H&P
[facility]  History and Physical      CHIEF COMPLAINT:    Chief Complaint   Patient presents with    Chest Pain     midsternal cp, nonradiating, started this AM        Patient of Artist Kawasaki, MD presents with:  Complete heart block ([facility] [address]    History of Present Illness:   Patient is a 49-year-old male with a past medical history of hepatitis C, HIV, HTN who presents to the emergency room for midsternal chest pain. Patient describes the chest pain as nonradiating midsternal with no associated nausea or vomiting. Patient was recently admitted and discharged for chest pain and was seen by EP and cardio were following in which he had a complete work up. On arrival to emergency patient had an EKG identifying an AV dissociation. EP was consulted and patient was explained risks and benefits of a pacemaker placement. On assessment today patient denied any of the CP he had been experiencing yesterday and states he was on his way down soon to have his pacer placed. Patient also denied any SOB, abdominal pain, fever, chills, N/V/D. Patient is admitted to intermediate telemetry and for further cardiac work-up. REVIEW OF SYSTEMS:  Pertinent negatives are above in HPI. 10 point ROS otherwise negative.       Past Medical History:   Diagnosis Date    Hepatitis C     Had treatment 2017    HIV (human immunodeficiency virus infection) ([facility] [address]     Hypertension          Past Surgical History:   Procedure Laterality Date    COLONOSCOPY N/A 12/11/2018    Distal ascending colon polyps ×2 removed with snare polypectomy ×1 and biopsy and cauterized x 1 (both Tubular Adenomas), rectal polyp removed with biopsy cauterization (squamous dysplasia), Dr. Agus Puckett, [phone] COLONOSCOPY  06/18/2021    Normal colon, Dr. Agus Puckett, [address] COLONOSCOPY N/A 6/18/2021    COLORECTAL CANCER SCREENING, HIGH RISK performed by Carolina Spann MD at [place] [address]  01/2017    Chronic Hepatitis C, [facility] insight. Normal mood and affect. Neuro: Alert and interactive, face symmetric, speech fluent. LABS:  All labs reviewed. Of note:  CBC:   Lab Results   Component Value Date/Time    WBC 6.4 06/30/2022 01:51 PM    RBC 4.38 06/30/2022 01:51 PM    HGB 13.9 06/30/2022 01:51 PM    HCT 42.0 06/30/2022 01:51 PM    MCV 95.9 06/30/2022 01:51 PM    MCH 31.7 06/30/2022 01:51 PM    MCHC 33.1 06/30/2022 01:51 PM    RDW 11.9 06/30/2022 01:51 PM     06/30/2022 01:51 PM    MPV 9.7 06/30/2022 01:51 PM     CMP:    Lab Results   Component Value Date/Time     07/01/2022 05:50 AM    K 3.7 07/01/2022 05:50 AM    K 3.6 06/10/2022 06:35 AM     07/01/2022 05:50 AM    CO2 19 07/01/2022 05:50 AM    BUN 19 07/01/2022 05:50 AM    CREATININE 1.7 07/01/2022 05:50 AM    GFRAA 50 07/01/2022 05:50 AM    LABGLOM 50 07/01/2022 05:50 AM    GLUCOSE 106 07/01/2022 05:50 AM    PROT 8.2 06/30/2022 01:51 PM    LABALBU 4.9 06/30/2022 01:51 PM    CALCIUM 10.0 07/01/2022 05:50 AM    BILITOT 0.3 06/30/2022 01:51 PM    ALKPHOS 77 06/30/2022 01:51 PM    AST 20 06/30/2022 01:51 PM    ALT 22 06/30/2022 01:51 PM       Imaging:  CXR: Negative    EKG:  Sinus rhythm with PACs with A-V dissociation and a left bundle branch block    Telemetry:  2nd degree AV Block type II    ASSESSMENT/PLAN:  Principal Problem:    Complete heart block ([address]  Resolved Problems:    * No resolved hospital problems. *    Patient is a 49-year-old male admitted to [facility] for  Complete heart block  -Monitor labs  -Check TSH and T4  -Consult EP  -For pacemaker placement today    CKD  -Monitor labs, BMP daily  -Avoid nephrotoxins    HIV  -continue home medications     Medication for other comorbidities continue as appropriate dose adjustment as necessary. DVT prophylaxis PCD's  PT OT  Discharge planning    Case discussed with attending and agreed upon plan of care.     Code status: Full  Requires Inpatient level of care  LYRIC Stallings - CNP    3:29 PM  7/1/2022     Above note edited to reflect my thoughts   Patient to have pacemaker placement today  Has a known history of third-degree heart block however has refused pacemaker placement in the past  This time he has agreed  No acute complaints on my evaluation  Probable discharge tomorrow if he gets pacer today  Monitor telemetry overnight  Recheck BUN/creatinine in a.m. I personally saw, examined and provided care for the patient. Radiographs, labs and medication list were reviewed by me independently. The case was discussed in detail and plans for care were established. Review of LYRIC Vizcaino-PAULY   , documentation was conducted and revisions were made as appropriate directly by me. I agree with the above documented exam, problem list, and plan of care.      Niki Richards MD  9:07 PM  7/1/2022

## 2022-07-01 NOTE — PROGRESS NOTES
Physical Therapy  Chart reviewed. Upon arrival, pt receiving care form other healthcare worker. Will attempt again as schedule permits.     Alice Colindres., PT, DPT  [id number]

## 2022-07-01 NOTE — ANESTHESIA POSTPROCEDURE EVALUATION
Department of Anesthesiology  Postprocedure Note    Patient: Fern Rosa  MRN: [id number]  YOB: 1965  Date of evaluation: 7/1/2022      Procedure Summary     Date: 07/01/22 [place] [facility]    Anesthesia Start: 1629 Anesthesia Stop: 6241    Procedure: PACEMAKER IMPLANT W/ANES Diagnosis:     Scheduled Providers: LYRIC Fraser CRNA Responsible Provider: Shayy Murillo MD    Anesthesia Type: MAC ASA Status: 3          Anesthesia Type: No value filed.     Rashmi Phase I:      Rashmi Phase II:        Anesthesia Post Evaluation    Patient location during evaluation: PACU  Patient participation: complete - patient participated  Level of consciousness: awake  Pain score: 0  Airway patency: patent  Nausea & Vomiting: no nausea  Complications: no  Cardiovascular status: hemodynamically stable  Respiratory status: acceptable  Hydration status: stable  Multimodal analgesia pain management approach

## 2022-07-01 NOTE — ANESTHESIA PRE PROCEDURE
Department of Anesthesiology  Preprocedure Note       Name:  Rubina Alberto   Age:  60 y.o.  :  1965                                          MRN:  [id number]         Date:  2022      Surgeon: * Surgery not found *    Procedure:     Medications prior to admission:   Prior to Admission medications    Medication Sig Start Date End Date Taking?  Authorizing Provider   atorvastatin (LIPITOR) 40 MG tablet Take 1 tablet by mouth nightly 22   Maurice Carcamo DO   aspirin 81 MG chewable tablet Take 1 tablet by mouth daily 22   Maurice Carcamo DO   labetalol (NORMODYNE) 100 MG tablet Take 100 mg by mouth 2 times daily  21   Historical Provider, MD   Jqjzpke-Exhat-Ywuprsjg-TenofAF (GENVOYA) [phone] MG TABS Take 1 tablet by mouth every evening    Historical Provider, MD   Vitamin D, Ergocalciferol, 50 MCG ( UT) CAPS Take 2,000 Units by mouth daily daily 18   Historical Provider, MD   amLODIPine (NORVASC) 10 MG tablet Take 10 mg by mouth every morning     Historical Provider, MD   hydrALAZINE (APRESOLINE) 100 MG tablet Take 100 mg by mouth 3 times daily     Historical Provider, MD       Current medications:    Current Facility-Administered Medications   Medication Dose Route Frequency Provider Last Rate Last Admin    0.9 % sodium chloride infusion   IntraVENous Continuous Marylene Shaker, APRN - CNP 75 mL/hr at 22 1230 New Bag at 22 1230    amLODIPine (NORVASC) tablet 10 mg  10 mg Oral QAM Maurice Carcamo DO        atorvastatin (LIPITOR) tablet 40 mg  40 mg Oral Nightly Maurice Carcamo DO        elvitegravir-cobicistat-emtricitabine-tenofovir alafenamide (GENVOYA) [phone] MG TABLET 1 tablet  1 tablet Oral QPM Maurice Carcamo DO   1 tablet at 22    hydrALAZINE (APRESOLINE) tablet 100 mg  100 mg Oral TID Juana Monsalve DO        labetalol (NORMODYNE) tablet 100 mg  100 mg Oral BID Juana Monsalve, DO   100 mg at 22 3549  vitamin D (CHOLECALCIFEROL) tablet 2,000 Units  2,000 Units Oral Daily Maurice Alirio Face, DO   2,000 Units at 22 1008    docusate sodium (COLACE) capsule 100 mg  100 mg Oral Nightly Maurice Demarest Face, DO        pantoprazole (PROTONIX) tablet 40 mg  40 mg Oral QAM AC Maurice Alirio Face, DO        acetaminophen (TYLENOL) tablet 650 mg  650 mg Oral Q4H PRN Maurice Demarest Face, DO        trimethobenzamide Clyda Flash) injection 200 mg  200 mg IntraMUSCular Q6H PRN Maurice Demarest Face, DO        aspirin EC tablet 81 mg  81 mg Oral Daily Maurice Alirio Face, DO           Allergies:     Allergies   Allergen Reactions    Penicillins Rash       Problem List:    Patient Active Problem List   Diagnosis Code    Chronic kidney disease, stage II (mild) N18.2    Hypertension I10    Human immunodeficiency virus (HIV) disease ([facility] B20    Chronic hepatitis C without hepatic coma (HCC) D16.0    Umbilical hernia without obstruction and without gangrene K42.9    Heart murmur, systolic Y08.2    History of colon polyps Z86.010    Chest pain R07.9    Angina of effort ([facility] I20.8    Complete heart block ([facility] I44.2       Past Medical History:        Diagnosis Date    Hepatitis C     Had treatment 2017    HIV (human immunodeficiency virus infection) ([facility]     Hypertension        Past Surgical History:        Procedure Laterality Date    COLONOSCOPY N/A 2018    Distal ascending colon polyps ×2 removed with snare polypectomy ×1 and biopsy and cauterized x 1 (both Tubular Adenomas), rectal polyp removed with biopsy cauterization (squamous dysplasia), Dr. Carla Vasquez, [address] COLONOSCOPY  2021    Normal colon, Dr. Carla Vasquez, [address] COLONOSCOPY N/A 2021    COLORECTAL CANCER SCREENING, HIGH RISK performed by Dylan Kinney MD at [place] [address]  2017    Chronic Hepatitis C, [address]    for pyloric stenosis as        Social History:    Social History ALT 22 06/30/2022 01:51 PM       POC Tests: No results for input(s): POCGLU, POCNA, POCK, POCCL, POCBUN, POCHEMO, POCHCT in the last 72 hours. Coags:   Lab Results   Component Value Date/Time    PROTIME 11.4 06/09/2022 12:21 PM    INR 1.1 06/09/2022 12:21 PM    APTT 32.7 06/09/2022 12:21 PM       HCG (If Applicable): No results found for: PREGTESTUR, PREGSERUM, HCG, HCGQUANT     ABGs: No results found for: PHART, PO2ART, YBH7IAW, SXP9PYR, BEART, S1AKDMNY     Type & Screen (If Applicable):  No results found for: LABABO, LABRH    Drug/Infectious Status (If Applicable):  No results found for: HIV, HEPCAB    COVID-19 Screening (If Applicable):   Lab Results   Component Value Date/Time    COVID19 Not Detected 06/18/2021 06:13 AM       EKG 6/30/22  Normal sinus rhythm with PACs. There is evidence of AV dissociation. Left bundle branch block  Abnormal ECG    ECHO 6/11/22   Left Ventricle   Normal left ventricle size and systolic function. Ejection fraction is visually estimated at 55-60%. Septal motion consistent with conduction abnormality . Normal left ventricle wall thickness. Indeterminate diastolic function. Right Ventricle   Normal right ventricular size and function. TAPSE 32 mm. Left Atrium   Left atrium is of normal size. Right Atrium   Normal right atrium size. Mitral Valve   Mild mitral annular calcification. No evidence of mitral valve stenosis. Physiologic and/or trace mitral regurgitation is present. Tricuspid Valve   The tricuspid valve appears structurally normal.   Mild tricuspid regurgitation. RVSP is 37 mmHg. Normal estimated PA systolic pressure. Aortic Valve   Structurally normal aortic valve. The aortic valve is trileaflet. No hemodynamically significant aortic stenosis is present. No evidence of aortic valve regurgitation.       Pulmonic Valve   Pulmonic valve is structurally normal.   Physiologic and/or trace pulmonic regurgitation present. No evidence of pulmonic valve stenosis. Pericardial Effusion   No evidence for hemodynamically significant pericardial effusion. Pleural Effusion   No evidence of pleural effusion. Aorta   Normal aortic root and ascending aorta. Miscellaneous   Dilated Inferior Vena Cava. Inferior Vena Cava, normal respiratory variation. Conclusions      Summary   Normal left ventricle size and systolic function. Ejection fraction is visually estimated at 55-60%. Septal motion consistent with conduction abnormality . Normal left ventricle wall thickness. Indeterminate diastolic function. Normal right ventricular size and function. TAPSE 32 mm. No hemodynamically significant aortic stenosis is present. Mild tricuspid regurgitation. RVSP is 37 mmHg. Normal estimated PA systolic pressure. No evidence for hemodynamically significant pericardial effusion. Cardiac cath 6/13/22  Angiographic Results/findings:  Left Main: No angiographically significant stenosis. LAD: No angiographically significant stenosis. D1: No angiographically significant stenosis. Cx: Dominant vessel. No angiographically significant stenosis. OM1: No angiographically significant stenosis. OM2: No angiographically significant stenosis. OM 3: (PDA) no angiographically significant stenosis. Ramus: No angiographically significant stenosis. RCA: non-Dominant. No angiographically significant stenosis. LV: Normal LV size and systolic function. No wall motion abnormalities.   Ejection fraction 55%    Anesthesia Evaluation  Patient summary reviewed and Nursing notes reviewed no history of anesthetic complications:   Airway: Mallampati: II  TM distance: >3 FB   Neck ROM: full  Mouth opening: > = 3 FB   Dental:    (+) caps  Comment: Pt denies cracked or chipped teeth    Pulmonary:Negative Pulmonary ROS and normal exam  breath sounds clear to auscultation                             Cardiovascular:  Exercise tolerance: poor (<4 METS),   (+) hypertension:, valvular problems/murmurs (murmur):, angina: with exertion, dysrhythmias (3rd degree heart block):,       ECG reviewed  Rhythm: regular  Rate: abnormal  Echocardiogram reviewed         Beta Blocker:  Dose within 24 Hrs        PE comment: bradycardia   Neuro/Psych:   Negative Neuro/Psych ROS              GI/Hepatic/Renal:   (+) hepatitis: C, liver disease:, renal disease: CRI,           Endo/Other:                      ROS comment: HIV Abdominal:             Vascular: negative vascular ROS. Other Findings:           Anesthesia Plan      MAC     ASA 3       Induction: intravenous. Anesthetic plan and risks discussed with patient. Use of blood products discussed with patient whom consented to blood products. Plan discussed with attending and CRNA.                     Romina Tavares RN   7/1/2022

## 2022-07-01 NOTE — PROGRESS NOTES
Occupational Therapy    Occupational Therapy  OCCUPATIONAL THERAPY INITIAL EVALUATION    [facility] [address]  [address] [place] [address]  Patient Name: Rubina Alberto  MRN: [id number]  : 1965  Room: [address] [place] [address]    Evaluating OT: Gaye Chang OTR/L #[id number]     Referring Provider: Juana Monsalve DO  Specific Provider Orders/Date: OT eval and treat 22    AM PAC:   G-code:  509 [address]  Recommended Adaptive Equipment: none    Diagnosis: Complete heart block ([facility] [I44.2]  Third degree heart block ([facility] [I44.2]      Past Medical History:   Past Medical History:   Diagnosis Date    Hepatitis C     Had treatment 2017    HIV (human immunodeficiency virus infection) ([facility]     Hypertension       Home Living: Pt lives alone in a 1 story home wih 2 BELKYS and 1 hand rail    Bathroom setup: tub/shower combo  Equipment owned: none    Prior Level of Function: Independent with ADLs; Independent with IADLs; ambulated without AD  Driving: yes  Occupation: employed    Pain Level: pt denies pain this session    Cognition: oriented x 4  Additional Comments: Pleasant and cooperative.   Followed commands     Sensory:   Hearing: wfl  Vision: wfl    Glasses: yes [] no [] reading []      UE Assessment:  Hand Dominance: Right []  Left []     Strength ROM Additional Info:    RUE  5/5 wfl good  and wfl FMC/dexterity noted during ADL tasks     LUE 5/5 wfl good  and wfl FMC/dexterity noted during ADL tasks   Sensation: wfl  Tone: wfl  Edema: none noted    Functional Assessment:   Current Status  Comments   Feeding  indep    Grooming  indep    Upper Body Dressing indep     Lower Body Dressing indep    Bathing indep    Toileting  indep    Bed Mobility  NT    Functional Transfers indep    Functional Mobility indep Ambulated in room and hallway without AD     Sit balance: indep  Stand balance: indep  Endurance/Activity tolerance: G Comments: Upon arrival pt seated at EOB and agreeable to OT Session. At end of session pt seated at EOB with all devices within reach, all lines and tubes intact. Assessment of current deficits   Functional mobility []  ROM [] Strength []  Cognition []  ADLs []   IADLs [] Safety Awareness [] Endurance []  Fine Motor Coordination [] Balance [] Vision/perception [] Sensation []   Gross Motor Coordination []     Eval Complexity: low    (Evaluation time includes thorough review of current medical information, gathering information on past medical history/social history and prior level of function, completion of standardized testing/informal observation of tasks, assessment of data, and development of POC/Goals.)    Treatment frequency: evaluation only    Plan of Care:  ADL retraining []   Equipment needs []   Neuromuscular re-education [] Energy Conservation Techniques []  Functional Transfer training [] Patient and/or Family Education []  Functional Mobility training []  Environmental Modifications []  Cognitive re-training []   Compensatory techniques for ADLs []  Splinting Needs []   Positioning to improve overall function []  Other: []      Rehab Potential: Good    Patient / Family Goal: Return home    Short term goals  Time Frame: Evaluation only - Skilled OT services not indicated    Patient and/or family understands diagnosis, prognosis and plan of care: yes    [] Malnutrition indicators have been identified and nursing has been notified to ensure a dietitian consult is ordered.      Time in: 915  Time out: 930  Total Time: 15 minutes    Leonel Homans OTR/L #[id number]

## 2022-07-01 NOTE — CONSULTS
[address] and [address] Electrophysiology  Consultation Report  PATIENT: Sheryl Benito  MEDICAL RECORD NUMBER: [id number]  DATE OF SERVICE:  7/1/2022  ATTENDING ELECTROPHYSIOLOGIST: Anton Duarte MD  PRIMARY ELECTROPHYSIOLOGIST: Catarina Gooden  REFERRING PHYSICIAN: No ref. provider found and Veronica Lr MD  CHIEF COMPLAINT:     HPI: This is a 60 y.o. male with a history of hypertension, chronic kidney disease, chronic left bundle branch block ,chronic hepatitis C as well as HIV infections who presented to the hospital approximately 2 weeks ago with symptoms of exertional chest discomfort and dyspnea. He underwent a complete cardiac work-up as detailed below. His cardiac work-up was unremarkable with a negative cardiac MRI and angiography. He refused pacemaker implantation at that time and signed out AMA. He says that he wanted to go [facility] for [facility] opinion and discussed all his options with his primary care physician Dr. Kaity Bonds. He started noticing symptoms of palpitations as well as shortness of breath and also noticed uncontrolled hypertension at home and at work. He therefore presented to the emergency room. He denies syncope or near syncope.       Patient Active Problem List    Diagnosis Date Noted    Complete heart block ([address] 06/30/2022     Priority: Medium    Angina of effort [facility])      Priority: Medium    Chest pain 06/09/2022     Priority: Medium    History of colon polyps 12/11/2018     Overview Note:     12/11/2018 colonoscopy - distal ascending tubular adenomas removed with snare polypectomy and biopsy and cauterized, rectal polyp removed with biopsy/cauterization (squamous dysplasia), Dr[facility] Rlaph Tolbert, [facility], recommended repeat colonoscopy in 1 year      Umbilical hernia without obstruction and without gangrene 11/08/2018    Heart murmur, systolic 53/44/8785    Hypertension 06/15/2018    Chronic kidney disease, stage II (mild) 04/09/2018 Overview Note:     Diagnosis updated to problem list by Jacoby Choe RPh 2018 1:59 PM, due to new billing requirements, based on transcribed order from Dr. Brynn Daniel.         Human immunodeficiency virus (HIV) disease ([facility] 10/18/2016    Chronic hepatitis C without hepatic coma ([facility] 10/18/2016       Past Medical History:   Diagnosis Date    Hepatitis C     Had treatment     HIV (human immunodeficiency virus infection) ([facility]     Hypertension        Family History   Problem Relation Age of Onset    High Blood Pressure Father     Diabetes Father     Heart Disease Father        Social History     Tobacco Use    Smoking status: Former Smoker     Packs/day: 0.10     Types: Cigarettes     Start date: 6/15/1979     Quit date: 6/15/1995     Years since quittin.0    Smokeless tobacco: Never Used    Tobacco comment: social smoker   Substance Use Topics    Alcohol use: Yes     Comment: occassionally       Current Facility-Administered Medications   Medication Dose Route Frequency Provider Last Rate Last Admin    0.9 % sodium chloride infusion   IntraVENous Continuous Camille Pollen, APRN - CNP        amLODIPine (NORVASC) tablet 10 mg  10 mg Oral QAM Darrell Ruven Klinefelter, DO        atorvastatin (LIPITOR) tablet 40 mg  40 mg Oral Nightly Maurice Fariaer, DO        elvitegravir-cobicistat-emtricitabine-tenofovir alafenamide (GENVOYA) [phone] MG TABLET 1 tablet  1 tablet Oral QPM Darrell Ruven Klinefelter, DO   1 tablet at 22    hydrALAZINE (APRESOLINE) tablet 100 mg  100 mg Oral TID Sampson Abu, DO        labetalol (NORMODYNE) tablet 100 mg  100 mg Oral BID Mireya Abu, DO   100 mg at 22 2322    vitamin D (CHOLECALCIFEROL) tablet 2,000 Units  2,000 Units Oral Daily Darrell Ruven Klinefelter, DO   2,000 Units at 22 1008    docusate sodium (COLACE) capsule 100 mg  100 mg Oral Nightly Maurice Fariaer, DO        pantoprazole (PROTONIX) tablet 40 mg  40 mg Oral QAM AC Maurice Hickman, DO        acetaminophen (TYLENOL) tablet 650 mg  650 mg Oral Q4H PRN Maurice Hickman, DO        trimethobenzamide Perez Wanda) injection 200 mg  200 mg IntraMUSCular Q6H PRN Kanchan Carlin,         aspirin EC tablet 81 mg  81 mg Oral Daily Maurice Hickman, DO            Allergies   Allergen Reactions    Penicillins Rash       ROS:   Constitutional: Negative for fever, activity change and appetite change. HENT: Negative for epistaxis. Eyes: Negative for diploplia, blurred vision. Respiratory: Negative for cough, chest tightness, shortness of breath and wheezing. Cardiovascular: pertinent positives in HPI  Gastrointestinal: Negative for abdominal pain and blood in stool. All other review of systems are negative     PHYSICAL EXAM:   Vitals:    06/30/22 2000 06/30/22 2315 07/01/22 0628 07/01/22 0800   BP: 129/81 119/81  (!) 116/95   Pulse: 82 82  98   Resp: 18 18  16   Temp: 97.8 °F (36.6 °C) 97.3 °F (36.3 °C)  98.1 °F (36.7 °C)   TempSrc: Oral Oral  Oral   SpO2: 100% 99%  99%   Weight:   190 lb (86.2 kg)    Height:          Constitutional: Well-developed, no acute distress  Eyes: conjunctivae normal, no xanthelasma   Ears, Nose, Throat: oral mucosa moist, no cyanosis   CV: no JVD. Regular rate and rhythm. Normal S1S2 and no S3. No murmurs, rubs, or gallops.  PMI is nondisplaced  Lungs: clear to auscultation bilaterally, normal respiratory effort without used of accessory muscles  Abdomen: soft, non-tender, bowel sounds present, no masses or hepatomegaly   Musculoskeletal: no digital clubbing, no edema   Skin: warm, no rashes     I have personally reviewed the laboratory, cardiac diagnostic and radiographic testing as outlined below:    Data:    Recent Labs     06/30/22  1351   WBC 6.4   HGB 13.9   HCT 42.0        Recent Labs     06/30/22  1351 07/01/22  0550    138   K 4.2 3.7    102   CO2 23 19*   BUN 16 19   CREATININE 1.4* 1.7*   CALCIUM 10.2 10.0 Lab Results   Component Value Date/Time    MG 2.0 06/30/2022 01:51 PM     No results for input(s): TSH in the last 72 hours. No results for input(s): INR in the last 72 hours. CXR:   FINDINGS:   The lungs are without acute focal process.  There is no effusion or   pneumothorax. The cardiomediastinal silhouette is without acute process. The   osseous structures are without acute process.           Impression   No acute process. Telemetry: NSR with intermittent complete heart block  EKG: NSR with complete heart block    Echocardiogram: 6/11/22      Summary   Normal left ventricle size and systolic function.   Ejection fraction is visually estimated at 55-60%.  Septal motion consistent with conduction abnormality .   Normal left ventricle wall thickness.   Indeterminate diastolic function.   Normal right ventricular size and function. TAPSE 32 mm.   No hemodynamically significant aortic stenosis is present.   Mild tricuspid regurgitation.  RVSP is 37 mmHg. Normal estimated PA systolic pressure.   No evidence for hemodynamically significant pericardial effusion       Cardiac Catheterization:  Cardiac catheterization on 6/13/2022  Angiographic Results/findings:  Left Main: No angiographically significant stenosis. LAD: No angiographically significant stenosis. D1: No angiographically significant stenosis. Cx: Dominant vessel.  No angiographically significant stenosis. OM1: No angiographically significant stenosis. OM2: No angiographically significant stenosis. OM 3: (PDA) no angiographically significant stenosis. Ramus: No angiographically significant stenosis. RCA: non-Dominant.  No angiographically significant stenosis. LV: Normal LV size and systolic function.  No wall motion abnormalities.  Ejection fraction 55%       Cardiac MRI  6/16/22    Impression           1.  Visually, the left ventricle is normal in size and systolic   function with estimated left ventricular ejection fraction of 55-60%.       2. There is mild concentric left ventricular hypertrophy with   posterior and septal wall thickness of 1.2 cm.       3. Visually, the right ventricle is normal in size and systolic   function       4. There is no definite evidence of significant delay gadolinium   enhancement to suggest myocardial scar or fibrosis.               I have independently reviewed all of the ECGs and rhythm strips per above     Assessment/Plan:     1. Complete heart block with a history of chronic left bundle branch block. Patient presented with symptoms of chest pain and dyspnea on exertion 2 weeks ago but refused a pacemaker implant  Returns to the hospital with similar symptoms and is now agreeable for a pacemaker    2. Cardiac work-up including echocardiography and cardiac MRI were done remarkable for significant structural heart disease. Normal coronary angiography. 3.  Chronic kidney disease    4.hypertension      5. Chronic HIV disease. Patient on suppressive therapy    6. Chronic hepatitis C       Recommendations:    Proceed with pacemaker implantation today. Procedure discussed with him in detail. Risks and benefits as well as alternatives explained at length. He understands and wishes to proceed. Risks including those of pericardial tamponade, deep venous thrombosis, pneumothorax, infection possible mechanical malfunction all discussed at length. He understands and wishes to proceed. I have spent a total of 60 minutes with the patient and the family reviewing the above stated recommendations. And a total of >50% of that time involved face-to-face time providing counseling and or coordination of care with the other providers, reviewing records/tests, counseling/education of the patient, ordering medications/tests/procedures, coordinating care, and documenting clinical information in the EHR. Thank you for allowing me to participate in your patient's care.   Please call me if there are any questions or concerns.       Basil Jiménez [facility]  Cardiac Electrophysiology  [facility]  [facility] [place]: [facility] Electrophysiology  11:08 AM  7/1/2022

## 2022-07-01 NOTE — CARE COORDINATION
Care Coordination: attempted to visit pt to discuss transition of care upon discharge, pt currently in Cath lab. Plan for insertion of dual chamber pacer for heart block. Listed as following with pcp Dr Hadley Aguirre. [employer] insurance.  Unknown pharmacy    Kiki Quijano

## 2022-07-01 NOTE — OP NOTE
[address] [facility]  Procedure Report  PATIENT: Rubina Shape  MEDICAL RECORD NUMBER: [id number]  DATE OF PROCEDURE:  7/1/2022  ATTENDING ELECTROPHYSIOLOGIST:Ed Logan MD  REFERRING PHYSICIAN: Dr. Maryan Obrien    Procedure Performed:  1. Insertion of Dual Chamber Permanent Pacemaker (Medtronic- MRI conditional)  2. Left upper extremity venography  3. Fluoroscopy    Indication for Procedure:  1. Intermittent complete AV block  2. History of HIV and Hepatitis C infections    Flouroscopy: 78.6 min  Complications: none immediately apparent  EBL: minimal  Specimens: none  Contrast: 10 cc    FINDINGS:  Implanted device information:  1. Pulse Generator is a Medtronic. Serial # [id number]  Placement: Left pectoral subcutaneous  Date of implant: 7/1/2022  2. Right atrial lead parameters are as follows:  Medtronic Model # [id number]. Serial # [id number]  Lead position: RA  Date of implant: 7/1/2022  P-waves: 4.8 mV  Pacing threshold: 0.5 V at 0.4 ms. Impedance: 684 ohms. 3. Right ventricular lead parameters are as follows:  Medtronic Model [id number]. Serial # [id number]  Lead position: RV  Date of implant: 7/1/2022  R-waves: 7.4 mV  Pacing threshold: 0.75 V at 0.4 ms. Impedance: 722 ohms. 4. Bradycardia parameters:  Mode: AAIR <-> DDDR  Base Rate: 60  AV delay: 300/270  Max Tracking Rate: 130    DETAILS OF THE OPERATION: The risks, benefits, alternatives of the procedure were explained to patient and family. They consented and agreed to proceed. Written consent was obtained in the chart. Blood products are not routinely needed for such procedures. The patient was brought to the Electrophysiology lab in a fasting and non-sedated state. The patient had electrocardiographic and hemodynamic monitoring equipment placed. During the case the patient was under the care of an anesthesiologist/CRNA team for sedation and hemodynamic monitoring.  A final time out was performed prior to beginning the procedure. The patient was prepped and draped in usual sterile fashion. The left subclavian venogram was performed and showed patent subclavian vein. Twenty mL of 2% lidocaine was used to anesthetize the left pectoral area. Using a #10 blade a 4-cm incision was made below the left clavicle. Using combination of manual dissection and electrocautery, the pacemaker pocket was created to approximate the size of the patient's device. Hemostasis was achieved with electrocautery and confirmed visually. Using a modified Seldinger technique and a fluroscopic guidance, 2 guide wires were placed in the left subclavian vein. Over one of the short J-tipped guide wire, a 7-Fr Safe-sheath was passed. Through this, the right ventricular lead was advanced with difficulty, due to the patient's anatomy, to the right ventricular apical septum using a combination of straight and curved stylets and under fluoroscopic guidance. Mapping of the lead was performed and the lead parameters were deemed to be adequate. The lead was then actively fixated in place. The 7-Fr Safe-sheath was then removed. The lead was then sewn to the pre-pectoral fascia using 0 Ethibond sutures, suturing over the suture sleeve. Over the retained guide-wire an additional 7-Sudanese sheath was passed. Through this, the right atrial lead was advanced without difficulty to the right atrial appendage using a combination of straight and curved stylets and under fluoroscopic guidance. Mapping of the lead was performed. Lead parameters were deemed to be adequate and lead was then actively fixated in place. The 7-Fr Safe- sheath was then removed. The lead was then sewn to the pre-pectoral fascia using 0 Ethibond sutures, suturing over the suture sleeve. The device pocket was then irrigated with antibiotic solution. The leads were then attached to the appropriate binding posts on the header of the device.  The set screws were then tightened with a torque wrench. Tug tests were performed on both leads to insure they are adequately fixated. The device and the leads were then placed within the newly formed device pocket. Lead parameters were then rechecked via the device and deemed to be adequate. The pulse generator was also tied to the pre-pectoral fascia using 0-Ethibond. The incision was closed with 3 layers of absorbable suture, Vicryl. The deepest of which was a 2-0 interrupted stitch followed by a 2nd layer of 3-0 running stitch performed perpendicular to the deep layer and, lastly, a 4-0 subcuticular stitch. The skin was then prepped with benzoin solution, Steri-Strips, and island dressing were then applied. At the end of the case, the patient was hemodynamically stable and under the care of the anesthesiologist, the patient was brought back to the recovery area for post procedural monitoring. ASSESSMENT:  1. Successful implantation of a Medtronic dual chamber permanent pacemaker. RECOMMENDATIONS:  1. Stat portable chest x-ray to rule out pneumothorax and check lead placement now and tomorrow morning. 2. EKG to be performed now. 3. Post-procedural monitoring in the recovery area. 4. The patient will be observed overnight on Telemetry. 5. The patient will receive 24-hours of jennifer-operative intravenous antibiotics. 6. The patient's device will be interrogated in the morning by a representative of the device . 7. The patient is to follow-up in device clinic within 2 weeks upon discharge. 8. The patient is advised follow all post-operative device and wound care instructions as per the information provided in the pre-operative clinic.     Kelsi Gutierrez MD  Cardiac Electrophysiology  [address]   [facility] [place] [place] [facility]  12:09 PM  7/1/2022

## 2022-07-02 ENCOUNTER — APPOINTMENT (OUTPATIENT)
Dept: GENERAL RADIOLOGY | Age: 57
DRG: 243 | End: 2022-07-02
Payer: COMMERCIAL

## 2022-07-02 VITALS
HEART RATE: 104 BPM | OXYGEN SATURATION: 98 % | BODY MASS INDEX: 29.82 KG/M2 | WEIGHT: 190 LBS | DIASTOLIC BLOOD PRESSURE: 92 MMHG | SYSTOLIC BLOOD PRESSURE: 123 MMHG | TEMPERATURE: 97.7 F | RESPIRATION RATE: 18 BRPM | HEIGHT: 67 IN

## 2022-07-02 PROBLEM — Z95.0 S/P PLACEMENT OF CARDIAC PACEMAKER: Status: ACTIVE | Noted: 2022-07-02

## 2022-07-02 LAB
ANION GAP SERPL CALCULATED.3IONS-SCNC: 17 MMOL/L (ref 7–16)
BUN BLDV-MCNC: 20 MG/DL (ref 6–20)
CALCIUM SERPL-MCNC: 9.5 MG/DL (ref 8.6–10.2)
CHLORIDE BLD-SCNC: 101 MMOL/L (ref 98–107)
CO2: 17 MMOL/L (ref 22–29)
CREAT SERPL-MCNC: 1.8 MG/DL (ref 0.7–1.2)
GFR AFRICAN AMERICAN: 47
GFR NON-AFRICAN AMERICAN: 47 ML/MIN/1.73
GLUCOSE BLD-MCNC: 101 MG/DL (ref 74–99)
HCT VFR BLD CALC: 38.4 % (ref 37–54)
HEMOGLOBIN: 12.7 G/DL (ref 12.5–16.5)
MAGNESIUM: 2 MG/DL (ref 1.6–2.6)
MCH RBC QN AUTO: 32.2 PG (ref 26–35)
MCHC RBC AUTO-ENTMCNC: 33.1 % (ref 32–34.5)
MCV RBC AUTO: 97.2 FL (ref 80–99.9)
PDW BLD-RTO: 11.9 FL (ref 11.5–15)
PLATELET # BLD: 300 E9/L (ref 130–450)
PMV BLD AUTO: 10.2 FL (ref 7–12)
POTASSIUM SERPL-SCNC: 3.6 MMOL/L (ref 3.5–5)
RBC # BLD: 3.95 E12/L (ref 3.8–5.8)
SODIUM BLD-SCNC: 135 MMOL/L (ref 132–146)
WBC # BLD: 4.8 E9/L (ref 4.5–11.5)

## 2022-07-02 PROCEDURE — 6370000000 HC RX 637 (ALT 250 FOR IP): Performed by: NURSE PRACTITIONER

## 2022-07-02 PROCEDURE — 6370000000 HC RX 637 (ALT 250 FOR IP): Performed by: INTERNAL MEDICINE

## 2022-07-02 PROCEDURE — 2580000003 HC RX 258: Performed by: INTERNAL MEDICINE

## 2022-07-02 PROCEDURE — 93280 PM DEVICE PROGR EVAL DUAL: CPT | Performed by: INTERNAL MEDICINE

## 2022-07-02 PROCEDURE — 83735 ASSAY OF MAGNESIUM: CPT

## 2022-07-02 PROCEDURE — 36415 COLL VENOUS BLD VENIPUNCTURE: CPT

## 2022-07-02 PROCEDURE — 85027 COMPLETE CBC AUTOMATED: CPT

## 2022-07-02 PROCEDURE — 6360000002 HC RX W HCPCS: Performed by: INTERNAL MEDICINE

## 2022-07-02 PROCEDURE — 99024 POSTOP FOLLOW-UP VISIT: CPT | Performed by: INTERNAL MEDICINE

## 2022-07-02 PROCEDURE — 80048 BASIC METABOLIC PNL TOTAL CA: CPT

## 2022-07-02 PROCEDURE — 71045 X-RAY EXAM CHEST 1 VIEW: CPT

## 2022-07-02 RX ORDER — PANTOPRAZOLE SODIUM 40 MG/1
40 TABLET, DELAYED RELEASE ORAL
Qty: 30 TABLET | Refills: 0 | Status: SHIPPED | OUTPATIENT
Start: 2022-07-03 | End: 2022-07-02

## 2022-07-02 RX ORDER — DOXYCYCLINE HYCLATE 100 MG
100 TABLET ORAL 2 TIMES DAILY
Qty: 14 TABLET | Refills: 0 | Status: SHIPPED | OUTPATIENT
Start: 2022-07-02 | End: 2022-07-09

## 2022-07-02 RX ORDER — METOPROLOL SUCCINATE 50 MG/1
50 TABLET, EXTENDED RELEASE ORAL DAILY
Qty: 90 TABLET | Refills: 3 | Status: SHIPPED | OUTPATIENT
Start: 2022-07-02

## 2022-07-02 RX ORDER — METOPROLOL SUCCINATE 50 MG/1
50 TABLET, EXTENDED RELEASE ORAL DAILY
Qty: 90 TABLET | Refills: 3 | Status: SHIPPED | OUTPATIENT
Start: 2022-07-02 | End: 2022-07-02

## 2022-07-02 RX ORDER — DOXYCYCLINE HYCLATE 100 MG
100 TABLET ORAL 2 TIMES DAILY
Qty: 14 TABLET | Refills: 0 | Status: SHIPPED | OUTPATIENT
Start: 2022-07-02 | End: 2022-07-02 | Stop reason: SDUPTHER

## 2022-07-02 RX ORDER — CHOLECALCIFEROL (VITAMIN D3) 50 MCG
2000 TABLET ORAL DAILY
Qty: 30 TABLET | Refills: 0 | Status: SHIPPED | OUTPATIENT
Start: 2022-07-03

## 2022-07-02 RX ORDER — HYDROCODONE BITARTRATE AND ACETAMINOPHEN 5; 325 MG/1; MG/1
1 TABLET ORAL ONCE
Status: COMPLETED | OUTPATIENT
Start: 2022-07-02 | End: 2022-07-02

## 2022-07-02 RX ORDER — METOPROLOL SUCCINATE 50 MG/1
50 TABLET, EXTENDED RELEASE ORAL DAILY
Status: DISCONTINUED | OUTPATIENT
Start: 2022-07-02 | End: 2022-07-02 | Stop reason: HOSPADM

## 2022-07-02 RX ORDER — PANTOPRAZOLE SODIUM 40 MG/1
40 TABLET, DELAYED RELEASE ORAL
Qty: 30 TABLET | Refills: 0 | Status: SHIPPED | OUTPATIENT
Start: 2022-07-03

## 2022-07-02 RX ADMIN — Medication 2000 UNITS: at 09:08

## 2022-07-02 RX ADMIN — ACETAMINOPHEN 325MG 650 MG: 325 TABLET ORAL at 01:19

## 2022-07-02 RX ADMIN — ASPIRIN 81 MG: 81 TABLET, COATED ORAL at 09:08

## 2022-07-02 RX ADMIN — METOPROLOL SUCCINATE 50 MG: 50 TABLET, EXTENDED RELEASE ORAL at 14:13

## 2022-07-02 RX ADMIN — AMLODIPINE BESYLATE 10 MG: 10 TABLET ORAL at 09:07

## 2022-07-02 RX ADMIN — SODIUM CHLORIDE, PRESERVATIVE FREE 10 ML: 5 INJECTION INTRAVENOUS at 09:09

## 2022-07-02 RX ADMIN — HYDRALAZINE HYDROCHLORIDE 100 MG: 50 TABLET, FILM COATED ORAL at 14:13

## 2022-07-02 RX ADMIN — HYDRALAZINE HYDROCHLORIDE 100 MG: 50 TABLET, FILM COATED ORAL at 09:07

## 2022-07-02 RX ADMIN — SODIUM CHLORIDE: 9 INJECTION, SOLUTION INTRAVENOUS at 09:13

## 2022-07-02 RX ADMIN — PANTOPRAZOLE SODIUM 40 MG: 40 TABLET, DELAYED RELEASE ORAL at 06:40

## 2022-07-02 RX ADMIN — VANCOMYCIN HYDROCHLORIDE 1000 MG: 1 INJECTION, POWDER, LYOPHILIZED, FOR SOLUTION INTRAVENOUS at 05:11

## 2022-07-02 RX ADMIN — HYDROCODONE BITARTRATE AND ACETAMINOPHEN 1 TABLET: 5; 325 TABLET ORAL at 05:32

## 2022-07-02 ASSESSMENT — PAIN DESCRIPTION - PAIN TYPE: TYPE: SURGICAL PAIN

## 2022-07-02 ASSESSMENT — PAIN SCALES - GENERAL
PAINLEVEL_OUTOF10: 8
PAINLEVEL_OUTOF10: 8
PAINLEVEL_OUTOF10: 0
PAINLEVEL_OUTOF10: 0
PAINLEVEL_OUTOF10: 3

## 2022-07-02 ASSESSMENT — PAIN DESCRIPTION - LOCATION: LOCATION: CHEST

## 2022-07-02 ASSESSMENT — PAIN DESCRIPTION - ORIENTATION: ORIENTATION: LEFT

## 2022-07-02 ASSESSMENT — PAIN DESCRIPTION - DESCRIPTORS: DESCRIPTORS: THROBBING

## 2022-07-02 ASSESSMENT — PAIN DESCRIPTION - FREQUENCY: FREQUENCY: CONTINUOUS

## 2022-07-02 NOTE — PROGRESS NOTES
Patient reports nausea from Taylor Braun given this am for pain. Patient reports numerous allergies and allergy list updated. Patient also wanted cardiac medications he refused this am to be updated also (labetelol and atorvastatin). Attending Dr. Benitez Hathaway and EP Dr. Janny Mclain  advised via [address] [place] [address] so med list can be updated prior to d/c possibly later today. 0487  Per Dr. Janny Mclain ok to update. 6146  Per Attending ok to update.     Cesario Hobson RN

## 2022-07-02 NOTE — PROGRESS NOTES
[address] and [address] Electrophysiology  Consultation Report  PATIENT: Barrington Salcedo  MEDICAL RECORD NUMBER: [id number]  DATE OF SERVICE:  7/2/2022  ATTENDING ELECTROPHYSIOLOGIST: Zachery Richter MD  PRIMARY ELECTROPHYSIOLOGIST: Sherwin Grullon  REFERRING PHYSICIAN: No ref. provider found and Viola Quijano MD  CHIEF COMPLAINT:     HPI: This is a 60 y.o. male with a history of hypertension, chronic kidney disease, chronic left bundle branch block ,chronic hepatitis C as well as HIV infections who presented to the hospital approximately 2 weeks ago with symptoms of exertional chest discomfort and dyspnea. He underwent a complete cardiac work-up as detailed below. His cardiac work-up was unremarkable with a negative cardiac MRI and angiography. He refused pacemaker implantation at that time and signed out AMA. He says that he wanted to go to [facility] clinic for second opinion and discussed all his options with his primary care physician Dr. Esmer Hogan. He started noticing symptoms of palpitations as well as shortness of breath and also noticed uncontrolled hypertension at home and at work. He therefore presented to the emergency room. He denies syncope or near syncope. 7/1--dual-chamber pacemaker placed by Dr. Leigh Vides  7/2--patient feels well. Sitting up in bed ambulating without difficulty.   Does not wish to take labetalol or Lipitor      Patient Active Problem List    Diagnosis Date Noted    Third degree heart block ([address] 06/30/2022     Priority: Medium    Angina of effort [facility])      Priority: Medium    Chest pain 06/09/2022     Priority: Medium    History of colon polyps 12/11/2018     Overview Note:     12/11/2018 colonoscopy - distal ascending tubular adenomas removed with snare polypectomy and biopsy and cauterized, rectal polyp removed with biopsy/cauterization (squamous dysplasia), Dr. Cale Jiménez, [facility], recommended repeat colonoscopy in 1 year      Umbilical hernia without obstruction and without gangrene 2018    Heart murmur, systolic     Hypertension 06/15/2018    Chronic kidney disease, stage II (mild) 2018     Overview Note:     Diagnosis updated to problem list by Brigida Pierce [facility] 2018 1:59 PM, due to new billing requirements, based on transcribed order from Dr. Aki Palomino.         Human immunodeficiency virus (HIV) disease ([facility] 10/18/2016    Chronic hepatitis C without hepatic coma ([facility] 10/18/2016       Past Medical History:   Diagnosis Date    Hepatitis C     Had treatment 2017    HIV (human immunodeficiency virus infection) ([facility]     Hypertension        Family History   Problem Relation Age of Onset    High Blood Pressure Father     Diabetes Father     Heart Disease Father        Social History     Tobacco Use    Smoking status: Former Smoker     Packs/day: 0.10     Types: Cigarettes     Start date: 6/15/1979     Quit date: 6/15/1995     Years since quittin.0    Smokeless tobacco: Never Used    Tobacco comment: social smoker   Substance Use Topics    Alcohol use: Yes     Comment: occassionally       Current Facility-Administered Medications   Medication Dose Route Frequency Provider Last Rate Last Admin    0.9 % sodium chloride infusion   IntraVENous Continuous Ed Whitaker MD 75 mL/hr at 22 0913 New Bag at 22 0913    sodium chloride flush 0.9 % injection 5-40 mL  5-40 mL IntraVENous 2 times per day Misael Lane MD   10 mL at 22 0909    sodium chloride flush 0.9 % injection 5-40 mL  5-40 mL IntraVENous PRN Misael Lane MD        0.9 % sodium chloride infusion   IntraVENous PRN Ed Whitaker MD        amLODIPine (NORVASC) tablet 10 mg  10 mg Oral QAM Misael Lane MD   10 mg at 22 4966    atorvastatin (LIPITOR) tablet 40 mg  40 mg Oral Nightly Misael Lane MD        elvitegravir-cobicistat-emtricitabine-tenofovir alafenamide (Grace Pill) [phone] MG TABLET 1 tablet  1 tablet Oral QPM Libra Chance MD   1 tablet at 07/01/22 1846    hydrALAZINE (APRESOLINE) tablet 100 mg  100 mg Oral TID Libra Chance MD   100 mg at 07/02/22 9021    labetalol (NORMODYNE) tablet 100 mg  100 mg Oral BID Libra Chance MD   100 mg at 07/01/22 2012    vitamin D (CHOLECALCIFEROL) tablet 2,000 Units  2,000 Units Oral Daily Libra Chance MD   2,000 Units at 07/02/22 0908    docusate sodium (COLACE) capsule 100 mg  100 mg Oral Nightly Libra Chance MD   100 mg at 07/01/22 2005    pantoprazole (PROTONIX) tablet 40 mg  40 mg Oral QAM AC Libra Chance MD   40 mg at 07/02/22 0640    acetaminophen (TYLENOL) tablet 650 mg  650 mg Oral Q4H PRN Ed Whitaker MD   650 mg at 07/02/22 0119    trimethobenzamide (TIGAN) injection 200 mg  200 mg IntraMUSCular Q6H PRN Ed Whitaker MD        aspirin EC tablet 81 mg  81 mg Oral Daily Ed Whitaker MD   81 mg at 07/02/22 0908        Allergies   Allergen Reactions    Atorvastatin Other (See Comments)     \"Makes me so hyper that I can't sleep\"    Benadryl [Diphenhydramine] Nausea Only    Gluten Diarrhea and Other (See Comments)     Bloating    Labetalol Headaches and Dizziness or Vertigo    Lactose Intolerance (Gi) Diarrhea and Other (See Comments)     Bloating    Norco [Hydrocodone-Acetaminophen] Nausea Only    Oat Nausea Only and Other (See Comments)     Bloating    Penicillins Rash       ROS:   Constitutional: Negative for fever, activity change and appetite change. HENT: Negative for epistaxis. Eyes: Negative for diploplia, blurred vision. Respiratory: Negative for cough, chest tightness, shortness of breath and wheezing. Cardiovascular: pertinent positives in HPI  Gastrointestinal: Negative for abdominal pain and blood in stool.    All other review of systems are negative     PHYSICAL EXAM:   Vitals:    07/01/22 2004 07/02/22 0000 07/02/22 6542 07/02/22 0900   BP: (!) 130/103 115/80 130/88 (!) 135/98   Pulse: 88 82 77 85   Resp: 18 16 16 18   Temp: 97.7 °F (36.5 °C) 97.7 °F (36.5 °C) 97.9 °F (36.6 °C)    TempSrc:   Oral Temporal   SpO2: 99% 98%  99%   Weight:       Height:          Constitutional: Well-developed, no acute distress  Eyes: conjunctivae normal, no xanthelasma   Ears, Nose, Throat: oral mucosa moist, no cyanosis   CV: no JVD or leg edema, regular rate and rhythm. Normal S1S2 and no S3. No murmurs, rubs, or gallops. Lungs: clear to auscultation bilaterally, normal respiratory effort without used of accessory muscles  Abdomen: soft, non-tender, bowel sounds present, no masses or hepatomegaly   Musculoskeletal: no digital clubbing, no edema   Skin: warm, no rashes   Pacemaker site is healing well. No swelling or discharge    I have personally reviewed the laboratory, cardiac diagnostic and radiographic testing as outlined below:    Data:    Recent Labs     06/30/22  1351 07/02/22  0513   WBC 6.4 4.8   HGB 13.9 12.7   HCT 42.0 38.4    300     Recent Labs     06/30/22  1351 07/01/22  0550 07/02/22  0513    138 135   K 4.2 3.7 3.6    102 101   CO2 23 19* 17*   BUN 16 19 20   CREATININE 1.4* 1.7* 1.8*   CALCIUM 10.2 10.0 9.5      Lab Results   Component Value Date/Time    MG 2.0 07/02/2022 05:13 AM     No results for input(s): TSH in the last 72 hours. No results for input(s): INR in the last 72 hours. CXR:   FINDINGS:   The lungs are without acute focal process.  There is no effusion or   pneumothorax. The cardiomediastinal silhouette is without acute process. The   osseous structures are without acute process.           Impression   No acute process. Telemetry: NSR with intermittent complete heart block  EKG: NSR with complete heart block    Echocardiogram: 6/11/22      Summary   Normal left ventricle size and systolic function.   Ejection fraction is visually estimated at 55-60%.    Septal motion consistent with conduction abnormality .   Normal left ventricle wall thickness.   Indeterminate diastolic function.   Normal right ventricular size and function. TAPSE 32 mm.   No hemodynamically significant aortic stenosis is present.   Mild tricuspid regurgitation.  RVSP is 37 mmHg. Normal estimated PA systolic pressure.   No evidence for hemodynamically significant pericardial effusion       Cardiac Catheterization:  Cardiac catheterization on 6/13/2022  Angiographic Results/findings:  Left Main: No angiographically significant stenosis. LAD: No angiographically significant stenosis. D1: No angiographically significant stenosis. Cx: Dominant vessel.  No angiographically significant stenosis. OM1: No angiographically significant stenosis. OM2: No angiographically significant stenosis. OM 3: (PDA) no angiographically significant stenosis. Ramus: No angiographically significant stenosis. RCA: non-Dominant.  No angiographically significant stenosis. LV: Normal LV size and systolic function.  No wall motion abnormalities.  Ejection fraction 55%       Cardiac MRI  6/16/22    Impression           1. Visually, the left ventricle is normal in size and systolic   function with estimated left ventricular ejection fraction of 55-60%.       2. There is mild concentric left ventricular hypertrophy with   posterior and septal wall thickness of 1.2 cm.       3. Visually, the right ventricle is normal in size and systolic   function       4. There is no definite evidence of significant delay gadolinium   enhancement to suggest myocardial scar or fibrosis.              Device evaluation    Make/model Medtronic Tsering XT DR  Mode   MVP R 60- 130  P waves    5.0   mV     Impedance   551      ohms   Pacing threshold 0.5 [email or web address]    msec  R waves    5.5   MV     Impedance    570     ohms   Pacing threshold 0.75 V@   0.4 msec   Pacing percentage  A      1.0%         V   0.4%    Battery longevity   10+ years  Arrhythmias none      Evaluation and reprogramming included   Overall device function is normal  All  device programmable settings were evaluated per above, along with iterative adjustments (capture thresholds) to assess and select the most appropriate final programming for consistent delivery off the appropriate therapy and to verify function of the device. I have independently reviewed all of the ECGs and rhythm strips per above     Assessment/Plan:     1. Complete heart block with a history of chronic left bundle branch block. Patient presented with symptoms of chest pain and dyspnea on exertion 2 weeks ago but refused a pacemaker implant  Now post pacemaker implantation. Satisfactory parameters. Site healing well    2. Cardiac work-up including echocardiography and cardiac MRI were done remarkable for significant structural heart disease. Normal coronary angiography. 3.  Chronic kidney disease    4. Hypertension    Metoprolol added instead of labetalol    5. Chronic HIV disease. Patient on suppressive therapy    6. Chronic hepatitis C       Recommendations:    Discharge home on hydralazine, Norvasc and metoprolol  Patient will continue to monitor his blood pressure at home and adjust dose of hydralazine based on the same  He will discuss his blood pressure medications with Dr. Harsha Nunez, his nephrologist  Discharge instructions regarding incision care, use of left arm and follow-up visits given in detail  Permission to return to work given starting July 18        I have spent a total of 35 minutes with the patient and the family reviewing the above stated recommendations. And a total of >50% of that time involved face-to-face time providing counseling and or coordination of care with the other providers, reviewing records/tests, counseling/education of the patient, ordering medications/tests/procedures, coordinating care, and documenting clinical information in the EHR. Thank you for allowing me to participate in your patient's care. Please call me if there are any questions or concerns.       Basil Stone [facility]  Cardiac Electrophysiology  [facility] ([facility] Physicians  [facility] [place]: [facility] Electrophysiology  12:57 PM  7/2/2022

## 2022-07-02 NOTE — DISCHARGE SUMMARY
[facility]   Discharge summary   Patient ID:  Adrienne Pennington  [id number]  14 y.o.  1965    Admit date: 6/30/2022    Discharge date and time: 7/2/2022    Admission Diagnoses:   Patient Active Problem List   Diagnosis    Chronic kidney disease, stage II (mild)    Hypertension    [facility]) [facility]    Chronic hepatitis C without hepatic coma ([facility]    Umbilical hernia without obstruction and without gangrene    Heart murmur, systolic    History of colon polyps    Chest pain    Angina of effort ([facility]    Third degree heart block ([facility]    S/P placement of cardiac pacemaker       Discharge Diagnoses: Third degree heart block    Consults: EP    Procedures: Pacemaker insertion 7/1/22    Hospital Course: The patient is a 60 y.o. male of Maria D Vieira MD with significant past medical history of Hep C, HIV, HTN who presented with midsternal chest pain, nonradiating. He was recently admitted and discharged for chest pain and was seen by EP and cardio were following in which he had a complete work up. On arrival to emergency patient had an EKG identifying an AV dissociation. EP was consulted and patient was explained risks and benefits of a pacemaker placement. Pacemaker was placed without complication on 4/0/69. Patient discharged home in stable condition.      Recent Labs     06/30/22  1351 07/02/22  0513   WBC 6.4 4.8   HGB 13.9 12.7   HCT 42.0 38.4    300       Recent Labs     06/30/22  1351 07/01/22  0550 07/02/22  0513    138 135   K 4.2 3.7 3.6    102 101   CO2 23 19* 17*   BUN 16 19 20   CREATININE 1.4* 1.7* 1.8*   CALCIUM 10.2 10.0 9.5       XR CHEST PORTABLE    Result Date: 7/1/2022  EXAMINATION: ONE XRAY VIEW OF THE CHEST 7/1/2022 6:23 pm COMPARISON: 30 June 2002 HISTORY: ORDERING SYSTEM PROVIDED HISTORY: Pacemaker placement and rule out pneumothorax TECHNOLOGIST PROVIDED HISTORY: Reason for exam:->Pacemaker placement and rule out pneumothorax What reading provider will be dictating this exam?->CRC FINDINGS: Single AP upright portable chest demonstrates a dual-chamber cardiac pacemaker in place. The cardiac silhouette is normal.  The lungs are clear. The soft tissues and osseous structures appear unremarkable. No acute cardiopulmonary process. XR CHEST PORTABLE    Result Date: 6/30/2022  EXAMINATION: ONE XRAY VIEW OF THE CHEST 6/30/2022 1:34 pm COMPARISON: None. HISTORY: ORDERING SYSTEM PROVIDED HISTORY: chest pain TECHNOLOGIST PROVIDED HISTORY: Reason for exam:->chest pain What reading provider will be dictating this exam?->CRC FINDINGS: The lungs are without acute focal process. There is no effusion or pneumothorax. The cardiomediastinal silhouette is without acute process. The osseous structures are without acute process. No acute process. Discharge Exam:    General Appearance:  awake, alert, oriented, in no acute distress  Head/face:  NCAT  Eyes:  No gross abnormalities. and EOMI  Lungs:  Normal expansion. Clear to auscultation. No rales, rhonchi, or wheezing. Heart:  Heart sounds are normal.  Regular rate and rhythm without murmur, gallop or rub. Chest wall dressing is c/d/i  Abdomen:  Soft, non-tender, normal bowel sounds. No bruits, organomegaly or masses. Extremities: Extremities warm to touch, pink, with no edema.   Neurologic: awake, interactive     Disposition: home     Patient Condition at Discharge: Stable    Patient Instructions:      Medication List      START taking these medications    doxycycline hyclate 100 MG tablet  Commonly known as: VIBRA-TABS  Take 1 tablet by mouth 2 times daily for 7 days     metoprolol succinate 50 MG extended release tablet  Commonly known as: TOPROL XL  Take 1 tablet by mouth daily     pantoprazole 40 MG tablet  Commonly known as: PROTONIX  Take 1 tablet by mouth every morning (before breakfast)  Start taking on: July 3, 2022        CONTINUE taking these medications amLODIPine 10 MG tablet  Commonly known as: NORVASC     aspirin 81 MG chewable tablet  Take 1 tablet by mouth daily     Genvoya [phone] MG TABLET  Generic drug: elvitegravir-cobicistat-emtricitabine-tenofovir alafenamide     hydrALAZINE 100 MG tablet  Commonly known as: APRESOLINE     Vitamin D (Ergocalciferol) 50 MCG (2000 UT) Caps        STOP taking these medications    atorvastatin 40 MG tablet  Commonly known as: LIPITOR     labetalol 100 MG tablet  Commonly known as: Vahe Herrera           Where to Get Your Medications      These medications were sent to [facility]/pharmacy #[id number], [place] - [address]  [address] [facility]    Phone: [phone]   · doxycycline hyclate 100 MG tablet  · metoprolol succinate 50 MG extended release tablet  · pantoprazole 40 MG tablet       Activity: activity as tolerated  Diet: cardiac diet    Pt has been advised to: Follow-up with Devante Guy MD in 1 week.   Follow-up with consultants as recommended by them    Note that over 30 minutes was spent in preparing discharge papers, discussing discharge with patient, medication review, etc.    Signed:  Licha Reddy MD  7/2/2022  2:15 PM

## 2022-07-05 LAB
EKG ATRIAL RATE: 72 BPM
EKG P AXIS: 19 DEGREES
EKG P-R INTERVAL: 318 MS
EKG Q-T INTERVAL: 430 MS
EKG QRS DURATION: 172 MS
EKG QTC CALCULATION (BAZETT): 470 MS
EKG R AXIS: 33 DEGREES
EKG T AXIS: -33 DEGREES
EKG VENTRICULAR RATE: 72 BPM

## 2022-07-07 ENCOUNTER — TELEPHONE (OUTPATIENT)
Dept: CARDIAC CATH/INVASIVE PROCEDURES | Age: 57
End: 2022-07-07

## 2022-07-14 ENCOUNTER — NURSE ONLY (OUTPATIENT)
Dept: NON INVASIVE DIAGNOSTICS | Age: 57
End: 2022-07-14

## 2022-07-14 VITALS — SYSTOLIC BLOOD PRESSURE: 142 MMHG | DIASTOLIC BLOOD PRESSURE: 98 MMHG

## 2022-07-14 DIAGNOSIS — Z95.0 CARDIAC PACEMAKER IN SITU: Primary | ICD-10-CM

## 2022-07-14 DIAGNOSIS — I44.2 COMPLETE ATRIOVENTRICULAR BLOCK (HCC): ICD-10-CM

## 2022-07-14 NOTE — PROGRESS NOTES
NO BILL  [id number]    HPI: This is a 60 y.o. male with a history of:  1. Hypertension   2. Chronic kidney disease   3. Chronic left bundle branch block   4. Chronic hepatitis C as well as HIV infection. 5.  Cardiac work-up was unremarkable with a negative cardiac MRI and angiography. He refused pacemaker implantation at that time and signed out AMA. 6   He says that he wanted to go to [facility] for second opinion and discussed all his options with his primary care physician Dr. Manas Castillo. He started noticing symptoms of palpitations as well as shortness of breath and also noticed uncontrolled hypertension at home and at work. He therefore presented to the emergency room. He denies syncope or near syncope. 6.  Implant of a Medtronic pacemaker [id number], atrial lead is a Medtronic [id number], RV lead is a Medtronic [id number] on 7-1-2022 by Dr. Litzy Mejia. Pacemaker INTERROGATION:  MVPR    1. Battery voltage:   3.21 V  2. Longevity:  12.8 years  3. Impedance: A=418 ohms   V= 399 ohms    4. Amplitude:  P-wave= 7.1 mV   R-wave= 5.9 mV  5. Threshold:  A: 0.5 V @ 0.4 ms     V:1.25 V @ 0.4 ms  6. AP  7.4 %                       40.4 %  7. No episodes detected. ASSESSMENT: Pacemaker  function is appropriate. PLan  1. Pacemaker follow-up in 6 weeks. 2.     Carelink transmission in the future. 3.     Pt aware continuous home monitoring is strongly recommended. 4.     Increase metoprolol to 50 mg BID. Ike Covington M.D., [address] [place] [address]. C. C

## 2022-11-29 RX ORDER — METOPROLOL SUCCINATE 50 MG/1
50 TABLET, EXTENDED RELEASE ORAL 2 TIMES DAILY
Qty: 60 TABLET | Refills: 11 | Status: SHIPPED | OUTPATIENT
Start: 2022-11-29

## 2022-12-30 NOTE — PROGRESS NOTES
Dr. Serena Hickman called and order given for IV hydralazine for elevated blood presssure. Medication given. Patient being taken to floor by ER tech.        Sinyd Story RN  12/30/22 [id number]

## 2023-02-09 NOTE — PROGRESS NOTES
Hospitalist Progress Note      PCP: Kobe Zimmerman MD    Date of Admission: 6/9/2022        Hospital Course:  **60 y. o. male presented with CHEST PAIN, ONSET SEVERAL YEARS  AGO, TIGHTNESS IN CHARACTER, NO NV, SOB OR DIAPHORESIS, POS PALPITATIONS** GIVEN  IMDUR, BECAME HYPOTENSIVE, HAD TO BE GIVEN FLUIDS on 6. 11.  That evening he started to feel hypotensive again ** NM scan showed a large sized severe perfusion defect in the LAD territory , * CATH UNREMARKABLE FOR CARDIAC MRI   ** TOLD THAT HE NEEDS A PACER, HE IS REFUSING   **        Subjective: ADAMANT ABOUT NOT GETTING PACER          Medications:  Reviewed    Infusion Medications    sodium chloride       Scheduled Medications    labetalol  50 mg Oral 2 times per day    LORazepam  0.5 mg IntraVENous Once    docusate sodium  100 mg Oral Nightly    pantoprazole  40 mg Oral QAM AC    amLODIPine  10 mg Oral QAM    hydrALAZINE  100 mg Oral TID    vitamin D  2,000 Units Oral Daily    sodium chloride flush  5-40 mL IntraVENous 2 times per day    aspirin  81 mg Oral Daily    atorvastatin  40 mg Oral Nightly    polyethylene glycol  17 g Oral Daily    elvitegravir-cobicistat-emtricitabine-tenofovir alafenamide  1 tablet Oral Nightly     PRN Meds: acetaminophen, perflutren lipid microspheres, sodium chloride flush, sodium chloride, ondansetron **OR** ondansetron, magnesium hydroxide, hydrOXYzine pamoate      Intake/Output Summary (Last 24 hours) at 6/15/2022 1927  Last data filed at 6/15/2022 1827  Gross per 24 hour   Intake 820 ml   Output --   Net 820 ml       Exam:    /61   Pulse (!) 47   Temp 97.4 °F (36.3 °C) (Temporal)   Resp 18   Ht 5' 7\" (1.702 m)   Wt 195 lb 9.6 oz (88.7 kg)   SpO2 98%   BMI 30.64 kg/m²       General appearance:  No apparent distress  HEENT:  Normal cephalic, atraumatic without obvious deformity. Neck: Supple, with full range of motion.    Respiratory:  CTA  Cardiovascular:  Regular rate and rhythm   Abdomen: Soft, non-tender, non-distended with normal bowel sounds. Musculoskeletal:  No clubbing, cyanosis or edema bilaterally.    Skin: Skin color, texture, turgor normal.  No rashes or lesions. Neurologic:  Neurovascularly intact   Psychiatric:  Alert and oriented, t                 Labs:   Recent Labs     06/15/22  0531   WBC 5.4   HGB 13.3   HCT 40.3        Recent Labs     06/13/22  0625 06/14/22  0602 06/15/22  0531    139 136   K 4.2 3.5 4.0   * 106 105   CO2 23 15* 18*   BUN 12 13 16   CREATININE 1.7* 1.6* 1.7*   CALCIUM 9.2 9.3 9.9     No results for input(s): AST, ALT, BILIDIR, BILITOT, ALKPHOS in the last 72 hours. No results for input(s): INR in the last 72 hours. No results for input(s): Trevor [place] in the last 72 hours. No results for input(s): AST, ALT, ALB, BILIDIR, BILITOT, ALKPHOS in the last 72 hours. No results for input(s): LACTA in the last 72 hours. No results found for: Agnes Kast  No results found for: AMMONIA    Assessment:    Active Hospital Problems    Diagnosis Date Noted    Angina of effort ([facility] [address] [I20.8]      Priority: Medium    Chest pain [R07.9] 06/09/2022     Priority: Medium   HYPOTENSIVE EPISODE AFTER IMDUR  HEPATITIS C   HIV   HTN   CKD 2        PLAN:  Angeles Fudge   NORVASC   HYDRALAZINE   GENVOYA  ASPIRIN   FLUIDS   CARDIAC MRI     DVT Prophylaxis: *LOVENOX  Diet: ADULT DIET; Regular;  Low Sodium (2 gm)  Diet NPO Exceptions are: Sips of Water with Meds  Code Status: Full Code     PT/OT Eval Status: *ORDERED     Dispo - *HOME       Electronically signed by Soha Palacios DO on 6/15/2022 at 7:27 PM [facility] Rituxan Counseling:  I discussed with the patient the risks of Rituxan infusions. Side effects can include infusion reactions, severe drug rashes including mucocutaneous reactions, reactivation of latent hepatitis and other infections and rarely progressive multifocal leukoencephalopathy.  All of the patient's questions and concerns were addressed.

## 2023-07-20 ENCOUNTER — APPOINTMENT (OUTPATIENT)
Dept: GENERAL RADIOLOGY | Age: 58
End: 2023-07-20
Payer: COMMERCIAL

## 2023-07-20 ENCOUNTER — HOSPITAL ENCOUNTER (EMERGENCY)
Age: 58
Discharge: HOME | End: 2023-07-20
Attending: EMERGENCY MEDICINE
Payer: COMMERCIAL

## 2023-07-20 VITALS
OXYGEN SATURATION: 96 % | WEIGHT: 208 LBS | BODY MASS INDEX: 32.65 KG/M2 | RESPIRATION RATE: 18 BRPM | DIASTOLIC BLOOD PRESSURE: 108 MMHG | HEART RATE: 66 BPM | HEIGHT: 67 IN | SYSTOLIC BLOOD PRESSURE: 168 MMHG | TEMPERATURE: 97.9 F

## 2023-07-20 DIAGNOSIS — I10 ESSENTIAL HYPERTENSION: ICD-10-CM

## 2023-07-20 DIAGNOSIS — S16.1XXA ACUTE STRAIN OF NECK MUSCLE, INITIAL ENCOUNTER: ICD-10-CM
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
## 2023-07-20 DIAGNOSIS — S39.012A STRAIN OF LUMBAR REGION, INITIAL ENCOUNTER: Primary | ICD-10-CM

## 2023-07-20 PROCEDURE — 72125 CT NECK SPINE W/O DYE: CPT

## 2023-07-20 PROCEDURE — 6370000000 HC RX 637 (ALT 250 FOR IP): Performed by: EMERGENCY MEDICINE

## 2023-07-20 PROCEDURE — 99284 EMERGENCY DEPT VISIT MOD MDM: CPT

## 2023-07-20 PROCEDURE — 72131 CT LUMBAR SPINE W/O DYE: CPT

## 2023-07-20 PROCEDURE — 71046 X-RAY EXAM CHEST 2 VIEWS: CPT

## 2023-07-20 RX ORDER — CYCLOBENZAPRINE HCL 10 MG
10 TABLET ORAL 3 TIMES DAILY PRN
Qty: 21 TABLET | Refills: 0 | Status: SHIPPED | OUTPATIENT
Start: 2023-07-20 | End: 2023-07-30

## 2023-07-20 RX ORDER — IBUPROFEN 600 MG/1
600 TABLET ORAL ONCE
Status: COMPLETED | OUTPATIENT
Start: 2023-07-20 | End: 2023-07-20

## 2023-07-20 RX ADMIN — IBUPROFEN 600 MG: 600 TABLET, FILM COATED ORAL at 09:48

## 2023-07-20 ASSESSMENT — PAIN - FUNCTIONAL ASSESSMENT: PAIN_FUNCTIONAL_ASSESSMENT: 0-10

## 2023-07-20 ASSESSMENT — PAIN DESCRIPTION - DESCRIPTORS: DESCRIPTORS: ACHING;SORE;THROBBING

## 2023-07-20 ASSESSMENT — PAIN DESCRIPTION - PAIN TYPE: TYPE: ACUTE PAIN

## 2023-07-20 ASSESSMENT — PAIN SCALES - GENERAL
PAINLEVEL_OUTOF10: 5
PAINLEVEL_OUTOF10: 5

## 2023-07-20 ASSESSMENT — PAIN DESCRIPTION - LOCATION: LOCATION: BACK;NECK

## 2023-07-20 NOTE — DISCHARGE INSTRUCTIONS
Please also have your blood pressure rechecked by your physician was mildly elevated 150/100 upon your arrival    You may take Motrin or Tylenol as directed for control of any discomfort    You will also be prescribed Flexeril a muscle relaxant may cause grogginess

## 2023-10-23 ENCOUNTER — TELEPHONE (OUTPATIENT)
Dept: NON INVASIVE DIAGNOSTICS | Age: 58
End: 2023-10-23

## 2023-10-23 NOTE — TELEPHONE ENCOUNTER
----- Message from [place] MD sent at 10/9/2023 11:43 PM EDT -----  Patient has not been seen since implant. Nonsustained VT.   Patient needs beta-blocker started and follow-up as arranged

## 2023-11-29 RX ORDER — METOPROLOL SUCCINATE 50 MG/1
50 TABLET, EXTENDED RELEASE ORAL 2 TIMES DAILY
Qty: 60 TABLET | Refills: 11 | Status: SHIPPED | OUTPATIENT
Start: 2023-11-29

## 2024-01-18 PROCEDURE — 93294 REM INTERROG EVL PM/LDLS PM: CPT | Performed by: INTERNAL MEDICINE

## 2024-01-18 PROCEDURE — 93296 REM INTERROG EVL PM/IDS: CPT | Performed by: INTERNAL MEDICINE

## 2024-02-20 ENCOUNTER — TELEPHONE (OUTPATIENT)
Dept: NON INVASIVE DIAGNOSTICS | Age: 59
End: 2024-02-20

## 2024-02-20 NOTE — TELEPHONE ENCOUNTER
Dr. Mitchell's office called back stating patient hasn't been seen in over a year and there are no recent Stress Test, Echo, or labs.

## 2024-02-23 ENCOUNTER — NURSE ONLY (OUTPATIENT)
Dept: NON INVASIVE DIAGNOSTICS | Age: 59
End: 2024-02-23

## 2024-02-23 ENCOUNTER — OFFICE VISIT (OUTPATIENT)
Dept: NON INVASIVE DIAGNOSTICS | Age: 59
End: 2024-02-23

## 2024-02-23 VITALS
BODY MASS INDEX: 31.61 KG/M2 | HEART RATE: 83 BPM | RESPIRATION RATE: 18 BRPM | DIASTOLIC BLOOD PRESSURE: 94 MMHG | WEIGHT: 201.4 LBS | HEIGHT: 67 IN | SYSTOLIC BLOOD PRESSURE: 142 MMHG

## 2024-02-23 DIAGNOSIS — I47.20 VENTRICULAR TACHYCARDIA (HCC): Primary | ICD-10-CM

## 2024-02-23 DIAGNOSIS — Z95.0 CARDIAC PACEMAKER IN SITU: ICD-10-CM

## 2024-02-23 DIAGNOSIS — I44.2 COMPLETE ATRIOVENTRICULAR BLOCK (HCC): ICD-10-CM

## 2024-02-23 NOTE — PROGRESS NOTES
[facility]  Outpatient progress note  PATIENT: Otto Le  MEDICAL RECORD NUMBER: [id number]  DATE OF SERVICE:  2/23/2024  ATTENDING ELECTROPHYSIOLOGIST: Antonette Mcwilliams MD  PRIMARY ELECTROPHYSIOLOGIST: Anotnette Mcwilliams MD  REFERRING PHYSICIAN: No ref. provider found and Jose Eduardo Mitchell MD  CHIEF COMPLAINT:     HPI: This is a 58 y.o. male with a history of hypertension, chronic kidney disease, chronic left bundle branch block ,chronic hepatitis C as well as HIV infections who presented to the hospital approximately 2 weeks ago with symptoms of exertional chest discomfort and dyspnea.  He underwent a complete cardiac work-up as detailed below.  His cardiac work-up was unremarkable with a negative cardiac MRI and angiography.  He refused pacemaker implantation at that time and signed out AMA.  He says that he wanted to go to [facility] for second opinion and discussed all his options with his primary care physician Dr. Mitchell.  He started noticing symptoms of palpitations as well as shortness of breath and also noticed uncontrolled hypertension at home and at work.  He therefore presented to the emergency room.  He denies syncope or near syncope.    2/23/24: Patient denies any new cardiac symptoms and is here for routine device check.  No cardiac symptoms of chest pain or shortness of breath.  No syncope or near syncope.  No complaints of palpitations either.      Patient Active Problem List    Diagnosis Date Noted    S/P placement of cardiac pacemaker 07/02/2022     Priority: Medium    Third degree heart block (HCC) 06/30/2022     Priority: Medium    Angina of effort      Priority: Medium    Chest pain 06/09/2022     Priority: Medium    History of colon polyps 12/11/2018     Overview Note:     12/11/2018 colonoscopy - distal ascending tubular adenomas removed with snare polypectomy and biopsy and cauterized, rectal polyp removed with

## 2024-03-01 PROBLEM — I47.20 VENTRICULAR TACHYCARDIA (HCC): Status: ACTIVE | Noted: 2024-03-01

## 2024-06-27 ENCOUNTER — TELEPHONE (OUTPATIENT)
Dept: CARDIOLOGY CLINIC | Age: 59
End: 2024-06-27

## 2024-06-27 NOTE — TELEPHONE ENCOUNTER
LEFT VOICE MESSAGE FOR PATIENT TO CALL [facility] .            BRUNO SAHU

## 2024-08-05 ENCOUNTER — TELEPHONE (OUTPATIENT)
Dept: CARDIOLOGY | Age: 59
End: 2024-08-05

## 2024-08-05 NOTE — TELEPHONE ENCOUNTER
CALLED PATIENT AND LEFT MESSAGE TO SCHEDULE ECHO    Electronically signed by Ritu Barrios on 8/5/2024 at 11:58 AM

## 2024-08-28 PROCEDURE — 93296 REM INTERROG EVL PM/IDS: CPT | Performed by: INTERNAL MEDICINE

## 2024-08-28 PROCEDURE — 93294 REM INTERROG EVL PM/LDLS PM: CPT | Performed by: INTERNAL MEDICINE

## 2024-10-04 ENCOUNTER — HOSPITAL ENCOUNTER (OUTPATIENT)
Dept: CARDIOLOGY | Age: 59
Discharge: HOME | End: 2024-10-06
Attending: INTERNAL MEDICINE
Payer: COMMERCIAL

## 2024-10-04 VITALS
HEIGHT: 67 IN | WEIGHT: 204 LBS | DIASTOLIC BLOOD PRESSURE: 94 MMHG | SYSTOLIC BLOOD PRESSURE: 142 MMHG | BODY MASS INDEX: 32.02 KG/M2

## 2024-10-04 DIAGNOSIS — I47.20 VENTRICULAR TACHYCARDIA (HCC): ICD-10-CM

## 2024-10-04 LAB
ECHO AR MAX VEL PISA: 2.9 M/S
ECHO AV AREA PEAK VELOCITY: 3.1 CM2
ECHO AV AREA VTI: 3.3 CM2
ECHO AV AREA/BSA PEAK VELOCITY: 1.5 CM2/M2
ECHO AV AREA/BSA VTI: 1.6 CM2/M2
ECHO AV CUSP MM: 2.2 CM
ECHO AV MEAN GRADIENT: 3 MMHG
ECHO AV MEAN VELOCITY: 0.8 M/S
ECHO AV PEAK GRADIENT: 6 MMHG
ECHO AV PEAK VELOCITY: 1.3 M/S
ECHO AV REGURGITANT PHT: 611 MILLISECOND
ECHO AV VELOCITY RATIO: 0.77
ECHO AV VTI: 21.8 CM
ECHO BSA: 2.09 M2
ECHO EST RA PRESSURE: 3 MMHG
ECHO LA DIAMETER INDEX: 1.37 CM/M2
ECHO LA DIAMETER: 2.8 CM
ECHO LA VOL A-L A2C: 30 ML (ref 18–58)
ECHO LA VOL A-L A4C: 39 ML (ref 18–58)
ECHO LA VOL MOD A2C: 28 ML (ref 18–58)
ECHO LA VOL MOD A4C: 38 ML (ref 18–58)
ECHO LA VOLUME AREA LENGTH: 35 ML
ECHO LA VOLUME INDEX A-L A2C: 15 ML/M2 (ref 16–34)
ECHO LA VOLUME INDEX A-L A4C: 19 ML/M2 (ref 16–34)
ECHO LA VOLUME INDEX AREA LENGTH: 17 ML/M2 (ref 16–34)
ECHO LA VOLUME INDEX MOD A2C: 14 ML/M2 (ref 16–34)
ECHO LA VOLUME INDEX MOD A4C: 19 ML/M2 (ref 16–34)
ECHO LV EF PHYSICIAN: 60 %
ECHO LV FRACTIONAL SHORTENING: 41 % (ref 28–44)
ECHO LV INTERNAL DIMENSION DIASTOLE INDEX: 1.81 CM/M2
ECHO LV INTERNAL DIMENSION DIASTOLIC: 3.7 CM (ref 4.2–5.9)
ECHO LV INTERNAL DIMENSION SYSTOLIC INDEX: 1.08 CM/M2
ECHO LV INTERNAL DIMENSION SYSTOLIC: 2.2 CM
ECHO LV IVSD: 1.5 CM (ref 0.6–1)
ECHO LV MASS 2D: 156.7 G (ref 88–224)
ECHO LV MASS INDEX 2D: 76.8 G/M2 (ref 49–115)
ECHO LV POSTERIOR WALL DIASTOLIC: 1 CM (ref 0.6–1)
ECHO LV RELATIVE WALL THICKNESS RATIO: 0.54
ECHO LVOT AREA: 3.8 CM2
ECHO LVOT AV VTI INDEX: 0.85
ECHO LVOT DIAM: 2.2 CM
ECHO LVOT MEAN GRADIENT: 3 MMHG
ECHO LVOT PEAK GRADIENT: 4 MMHG
ECHO LVOT PEAK VELOCITY: 1 M/S
ECHO LVOT STROKE VOLUME INDEX: 34.5 ML/M2
ECHO LVOT SV: 70.3 ML
ECHO LVOT VTI: 18.5 CM
ECHO MV A VELOCITY: 0.85 M/S
ECHO MV AREA PHT: 3.3 CM2
ECHO MV AREA VTI: 3.9 CM2
ECHO MV E DECELERATION TIME (DT): 193.5 MS
ECHO MV E VELOCITY: 0.57 M/S
ECHO MV E/A RATIO: 0.67
ECHO MV LVOT VTI INDEX: 0.98
ECHO MV MAX VELOCITY: 1 M/S
ECHO MV MEAN GRADIENT: 1 MMHG
ECHO MV MEAN VELOCITY: 0.5 M/S
ECHO MV PEAK GRADIENT: 4 MMHG
ECHO MV PRESSURE HALF TIME (PHT): 65.8 MS
ECHO MV VTI: 18.1 CM
ECHO PV MAX VELOCITY: 0.7 M/S
ECHO PV MEAN GRADIENT: 1 MMHG
ECHO PV MEAN VELOCITY: 0.4 M/S
ECHO PV PEAK GRADIENT: 2 MMHG
ECHO PV VTI: 13.3 CM
ECHO RIGHT VENTRICULAR SYSTOLIC PRESSURE (RVSP): 25 MMHG
ECHO RV INTERNAL DIMENSION: 2.9 CM
ECHO RV TAPSE: 1.8 CM (ref 1.7–?)
ECHO TV REGURGITANT MAX VELOCITY: 2.34 M/S
ECHO TV REGURGITANT PEAK GRADIENT: 22 MMHG

## 2024-10-04 PROCEDURE — 93306 TTE W/DOPPLER COMPLETE: CPT

## 2024-10-04 PROCEDURE — 93306 TTE W/DOPPLER COMPLETE: CPT | Performed by: INTERNAL MEDICINE

## 2024-11-21 RX ORDER — METOPROLOL SUCCINATE 50 MG/1
50 TABLET, EXTENDED RELEASE ORAL 2 TIMES DAILY
Qty: 60 TABLET | Refills: 11 | Status: SHIPPED | OUTPATIENT
Start: 2024-11-21

## 2025-03-03 ENCOUNTER — HOSPITAL ENCOUNTER (OUTPATIENT)
Age: 60
Discharge: HOME | End: 2025-03-05

## 2025-03-03 PROCEDURE — 87077 CULTURE AEROBIC IDENTIFY: CPT

## 2025-03-03 PROCEDURE — 88305 TISSUE EXAM BY PATHOLOGIST: CPT

## 2025-03-04 LAB
HELIOBACTER PYLORI ID: NEGATIVE
SOURCE, 60200063: NORMAL

## 2025-03-06 LAB — SURGICAL PATHOLOGY REPORT: NORMAL

## 2025-06-11 PROCEDURE — 93294 REM INTERROG EVL PM/LDLS PM: CPT | Performed by: INTERNAL MEDICINE

## 2025-06-11 PROCEDURE — 93296 REM INTERROG EVL PM/IDS: CPT | Performed by: INTERNAL MEDICINE

## (undated) DEVICE — PATIENT RETURN ELECTRODE, SINGLE-USE, CONTACT QUALITY MONITORING, ADULT, WITH 9FT CORD, FOR PATIENTS WEIGING OVER 33LBS. (15KG): Brand: MEGADYNE

## (undated) DEVICE — DEFENDO AIR WATER SUCTION AND BIOPSY VALVE KIT FOR  OLYMPUS: Brand: DEFENDO AIR/WATER/SUCTION AND BIOPSY VALVE

## (undated) DEVICE — CONNECTOR IRRIGATION AUXILIARY H2O JET W/ PRT MTL THRD HYDR

## (undated) DEVICE — CANNULA NSL ORAL AD FOR CAPNOFLEX CO2 O2 AIRLFE

## (undated) DEVICE — SNARE ENDOSCP L240CM LOOP W13MM SHTH DIA2.4MM SM OVL FLX

## (undated) DEVICE — GAUZE,SPONGE,POST-OP,4X3,STRL,LF: Brand: MEDLINE

## (undated) DEVICE — Z DISCONTINUED NO SUB IDED TUBING ETCO2 AD L6.5FT NSL ORAL CVD PRNG NONFLARED TIP OVR

## (undated) DEVICE — THE DISPOSABLE ROTH NET FOREIGN BODY STANDARD RETRIEVAL DEVICE IS USED IN THE ENDOSCOPIC RETRIEVAL OF FOREIGN BODY, FOOD BOLUS AND EXCISED TISSUE SUCH AS POLYPS.: Brand: ROTH NET

## (undated) DEVICE — CAESAR GRASPING FORCEPS: Brand: CAESAR